# Patient Record
Sex: MALE | Race: WHITE | Employment: FULL TIME | URBAN - METROPOLITAN AREA
[De-identification: names, ages, dates, MRNs, and addresses within clinical notes are randomized per-mention and may not be internally consistent; named-entity substitution may affect disease eponyms.]

---

## 2017-09-22 ENCOUNTER — CASE MANAGEMENT (OUTPATIENT)
Dept: EMERGENCY DEPT | Age: 32
End: 2017-09-22

## 2019-01-14 ENCOUNTER — OFFICE VISIT (OUTPATIENT)
Dept: FAMILY MEDICINE CLINIC | Age: 34
End: 2019-01-14
Payer: COMMERCIAL

## 2019-01-14 VITALS
DIASTOLIC BLOOD PRESSURE: 80 MMHG | HEART RATE: 64 BPM | SYSTOLIC BLOOD PRESSURE: 132 MMHG | WEIGHT: 242 LBS | BODY MASS INDEX: 36.68 KG/M2 | HEIGHT: 68 IN | OXYGEN SATURATION: 99 %

## 2019-01-14 DIAGNOSIS — L60.8 DISCOLORATION OF NAIL: ICD-10-CM

## 2019-01-14 DIAGNOSIS — Z82.49 FAMILY HISTORY OF EARLY CAD: ICD-10-CM

## 2019-01-14 DIAGNOSIS — H65.91 FLUID LEVEL BEHIND TYMPANIC MEMBRANE OF RIGHT EAR: ICD-10-CM

## 2019-01-14 DIAGNOSIS — I10 ESSENTIAL HYPERTENSION: Primary | ICD-10-CM

## 2019-01-14 DIAGNOSIS — E66.09 CLASS 2 OBESITY DUE TO EXCESS CALORIES WITHOUT SERIOUS COMORBIDITY WITH BODY MASS INDEX (BMI) OF 36.0 TO 36.9 IN ADULT: ICD-10-CM

## 2019-01-14 DIAGNOSIS — K21.9 GASTROESOPHAGEAL REFLUX DISEASE, ESOPHAGITIS PRESENCE NOT SPECIFIED: ICD-10-CM

## 2019-01-14 PROBLEM — E66.812 CLASS 2 OBESITY DUE TO EXCESS CALORIES WITHOUT SERIOUS COMORBIDITY WITH BODY MASS INDEX (BMI) OF 36.0 TO 36.9 IN ADULT: Status: ACTIVE | Noted: 2019-01-14

## 2019-01-14 PROCEDURE — 99204 OFFICE O/P NEW MOD 45 MIN: CPT | Performed by: PHYSICIAN ASSISTANT

## 2019-01-14 RX ORDER — RANITIDINE 150 MG/1
150 TABLET ORAL DAILY
Qty: 90 TABLET | Refills: 1 | Status: SHIPPED | OUTPATIENT
Start: 2019-01-14 | End: 2019-06-24 | Stop reason: SDUPTHER

## 2019-01-14 RX ORDER — PREDNISONE 10 MG/1
TABLET ORAL
Qty: 17 TABLET | Refills: 0 | Status: SHIPPED | OUTPATIENT
Start: 2019-01-14 | End: 2019-01-24

## 2019-01-14 RX ORDER — FLUTICASONE PROPIONATE 50 MCG
2 SPRAY, SUSPENSION (ML) NASAL DAILY
Qty: 3 BOTTLE | Refills: 1 | Status: SHIPPED | OUTPATIENT
Start: 2019-01-14 | End: 2019-10-13 | Stop reason: ALTCHOICE

## 2019-01-14 RX ORDER — LISINOPRIL AND HYDROCHLOROTHIAZIDE 20; 12.5 MG/1; MG/1
1 TABLET ORAL DAILY
COMMUNITY
End: 2019-01-14 | Stop reason: SDUPTHER

## 2019-01-14 RX ORDER — LISINOPRIL AND HYDROCHLOROTHIAZIDE 20; 12.5 MG/1; MG/1
1 TABLET ORAL DAILY
Qty: 90 TABLET | Refills: 0 | Status: SHIPPED | OUTPATIENT
Start: 2019-01-14 | End: 2019-04-24 | Stop reason: SDUPTHER

## 2019-01-14 ASSESSMENT — ENCOUNTER SYMPTOMS
CONSTIPATION: 0
BLOOD IN STOOL: 0
DIARRHEA: 0
WHEEZING: 0
CHEST TIGHTNESS: 0
SHORTNESS OF BREATH: 0
ABDOMINAL PAIN: 0
BACK PAIN: 0
NAUSEA: 0
COLOR CHANGE: 1
VOMITING: 0

## 2019-01-14 ASSESSMENT — PATIENT HEALTH QUESTIONNAIRE - PHQ9
1. LITTLE INTEREST OR PLEASURE IN DOING THINGS: 0
2. FEELING DOWN, DEPRESSED OR HOPELESS: 0
SUM OF ALL RESPONSES TO PHQ9 QUESTIONS 1 & 2: 0
SUM OF ALL RESPONSES TO PHQ QUESTIONS 1-9: 0
DEPRESSION UNABLE TO ASSESS: FUNCTIONAL CAPACITY MOTIVATION LIMITS ACCURACY
SUM OF ALL RESPONSES TO PHQ QUESTIONS 1-9: 0

## 2019-01-30 DIAGNOSIS — E66.09 CLASS 2 OBESITY DUE TO EXCESS CALORIES WITHOUT SERIOUS COMORBIDITY WITH BODY MASS INDEX (BMI) OF 36.0 TO 36.9 IN ADULT: ICD-10-CM

## 2019-01-30 DIAGNOSIS — Z82.49 FAMILY HISTORY OF EARLY CAD: ICD-10-CM

## 2019-01-30 DIAGNOSIS — I10 ESSENTIAL HYPERTENSION: ICD-10-CM

## 2019-01-30 LAB
A/G RATIO: 1.5 (ref 1.1–2.2)
ALBUMIN SERPL-MCNC: 4.1 G/DL (ref 3.4–5)
ALP BLD-CCNC: 56 U/L (ref 40–129)
ALT SERPL-CCNC: 35 U/L (ref 10–40)
ANION GAP SERPL CALCULATED.3IONS-SCNC: 12 MMOL/L (ref 3–16)
AST SERPL-CCNC: 14 U/L (ref 15–37)
BASOPHILS ABSOLUTE: 0.1 K/UL (ref 0–0.2)
BASOPHILS RELATIVE PERCENT: 0.7 %
BILIRUB SERPL-MCNC: 0.5 MG/DL (ref 0–1)
BUN BLDV-MCNC: 15 MG/DL (ref 7–20)
CALCIUM SERPL-MCNC: 9.5 MG/DL (ref 8.3–10.6)
CHLORIDE BLD-SCNC: 100 MMOL/L (ref 99–110)
CHOLESTEROL, TOTAL: 181 MG/DL (ref 0–199)
CO2: 26 MMOL/L (ref 21–32)
CREAT SERPL-MCNC: 0.8 MG/DL (ref 0.9–1.3)
EOSINOPHILS ABSOLUTE: 0.1 K/UL (ref 0–0.6)
EOSINOPHILS RELATIVE PERCENT: 1.4 %
GFR AFRICAN AMERICAN: >60
GFR NON-AFRICAN AMERICAN: >60
GLOBULIN: 2.8 G/DL
GLUCOSE BLD-MCNC: 95 MG/DL (ref 70–99)
HCT VFR BLD CALC: 45.6 % (ref 40.5–52.5)
HDLC SERPL-MCNC: 36 MG/DL (ref 40–60)
HEMOGLOBIN: 15.3 G/DL (ref 13.5–17.5)
LDL CHOLESTEROL CALCULATED: 118 MG/DL
LYMPHOCYTES ABSOLUTE: 2.5 K/UL (ref 1–5.1)
LYMPHOCYTES RELATIVE PERCENT: 35.6 %
MCH RBC QN AUTO: 31.9 PG (ref 26–34)
MCHC RBC AUTO-ENTMCNC: 33.6 G/DL (ref 31–36)
MCV RBC AUTO: 95 FL (ref 80–100)
MONOCYTES ABSOLUTE: 0.7 K/UL (ref 0–1.3)
MONOCYTES RELATIVE PERCENT: 9.6 %
NEUTROPHILS ABSOLUTE: 3.7 K/UL (ref 1.7–7.7)
NEUTROPHILS RELATIVE PERCENT: 52.7 %
PDW BLD-RTO: 13.8 % (ref 12.4–15.4)
PLATELET # BLD: 245 K/UL (ref 135–450)
PMV BLD AUTO: 9.4 FL (ref 5–10.5)
POTASSIUM SERPL-SCNC: 4.8 MMOL/L (ref 3.5–5.1)
RBC # BLD: 4.8 M/UL (ref 4.2–5.9)
SODIUM BLD-SCNC: 138 MMOL/L (ref 136–145)
TOTAL PROTEIN: 6.9 G/DL (ref 6.4–8.2)
TRIGL SERPL-MCNC: 135 MG/DL (ref 0–150)
TSH REFLEX: 1.46 UIU/ML (ref 0.27–4.2)
VLDLC SERPL CALC-MCNC: 27 MG/DL
WBC # BLD: 7 K/UL (ref 4–11)

## 2019-04-24 DIAGNOSIS — I10 ESSENTIAL HYPERTENSION: ICD-10-CM

## 2019-04-24 RX ORDER — LISINOPRIL AND HYDROCHLOROTHIAZIDE 20; 12.5 MG/1; MG/1
1 TABLET ORAL DAILY
Qty: 90 TABLET | Refills: 0 | Status: SHIPPED | OUTPATIENT
Start: 2019-04-24 | End: 2019-07-26 | Stop reason: SDUPTHER

## 2019-04-24 NOTE — TELEPHONE ENCOUNTER
Last office visit 01/14/2019     Last written 01/14/2019, 90 days with  0 refills. Next office visit scheduled: None scheduled.      Requested Prescriptions     Pending Prescriptions Disp Refills    lisinopril-hydrochlorothiazide (PRINZIDE;ZESTORETIC) 20-12.5 MG per tablet 90 tablet 0     Sig: Take 1 tablet by mouth daily

## 2019-06-04 ENCOUNTER — OFFICE VISIT (OUTPATIENT)
Dept: FAMILY MEDICINE CLINIC | Age: 34
End: 2019-06-04
Payer: COMMERCIAL

## 2019-06-04 VITALS
OXYGEN SATURATION: 99 % | BODY MASS INDEX: 36.15 KG/M2 | SYSTOLIC BLOOD PRESSURE: 132 MMHG | HEIGHT: 68 IN | HEART RATE: 76 BPM | DIASTOLIC BLOOD PRESSURE: 70 MMHG | WEIGHT: 238.5 LBS

## 2019-06-04 DIAGNOSIS — M25.551 RIGHT HIP PAIN: Primary | ICD-10-CM

## 2019-06-04 PROCEDURE — 99213 OFFICE O/P EST LOW 20 MIN: CPT | Performed by: PHYSICIAN ASSISTANT

## 2019-06-04 RX ORDER — IBUPROFEN 800 MG/1
800 TABLET ORAL 3 TIMES DAILY PRN
Qty: 90 TABLET | Refills: 1 | Status: SHIPPED | OUTPATIENT
Start: 2019-06-04 | End: 2019-08-06 | Stop reason: SDUPTHER

## 2019-06-04 ASSESSMENT — ENCOUNTER SYMPTOMS
SHORTNESS OF BREATH: 0
COLOR CHANGE: 0

## 2019-06-04 NOTE — PROGRESS NOTES
Merlin Alexander 29 y.o. male    Chief Complaint   Patient presents with    Hip Pain     right       HPI  Right Hip pain: Chronic pain getting worse in the last month. Location: anterior right groin. Pain wakes him up at night. Occasionally feels like his hip is catching. Denies any weakness or numbness in the leg. Denies any swelling in the leg. 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 Taking 400 mg BID. Current Outpatient Medications:     ibuprofen (ADVIL;MOTRIN) 800 MG tablet, Take 1 tablet by mouth 3 times daily as needed for Pain, Disp: 90 tablet, Rfl: 1    lisinopril-hydrochlorothiazide (PRINZIDE;ZESTORETIC) 20-12.5 MG per tablet, Take 1 tablet by mouth daily, Disp: 90 tablet, Rfl: 0    ranitidine (ZANTAC) 150 MG tablet, Take 1 tablet by mouth daily, Disp: 90 tablet, Rfl: 1    fluticasone (FLONASE) 50 MCG/ACT nasal spray, 2 sprays by Each Nare route daily, Disp: 3 Bottle, Rfl: 1    ciclopirox (PENLAC) 8 % solution, Apply topically to fingernail nightly., Disp: 1 Bottle, Rfl: 0      Vitals:    06/04/19 1807   BP: 132/70   Pulse: 76   SpO2: 99%       Review of Systems   Constitutional: Negative for fever. Respiratory: Negative for shortness of breath. Cardiovascular: Negative for chest pain and leg swelling. Musculoskeletal: Positive for arthralgias and myalgias. Negative for gait problem, joint swelling, neck pain and neck stiffness. Skin: Negative for color change. Neurological: Negative for weakness and numbness. Hematological: Negative for adenopathy. Does not bruise/bleed easily. Physical Exam   Constitutional: He appears well-developed and well-nourished. Cardiovascular: Normal rate, regular rhythm and normal heart sounds. Pulmonary/Chest: Effort normal and breath sounds normal.   Musculoskeletal:        Right hip: He exhibits decreased range of motion and crepitus. He exhibits normal strength, no tenderness, no bony tenderness and no swelling.         Right knee: He

## 2019-06-24 DIAGNOSIS — K21.9 GASTROESOPHAGEAL REFLUX DISEASE, ESOPHAGITIS PRESENCE NOT SPECIFIED: ICD-10-CM

## 2019-06-24 RX ORDER — RANITIDINE 150 MG/1
150 TABLET ORAL DAILY
Qty: 90 TABLET | Refills: 1 | Status: SHIPPED | OUTPATIENT
Start: 2019-06-24 | End: 2019-10-30 | Stop reason: SDUPTHER

## 2019-06-24 NOTE — TELEPHONE ENCOUNTER
.  Last office visit 6/4/2019     Last written 1-14-19 90 with 1      Next office visit scheduled no future ov    Requested Prescriptions     Pending Prescriptions Disp Refills    ranitidine (ZANTAC) 150 MG tablet 90 tablet 1     Sig: Take 1 tablet by mouth daily

## 2019-07-05 ENCOUNTER — TELEPHONE (OUTPATIENT)
Dept: FAMILY MEDICINE CLINIC | Age: 34
End: 2019-07-05

## 2019-07-05 NOTE — TELEPHONE ENCOUNTER
Suggest to pt that he be seen in an Urgent care over the weekend. If this is another cellulitis infection we need to catch it early.  Thank you

## 2019-07-26 DIAGNOSIS — I10 ESSENTIAL HYPERTENSION: ICD-10-CM

## 2019-07-26 RX ORDER — LISINOPRIL AND HYDROCHLOROTHIAZIDE 20; 12.5 MG/1; MG/1
1 TABLET ORAL DAILY
Qty: 90 TABLET | Refills: 0 | Status: ON HOLD | OUTPATIENT
Start: 2019-07-26 | End: 2019-10-15 | Stop reason: HOSPADM

## 2019-08-06 DIAGNOSIS — M25.551 RIGHT HIP PAIN: ICD-10-CM

## 2019-08-06 RX ORDER — IBUPROFEN 800 MG/1
800 TABLET ORAL 2 TIMES DAILY PRN
Qty: 90 TABLET | Refills: 0 | Status: SHIPPED | OUTPATIENT
Start: 2019-08-06 | End: 2019-10-13 | Stop reason: ALTCHOICE

## 2019-08-06 NOTE — TELEPHONE ENCOUNTER
Last Seen: 6/4/2019    Last Writen: 6-4-2019 x 1 refill    Next Appointment: Not scheduled    Requested Prescriptions     Pending Prescriptions Disp Refills    ibuprofen (ADVIL;MOTRIN) 800 MG tablet 90 tablet 1     Sig: Take 1 tablet by mouth 3 times daily as needed for Pain

## 2019-10-13 ENCOUNTER — HOSPITAL ENCOUNTER (INPATIENT)
Age: 34
LOS: 2 days | Discharge: HOME | DRG: 603 | End: 2019-10-15
Attending: EMERGENCY MEDICINE | Admitting: INTERNAL MEDICINE
Payer: COMMERCIAL

## 2019-10-13 ENCOUNTER — APPOINTMENT (OUTPATIENT)
Dept: CT IMAGING | Age: 34
DRG: 603 | End: 2019-10-13
Payer: COMMERCIAL

## 2019-10-13 ENCOUNTER — APPOINTMENT (OUTPATIENT)
Dept: GENERAL RADIOLOGY | Age: 34
DRG: 603 | End: 2019-10-13
Payer: COMMERCIAL

## 2019-10-13 DIAGNOSIS — L03.116 BILATERAL LOWER LEG CELLULITIS: Primary | ICD-10-CM

## 2019-10-13 DIAGNOSIS — L03.115 BILATERAL LOWER LEG CELLULITIS: Primary | ICD-10-CM

## 2019-10-13 LAB
A/G RATIO: 1.1 (ref 1.1–2.2)
ALBUMIN SERPL-MCNC: 3.9 G/DL (ref 3.4–5)
ALP BLD-CCNC: 48 U/L (ref 40–129)
ALT SERPL-CCNC: 18 U/L (ref 10–40)
ANION GAP SERPL CALCULATED.3IONS-SCNC: 12 MMOL/L (ref 3–16)
AST SERPL-CCNC: 11 U/L (ref 15–37)
BASOPHILS ABSOLUTE: 0 K/UL (ref 0–0.2)
BASOPHILS RELATIVE PERCENT: 0.3 %
BILIRUB SERPL-MCNC: 0.6 MG/DL (ref 0–1)
BUN BLDV-MCNC: 11 MG/DL (ref 7–20)
CALCIUM SERPL-MCNC: 9.4 MG/DL (ref 8.3–10.6)
CHLORIDE BLD-SCNC: 96 MMOL/L (ref 99–110)
CO2: 25 MMOL/L (ref 21–32)
CREAT SERPL-MCNC: 0.9 MG/DL (ref 0.9–1.3)
EOSINOPHILS ABSOLUTE: 0 K/UL (ref 0–0.6)
EOSINOPHILS RELATIVE PERCENT: 0.1 %
GFR AFRICAN AMERICAN: >60
GFR NON-AFRICAN AMERICAN: >60
GLOBULIN: 3.4 G/DL
GLUCOSE BLD-MCNC: 107 MG/DL (ref 70–99)
HCT VFR BLD CALC: 41.5 % (ref 40.5–52.5)
HEMOGLOBIN: 14.2 G/DL (ref 13.5–17.5)
LACTIC ACID: 1.4 MMOL/L (ref 0.4–2)
LYMPHOCYTES ABSOLUTE: 0.8 K/UL (ref 1–5.1)
LYMPHOCYTES RELATIVE PERCENT: 5.6 %
MCH RBC QN AUTO: 31.8 PG (ref 26–34)
MCHC RBC AUTO-ENTMCNC: 34.3 G/DL (ref 31–36)
MCV RBC AUTO: 92.9 FL (ref 80–100)
MONOCYTES ABSOLUTE: 1.3 K/UL (ref 0–1.3)
MONOCYTES RELATIVE PERCENT: 9 %
NEUTROPHILS ABSOLUTE: 11.9 K/UL (ref 1.7–7.7)
NEUTROPHILS RELATIVE PERCENT: 85 %
PDW BLD-RTO: 12.8 % (ref 12.4–15.4)
PLATELET # BLD: 194 K/UL (ref 135–450)
PMV BLD AUTO: 8.6 FL (ref 5–10.5)
POTASSIUM REFLEX MAGNESIUM: 3.9 MMOL/L (ref 3.5–5.1)
RBC # BLD: 4.47 M/UL (ref 4.2–5.9)
SEDIMENTATION RATE, ERYTHROCYTE: 49 MM/HR (ref 0–15)
SODIUM BLD-SCNC: 133 MMOL/L (ref 136–145)
TOTAL PROTEIN: 7.3 G/DL (ref 6.4–8.2)
WBC # BLD: 14 K/UL (ref 4–11)

## 2019-10-13 PROCEDURE — 85652 RBC SED RATE AUTOMATED: CPT

## 2019-10-13 PROCEDURE — 75635 CT ANGIO ABDOMINAL ARTERIES: CPT

## 2019-10-13 PROCEDURE — 85025 COMPLETE CBC W/AUTO DIFF WBC: CPT

## 2019-10-13 PROCEDURE — 87040 BLOOD CULTURE FOR BACTERIA: CPT

## 2019-10-13 PROCEDURE — 2580000003 HC RX 258: Performed by: INTERNAL MEDICINE

## 2019-10-13 PROCEDURE — 2500000003 HC RX 250 WO HCPCS

## 2019-10-13 PROCEDURE — 2580000003 HC RX 258

## 2019-10-13 PROCEDURE — 86140 C-REACTIVE PROTEIN: CPT

## 2019-10-13 PROCEDURE — 6360000002 HC RX W HCPCS: Performed by: PHYSICIAN ASSISTANT

## 2019-10-13 PROCEDURE — 96361 HYDRATE IV INFUSION ADD-ON: CPT

## 2019-10-13 PROCEDURE — 6360000002 HC RX W HCPCS

## 2019-10-13 PROCEDURE — 6370000000 HC RX 637 (ALT 250 FOR IP): Performed by: INTERNAL MEDICINE

## 2019-10-13 PROCEDURE — 1200000000 HC SEMI PRIVATE

## 2019-10-13 PROCEDURE — 36415 COLL VENOUS BLD VENIPUNCTURE: CPT

## 2019-10-13 PROCEDURE — 2580000003 HC RX 258: Performed by: PHYSICIAN ASSISTANT

## 2019-10-13 PROCEDURE — 6360000004 HC RX CONTRAST MEDICATION: Performed by: PHYSICIAN ASSISTANT

## 2019-10-13 PROCEDURE — 99284 EMERGENCY DEPT VISIT MOD MDM: CPT

## 2019-10-13 PROCEDURE — 6370000000 HC RX 637 (ALT 250 FOR IP): Performed by: PHYSICIAN ASSISTANT

## 2019-10-13 PROCEDURE — 96360 HYDRATION IV INFUSION INIT: CPT

## 2019-10-13 PROCEDURE — 83605 ASSAY OF LACTIC ACID: CPT

## 2019-10-13 PROCEDURE — 80053 COMPREHEN METABOLIC PANEL: CPT

## 2019-10-13 PROCEDURE — 80074 ACUTE HEPATITIS PANEL: CPT

## 2019-10-13 RX ORDER — SODIUM CHLORIDE 0.9 % (FLUSH) 0.9 %
10 SYRINGE (ML) INJECTION PRN
Status: DISCONTINUED | OUTPATIENT
Start: 2019-10-13 | End: 2019-10-15 | Stop reason: HOSPADM

## 2019-10-13 RX ORDER — IBUPROFEN 800 MG/1
800 TABLET ORAL ONCE
Status: COMPLETED | OUTPATIENT
Start: 2019-10-13 | End: 2019-10-13

## 2019-10-13 RX ORDER — SODIUM CHLORIDE 9 MG/ML
INJECTION, SOLUTION INTRAVENOUS
Status: COMPLETED
Start: 2019-10-13 | End: 2019-10-14

## 2019-10-13 RX ORDER — MAGNESIUM SULFATE 1 G/100ML
1 INJECTION INTRAVENOUS PRN
Status: DISCONTINUED | OUTPATIENT
Start: 2019-10-13 | End: 2019-10-15 | Stop reason: HOSPADM

## 2019-10-13 RX ORDER — HYDROCODONE BITARTRATE AND ACETAMINOPHEN 5; 325 MG/1; MG/1
1 TABLET ORAL ONCE
Status: COMPLETED | OUTPATIENT
Start: 2019-10-13 | End: 2019-10-13

## 2019-10-13 RX ORDER — IBUPROFEN 400 MG/1
400 TABLET ORAL ONCE
Status: COMPLETED | OUTPATIENT
Start: 2019-10-13 | End: 2019-10-13

## 2019-10-13 RX ORDER — CLINDAMYCIN HYDROCHLORIDE 150 MG/1
300 CAPSULE ORAL ONCE
Status: COMPLETED | OUTPATIENT
Start: 2019-10-13 | End: 2019-10-13

## 2019-10-13 RX ORDER — DIPHENHYDRAMINE HYDROCHLORIDE 50 MG/ML
50 INJECTION INTRAMUSCULAR; INTRAVENOUS ONCE
Status: COMPLETED | OUTPATIENT
Start: 2019-10-13 | End: 2019-10-13

## 2019-10-13 RX ORDER — ACETAMINOPHEN 500 MG
1000 TABLET ORAL ONCE
Status: COMPLETED | OUTPATIENT
Start: 2019-10-13 | End: 2019-10-13

## 2019-10-13 RX ORDER — 0.9 % SODIUM CHLORIDE 0.9 %
1000 INTRAVENOUS SOLUTION INTRAVENOUS ONCE
Status: COMPLETED | OUTPATIENT
Start: 2019-10-13 | End: 2019-10-13

## 2019-10-13 RX ORDER — DIPHENHYDRAMINE HYDROCHLORIDE 50 MG/ML
INJECTION INTRAMUSCULAR; INTRAVENOUS
Status: COMPLETED
Start: 2019-10-13 | End: 2019-10-13

## 2019-10-13 RX ORDER — SODIUM CHLORIDE 0.9 % (FLUSH) 0.9 %
10 SYRINGE (ML) INJECTION EVERY 12 HOURS SCHEDULED
Status: DISCONTINUED | OUTPATIENT
Start: 2019-10-13 | End: 2019-10-15 | Stop reason: HOSPADM

## 2019-10-13 RX ORDER — POTASSIUM CHLORIDE 7.45 MG/ML
10 INJECTION INTRAVENOUS PRN
Status: DISCONTINUED | OUTPATIENT
Start: 2019-10-13 | End: 2019-10-15 | Stop reason: HOSPADM

## 2019-10-13 RX ORDER — ONDANSETRON 2 MG/ML
4 INJECTION INTRAMUSCULAR; INTRAVENOUS EVERY 6 HOURS PRN
Status: DISCONTINUED | OUTPATIENT
Start: 2019-10-13 | End: 2019-10-15 | Stop reason: HOSPADM

## 2019-10-13 RX ADMIN — IOPAMIDOL 100 ML: 755 INJECTION, SOLUTION INTRAVENOUS at 17:20

## 2019-10-13 RX ADMIN — HYDROCODONE BITARTRATE AND ACETAMINOPHEN 1 TABLET: 5; 325 TABLET ORAL at 17:43

## 2019-10-13 RX ADMIN — VANCOMYCIN HYDROCHLORIDE 1.5 G: 10 INJECTION, POWDER, LYOPHILIZED, FOR SOLUTION INTRAVENOUS at 21:57

## 2019-10-13 RX ADMIN — CEFEPIME 2 G: 2 INJECTION, POWDER, FOR SOLUTION INTRAVENOUS at 19:25

## 2019-10-13 RX ADMIN — DIPHENHYDRAMINE HYDROCHLORIDE 50 MG: 50 INJECTION, SOLUTION INTRAMUSCULAR; INTRAVENOUS at 19:46

## 2019-10-13 RX ADMIN — Medication 10 ML: at 21:59

## 2019-10-13 RX ADMIN — IBUPROFEN 400 MG: 400 TABLET, FILM COATED ORAL at 21:57

## 2019-10-13 RX ADMIN — ACETAMINOPHEN 1000 MG: 500 TABLET ORAL at 21:56

## 2019-10-13 RX ADMIN — SODIUM CHLORIDE 1000 ML: 9 INJECTION, SOLUTION INTRAVENOUS at 15:56

## 2019-10-13 RX ADMIN — IBUPROFEN 800 MG: 800 TABLET, FILM COATED ORAL at 15:56

## 2019-10-13 RX ADMIN — CLINDAMYCIN HYDROCHLORIDE 300 MG: 150 CAPSULE ORAL at 15:56

## 2019-10-13 RX ADMIN — DIPHENHYDRAMINE HYDROCHLORIDE 50 MG: 50 INJECTION INTRAMUSCULAR; INTRAVENOUS at 19:46

## 2019-10-13 RX ADMIN — SODIUM CHLORIDE 250 ML: 9 INJECTION, SOLUTION INTRAVENOUS at 21:57

## 2019-10-13 RX ADMIN — FAMOTIDINE 40 MG: 10 INJECTION, SOLUTION INTRAVENOUS at 19:46

## 2019-10-13 ASSESSMENT — PAIN DESCRIPTION - PAIN TYPE: TYPE: ACUTE PAIN

## 2019-10-13 ASSESSMENT — PAIN DESCRIPTION - FREQUENCY: FREQUENCY: INTERMITTENT

## 2019-10-13 ASSESSMENT — PAIN SCALES - GENERAL
PAINLEVEL_OUTOF10: 7
PAINLEVEL_OUTOF10: 10
PAINLEVEL_OUTOF10: 8

## 2019-10-13 ASSESSMENT — PAIN DESCRIPTION - DESCRIPTORS: DESCRIPTORS: BURNING

## 2019-10-13 ASSESSMENT — PAIN DESCRIPTION - PROGRESSION: CLINICAL_PROGRESSION: NOT CHANGED

## 2019-10-13 ASSESSMENT — PAIN DESCRIPTION - ORIENTATION
ORIENTATION: RIGHT;LEFT
ORIENTATION: RIGHT;LEFT

## 2019-10-13 ASSESSMENT — PAIN DESCRIPTION - LOCATION
LOCATION: LEG
LOCATION: GROIN

## 2019-10-13 ASSESSMENT — PAIN DESCRIPTION - ONSET: ONSET: ON-GOING

## 2019-10-13 ASSESSMENT — PAIN - FUNCTIONAL ASSESSMENT: PAIN_FUNCTIONAL_ASSESSMENT: ACTIVITIES ARE NOT PREVENTED

## 2019-10-14 LAB
C-REACTIVE PROTEIN: 81.9 MG/L (ref 0–5.1)
HAV IGM SER IA-ACNC: NORMAL
HEPATITIS B CORE IGM ANTIBODY: NORMAL
HEPATITIS B SURFACE ANTIGEN INTERPRETATION: NORMAL
HEPATITIS C ANTIBODY INTERPRETATION: NORMAL

## 2019-10-14 PROCEDURE — 99232 SBSQ HOSP IP/OBS MODERATE 35: CPT | Performed by: INTERNAL MEDICINE

## 2019-10-14 PROCEDURE — 6360000002 HC RX W HCPCS: Performed by: PHYSICIAN ASSISTANT

## 2019-10-14 PROCEDURE — 1200000000 HC SEMI PRIVATE

## 2019-10-14 PROCEDURE — 2580000003 HC RX 258: Performed by: INTERNAL MEDICINE

## 2019-10-14 PROCEDURE — 6360000002 HC RX W HCPCS: Performed by: INTERNAL MEDICINE

## 2019-10-14 PROCEDURE — 6370000000 HC RX 637 (ALT 250 FOR IP): Performed by: PHYSICIAN ASSISTANT

## 2019-10-14 PROCEDURE — 2580000003 HC RX 258: Performed by: PHYSICIAN ASSISTANT

## 2019-10-14 PROCEDURE — 6370000000 HC RX 637 (ALT 250 FOR IP): Performed by: INTERNAL MEDICINE

## 2019-10-14 RX ORDER — DIPHENHYDRAMINE HCL 25 MG
25 TABLET ORAL NIGHTLY PRN
Status: DISCONTINUED | OUTPATIENT
Start: 2019-10-14 | End: 2019-10-15 | Stop reason: HOSPADM

## 2019-10-14 RX ORDER — FAMOTIDINE 20 MG/1
20 TABLET, FILM COATED ORAL 2 TIMES DAILY
Status: DISCONTINUED | OUTPATIENT
Start: 2019-10-14 | End: 2019-10-15 | Stop reason: HOSPADM

## 2019-10-14 RX ORDER — HYDROCODONE BITARTRATE AND ACETAMINOPHEN 5; 325 MG/1; MG/1
1 TABLET ORAL EVERY 6 HOURS PRN
Status: DISCONTINUED | OUTPATIENT
Start: 2019-10-14 | End: 2019-10-15 | Stop reason: HOSPADM

## 2019-10-14 RX ADMIN — VANCOMYCIN HYDROCHLORIDE 1250 MG: 10 INJECTION, POWDER, LYOPHILIZED, FOR SOLUTION INTRAVENOUS at 10:12

## 2019-10-14 RX ADMIN — AMPICILLIN AND SULBACTAM 1.5 G: 1; .5 INJECTION, POWDER, FOR SOLUTION INTRAMUSCULAR; INTRAVENOUS at 11:52

## 2019-10-14 RX ADMIN — AMPICILLIN AND SULBACTAM 1.5 G: 1; .5 INJECTION, POWDER, FOR SOLUTION INTRAMUSCULAR; INTRAVENOUS at 21:39

## 2019-10-14 RX ADMIN — HYDROCODONE BITARTRATE AND ACETAMINOPHEN 1 TABLET: 5; 325 TABLET ORAL at 11:28

## 2019-10-14 RX ADMIN — DIPHENHYDRAMINE HCL 25 MG: 25 TABLET ORAL at 21:01

## 2019-10-14 RX ADMIN — VANCOMYCIN HYDROCHLORIDE 1250 MG: 10 INJECTION, POWDER, LYOPHILIZED, FOR SOLUTION INTRAVENOUS at 22:43

## 2019-10-14 RX ADMIN — FAMOTIDINE 20 MG: 20 TABLET ORAL at 19:01

## 2019-10-14 RX ADMIN — AMPICILLIN AND SULBACTAM 1.5 G: 1; .5 INJECTION, POWDER, FOR SOLUTION INTRAMUSCULAR; INTRAVENOUS at 17:06

## 2019-10-14 RX ADMIN — ENOXAPARIN SODIUM 40 MG: 40 INJECTION SUBCUTANEOUS at 08:03

## 2019-10-14 RX ADMIN — HYDROCODONE BITARTRATE AND ACETAMINOPHEN 1 TABLET: 5; 325 TABLET ORAL at 17:06

## 2019-10-14 RX ADMIN — Medication 10 ML: at 08:04

## 2019-10-14 RX ADMIN — Medication 10 ML: at 21:02

## 2019-10-14 ASSESSMENT — PAIN SCALES - GENERAL
PAINLEVEL_OUTOF10: 6
PAINLEVEL_OUTOF10: 8
PAINLEVEL_OUTOF10: 5
PAINLEVEL_OUTOF10: 4

## 2019-10-15 VITALS
OXYGEN SATURATION: 96 % | BODY MASS INDEX: 37.06 KG/M2 | WEIGHT: 236.1 LBS | HEIGHT: 67 IN | HEART RATE: 81 BPM | TEMPERATURE: 97.8 F | SYSTOLIC BLOOD PRESSURE: 127 MMHG | DIASTOLIC BLOOD PRESSURE: 78 MMHG | RESPIRATION RATE: 16 BRPM

## 2019-10-15 LAB — VANCOMYCIN TROUGH: 6.9 UG/ML (ref 10–20)

## 2019-10-15 PROCEDURE — 6370000000 HC RX 637 (ALT 250 FOR IP): Performed by: INTERNAL MEDICINE

## 2019-10-15 PROCEDURE — 80202 ASSAY OF VANCOMYCIN: CPT

## 2019-10-15 PROCEDURE — 36415 COLL VENOUS BLD VENIPUNCTURE: CPT

## 2019-10-15 PROCEDURE — 99238 HOSP IP/OBS DSCHRG MGMT 30/<: CPT | Performed by: INTERNAL MEDICINE

## 2019-10-15 PROCEDURE — 6360000002 HC RX W HCPCS: Performed by: INTERNAL MEDICINE

## 2019-10-15 PROCEDURE — 2580000003 HC RX 258: Performed by: INTERNAL MEDICINE

## 2019-10-15 PROCEDURE — 6370000000 HC RX 637 (ALT 250 FOR IP): Performed by: PHYSICIAN ASSISTANT

## 2019-10-15 PROCEDURE — 6360000002 HC RX W HCPCS: Performed by: PHYSICIAN ASSISTANT

## 2019-10-15 PROCEDURE — 2580000003 HC RX 258: Performed by: PHYSICIAN ASSISTANT

## 2019-10-15 RX ORDER — IBUPROFEN 800 MG/1
800 TABLET ORAL EVERY 8 HOURS PRN
Qty: 30 TABLET | Refills: 0 | Status: SHIPPED | OUTPATIENT
Start: 2019-10-15 | End: 2020-03-19

## 2019-10-15 RX ORDER — AMOXICILLIN AND CLAVULANATE POTASSIUM 500; 125 MG/1; MG/1
1 TABLET, FILM COATED ORAL 3 TIMES DAILY
Qty: 21 TABLET | Refills: 0 | Status: SHIPPED | OUTPATIENT
Start: 2019-10-15 | End: 2019-10-22

## 2019-10-15 RX ORDER — HYDROCHLOROTHIAZIDE 12.5 MG/1
12.5 TABLET ORAL DAILY
Qty: 30 TABLET | Refills: 0 | Status: SHIPPED | OUTPATIENT
Start: 2019-10-15 | End: 2019-10-18 | Stop reason: SDUPTHER

## 2019-10-15 RX ADMIN — ENOXAPARIN SODIUM 40 MG: 40 INJECTION SUBCUTANEOUS at 08:24

## 2019-10-15 RX ADMIN — AMPICILLIN AND SULBACTAM 1.5 G: 1; .5 INJECTION, POWDER, FOR SOLUTION INTRAMUSCULAR; INTRAVENOUS at 04:09

## 2019-10-15 RX ADMIN — HYDROCODONE BITARTRATE AND ACETAMINOPHEN 1 TABLET: 5; 325 TABLET ORAL at 08:24

## 2019-10-15 RX ADMIN — Medication 10 ML: at 08:25

## 2019-10-15 RX ADMIN — AMPICILLIN SODIUM AND SULBACTAM SODIUM 3 G: 2; 1 INJECTION, POWDER, FOR SOLUTION INTRAMUSCULAR; INTRAVENOUS at 10:28

## 2019-10-15 RX ADMIN — FAMOTIDINE 20 MG: 20 TABLET ORAL at 08:24

## 2019-10-15 RX ADMIN — HYDROCODONE BITARTRATE AND ACETAMINOPHEN 1 TABLET: 5; 325 TABLET ORAL at 00:19

## 2019-10-15 ASSESSMENT — PAIN DESCRIPTION - ONSET
ONSET: GRADUAL
ONSET: GRADUAL

## 2019-10-15 ASSESSMENT — PAIN SCALES - GENERAL
PAINLEVEL_OUTOF10: 7
PAINLEVEL_OUTOF10: 6
PAINLEVEL_OUTOF10: 0

## 2019-10-15 ASSESSMENT — PAIN DESCRIPTION - ORIENTATION
ORIENTATION: LEFT
ORIENTATION: LEFT

## 2019-10-15 ASSESSMENT — PAIN DESCRIPTION - LOCATION
LOCATION: LEG
LOCATION: LEG

## 2019-10-15 ASSESSMENT — PAIN - FUNCTIONAL ASSESSMENT
PAIN_FUNCTIONAL_ASSESSMENT: ACTIVITIES ARE NOT PREVENTED
PAIN_FUNCTIONAL_ASSESSMENT: ACTIVITIES ARE NOT PREVENTED

## 2019-10-15 ASSESSMENT — PAIN DESCRIPTION - FREQUENCY
FREQUENCY: INTERMITTENT
FREQUENCY: INTERMITTENT

## 2019-10-15 ASSESSMENT — PAIN DESCRIPTION - PROGRESSION: CLINICAL_PROGRESSION: GRADUALLY WORSENING

## 2019-10-15 ASSESSMENT — PAIN DESCRIPTION - DESCRIPTORS
DESCRIPTORS: SORE;PRESSURE
DESCRIPTORS: PRESSURE;SORE

## 2019-10-15 ASSESSMENT — PAIN DESCRIPTION - PAIN TYPE
TYPE: ACUTE PAIN
TYPE: ACUTE PAIN

## 2019-10-16 ENCOUNTER — TELEPHONE (OUTPATIENT)
Dept: FAMILY MEDICINE CLINIC | Age: 34
End: 2019-10-16

## 2019-10-17 ENCOUNTER — TELEPHONE (OUTPATIENT)
Dept: FAMILY MEDICINE CLINIC | Age: 34
End: 2019-10-17

## 2019-10-18 ENCOUNTER — OFFICE VISIT (OUTPATIENT)
Dept: FAMILY MEDICINE CLINIC | Age: 34
End: 2019-10-18
Payer: COMMERCIAL

## 2019-10-18 VITALS
OXYGEN SATURATION: 99 % | BODY MASS INDEX: 37.75 KG/M2 | WEIGHT: 241 LBS | TEMPERATURE: 98.3 F | SYSTOLIC BLOOD PRESSURE: 110 MMHG | DIASTOLIC BLOOD PRESSURE: 80 MMHG | HEART RATE: 59 BPM

## 2019-10-18 DIAGNOSIS — R60.9 DEPENDENT EDEMA: ICD-10-CM

## 2019-10-18 DIAGNOSIS — R70.0 ELEVATED ERYTHROCYTE SEDIMENTATION RATE: ICD-10-CM

## 2019-10-18 DIAGNOSIS — L03.115 BILATERAL LOWER LEG CELLULITIS: Primary | ICD-10-CM

## 2019-10-18 DIAGNOSIS — D72.829 LEUKOCYTOSIS, UNSPECIFIED TYPE: ICD-10-CM

## 2019-10-18 DIAGNOSIS — R79.82 ELEVATED C-REACTIVE PROTEIN (CRP): ICD-10-CM

## 2019-10-18 DIAGNOSIS — L03.116 BILATERAL LOWER LEG CELLULITIS: Primary | ICD-10-CM

## 2019-10-18 LAB
BLOOD CULTURE, ROUTINE: NORMAL
CULTURE, BLOOD 2: NORMAL

## 2019-10-18 PROCEDURE — 1111F DSCHRG MED/CURRENT MED MERGE: CPT | Performed by: PHYSICIAN ASSISTANT

## 2019-10-18 PROCEDURE — 99495 TRANSJ CARE MGMT MOD F2F 14D: CPT | Performed by: PHYSICIAN ASSISTANT

## 2019-10-18 RX ORDER — HYDROCHLOROTHIAZIDE 25 MG/1
25 TABLET ORAL DAILY
Qty: 90 TABLET | Refills: 1 | Status: SHIPPED | OUTPATIENT
Start: 2019-10-18 | End: 2020-03-30 | Stop reason: SDUPTHER

## 2019-10-22 ENCOUNTER — TELEPHONE (OUTPATIENT)
Dept: FAMILY MEDICINE CLINIC | Age: 34
End: 2019-10-22

## 2019-10-30 DIAGNOSIS — K21.9 GASTROESOPHAGEAL REFLUX DISEASE, ESOPHAGITIS PRESENCE NOT SPECIFIED: ICD-10-CM

## 2019-10-30 RX ORDER — RANITIDINE 150 MG/1
150 TABLET ORAL DAILY
Qty: 90 TABLET | Refills: 1 | Status: SHIPPED | OUTPATIENT
Start: 2019-10-30 | End: 2020-03-19 | Stop reason: CLARIF

## 2019-12-17 ENCOUNTER — TELEPHONE (OUTPATIENT)
Dept: FAMILY MEDICINE CLINIC | Age: 34
End: 2019-12-17

## 2019-12-17 RX ORDER — SULFAMETHOXAZOLE AND TRIMETHOPRIM 800; 160 MG/1; MG/1
1 TABLET ORAL 2 TIMES DAILY
Qty: 14 TABLET | Refills: 0 | Status: SHIPPED | OUTPATIENT
Start: 2019-12-17 | End: 2019-12-24

## 2020-03-04 ENCOUNTER — OFFICE VISIT (OUTPATIENT)
Dept: FAMILY MEDICINE CLINIC | Age: 35
End: 2020-03-04
Payer: COMMERCIAL

## 2020-03-04 VITALS
TEMPERATURE: 99 F | WEIGHT: 219 LBS | BODY MASS INDEX: 34.3 KG/M2 | OXYGEN SATURATION: 98 % | SYSTOLIC BLOOD PRESSURE: 120 MMHG | HEART RATE: 87 BPM | DIASTOLIC BLOOD PRESSURE: 80 MMHG

## 2020-03-04 DIAGNOSIS — L81.9 DISCOLORATION OF SKIN OF LOWER LEG: ICD-10-CM

## 2020-03-04 DIAGNOSIS — M79.662 PAIN OF LEFT LOWER LEG: ICD-10-CM

## 2020-03-04 PROCEDURE — 99214 OFFICE O/P EST MOD 30 MIN: CPT | Performed by: PHYSICIAN ASSISTANT

## 2020-03-04 RX ORDER — TIZANIDINE 4 MG/1
4 TABLET ORAL EVERY 8 HOURS PRN
Qty: 30 TABLET | Refills: 0 | Status: SHIPPED | OUTPATIENT
Start: 2020-03-04 | End: 2020-06-08

## 2020-03-04 ASSESSMENT — PATIENT HEALTH QUESTIONNAIRE - PHQ9
SUM OF ALL RESPONSES TO PHQ QUESTIONS 1-9: 0
2. FEELING DOWN, DEPRESSED OR HOPELESS: 0
1. LITTLE INTEREST OR PLEASURE IN DOING THINGS: 0
SUM OF ALL RESPONSES TO PHQ9 QUESTIONS 1 & 2: 0
SUM OF ALL RESPONSES TO PHQ QUESTIONS 1-9: 0

## 2020-03-04 NOTE — LETTER
[address] [facility] [address]  [facility] [address]  Phone: [phone]  Fax: [phone]    Bruce Sparks        March 4, 2020     Patient: Kole Dyer   YOB: 1985   Date of Visit: 3/4/2020       To Whom It May Concern: It is my medical opinion that Kole Dyer may return to work on 03/06/2020. If you have any questions or concerns, please don't hesitate to call.     Sincerely,          LYDIA Sparks

## 2020-03-04 NOTE — PROGRESS NOTES
SpO2: 98%       Review of Systems   Constitutional: Negative for chills, diaphoresis, fatigue and fever. Gastrointestinal: Negative for abdominal pain and bowel incontinence. Genitourinary: Negative for bladder incontinence, dysuria and pelvic pain. Musculoskeletal: Positive for back pain. Negative for arthralgias, gait problem, joint swelling, myalgias, neck pain and neck stiffness. Skin: Positive for color change. Negative for pallor, rash and wound. Neurological: Positive for paresthesias. Negative for weakness and numbness. Hematological: Negative for adenopathy. Physical Exam  Vitals signs reviewed. Constitutional:       Appearance: Normal appearance. Cardiovascular:      Rate and Rhythm: Normal rate and regular rhythm. Pulses:           Dorsalis pedis pulses are 2+ on the right side and 2+ on the left side. Posterior tibial pulses are 2+ on the right side and 2+ on the left side. Heart sounds: Normal heart sounds. Pulmonary:      Effort: Pulmonary effort is normal.      Breath sounds: Normal breath sounds. Musculoskeletal:      Lumbar back: He exhibits tenderness. He exhibits normal range of motion, no bony tenderness, no swelling and no edema. Skin:            Comments: Mottled coloring, no tenderness, no pain   Neurological:      Mental Status: He is alert. Media Information      Document Information     Wound      03/04/2020 15:53   Attached To: Office Visit on 3/4/20 with LYDIA Baptiste   Source Information     LYDIA Baptiste  [facility]       Assessment    1. Discoloration of skin of lower leg    2. Pain of left lower leg    3. Acute right-sided low back pain with right-sided sciatica        Plan    Rajeev Newsome was seen today for lower back pain and cellulitis. Diagnoses and all orders for this visit:    Discoloration of skin of lower leg  -     C-REACTIVE PROTEIN; Future  -     SEDIMENTATION RATE;  Future  -     CBC Auto Differential; Future  -     VL DUP LOWER EXTREMITY VENOUS LEFT; Future  -     I suspect that this is a vascular issue and not an infection. Will check CBC    Pain of left lower leg  -     C-REACTIVE PROTEIN; Future  -     SEDIMENTATION RATE; Future  -     CBC Auto Differential; Future  -     VL DUP LOWER EXTREMITY VENOUS LEFT; Future    Acute right-sided low back pain with right-sided sciatica  -     tiZANidine (ZANAFLEX) 4 MG tablet;  Take 1 tablet by mouth every 8 hours as needed (back pain)

## 2020-03-05 LAB
BASOPHILS ABSOLUTE: 0 K/UL (ref 0–0.2)
BASOPHILS RELATIVE PERCENT: 0.5 %
C-REACTIVE PROTEIN: 159 MG/L (ref 0–5.1)
EOSINOPHILS ABSOLUTE: 0.1 K/UL (ref 0–0.6)
EOSINOPHILS RELATIVE PERCENT: 1.4 %
HCT VFR BLD CALC: 46.5 % (ref 40.5–52.5)
HEMOGLOBIN: 16.3 G/DL (ref 13.5–17.5)
LYMPHOCYTES ABSOLUTE: 2.6 K/UL (ref 1–5.1)
LYMPHOCYTES RELATIVE PERCENT: 32.6 %
MCH RBC QN AUTO: 31.5 PG (ref 26–34)
MCHC RBC AUTO-ENTMCNC: 35 G/DL (ref 31–36)
MCV RBC AUTO: 89.9 FL (ref 80–100)
MONOCYTES ABSOLUTE: 1 K/UL (ref 0–1.3)
MONOCYTES RELATIVE PERCENT: 12.8 %
NEUTROPHILS ABSOLUTE: 4.3 K/UL (ref 1.7–7.7)
NEUTROPHILS RELATIVE PERCENT: 52.7 %
PDW BLD-RTO: 14.3 % (ref 12.4–15.4)
PLATELET # BLD: 194 K/UL (ref 135–450)
PMV BLD AUTO: 10 FL (ref 5–10.5)
RBC # BLD: 5.17 M/UL (ref 4.2–5.9)
SEDIMENTATION RATE, ERYTHROCYTE: 24 MM/HR (ref 0–15)
WBC # BLD: 8.1 K/UL (ref 4–11)

## 2020-03-05 ASSESSMENT — ENCOUNTER SYMPTOMS
COLOR CHANGE: 1
ABDOMINAL PAIN: 0
BOWEL INCONTINENCE: 0
BACK PAIN: 1

## 2020-03-06 ENCOUNTER — HOSPITAL ENCOUNTER (OUTPATIENT)
Dept: GENERAL RADIOLOGY | Age: 35
Discharge: HOME | End: 2020-03-06
Payer: COMMERCIAL

## 2020-03-06 ENCOUNTER — HOSPITAL ENCOUNTER (OUTPATIENT)
Age: 35
Discharge: HOME | End: 2020-03-06
Payer: COMMERCIAL

## 2020-03-06 PROCEDURE — 72100 X-RAY EXAM L-S SPINE 2/3 VWS: CPT

## 2020-03-06 RX ORDER — OXYCODONE HYDROCHLORIDE AND ACETAMINOPHEN 5; 325 MG/1; MG/1
1 TABLET ORAL EVERY 6 HOURS PRN
Qty: 12 TABLET | Refills: 0 | Status: SHIPPED | OUTPATIENT
Start: 2020-03-06 | End: 2020-03-09

## 2020-03-06 RX ORDER — METHYLPREDNISOLONE 4 MG/1
TABLET ORAL
Qty: 1 KIT | Refills: 0 | Status: SHIPPED | OUTPATIENT
Start: 2020-03-06 | End: 2020-03-12

## 2020-03-10 ENCOUNTER — HOSPITAL ENCOUNTER (OUTPATIENT)
Dept: VASCULAR LAB | Age: 35
Discharge: HOME | End: 2020-03-10
Payer: COMMERCIAL

## 2020-03-10 ENCOUNTER — TELEPHONE (OUTPATIENT)
Dept: FAMILY MEDICINE CLINIC | Age: 35
End: 2020-03-10

## 2020-03-10 PROCEDURE — 93971 EXTREMITY STUDY: CPT

## 2020-03-19 ENCOUNTER — OFFICE VISIT (OUTPATIENT)
Dept: FAMILY MEDICINE CLINIC | Age: 35
End: 2020-03-19
Payer: COMMERCIAL

## 2020-03-19 VITALS
HEIGHT: 67 IN | SYSTOLIC BLOOD PRESSURE: 134 MMHG | OXYGEN SATURATION: 99 % | BODY MASS INDEX: 35.38 KG/M2 | TEMPERATURE: 97.9 F | DIASTOLIC BLOOD PRESSURE: 76 MMHG | WEIGHT: 225.4 LBS | HEART RATE: 71 BPM | RESPIRATION RATE: 18 BRPM

## 2020-03-19 PROCEDURE — 99213 OFFICE O/P EST LOW 20 MIN: CPT | Performed by: PHYSICIAN ASSISTANT

## 2020-03-19 RX ORDER — PREDNISONE 10 MG/1
TABLET ORAL
Qty: 18 TABLET | Refills: 0 | Status: SHIPPED | OUTPATIENT
Start: 2020-03-19 | End: 2020-06-08

## 2020-03-19 ASSESSMENT — ENCOUNTER SYMPTOMS
COLOR CHANGE: 0
BOWEL INCONTINENCE: 0
WHEEZING: 0
COUGH: 0
SHORTNESS OF BREATH: 0
BACK PAIN: 1

## 2020-03-30 RX ORDER — HYDROCHLOROTHIAZIDE 25 MG/1
25 TABLET ORAL DAILY
Qty: 90 TABLET | Refills: 1 | Status: SHIPPED | OUTPATIENT
Start: 2020-03-30 | End: 2021-05-25 | Stop reason: ALTCHOICE

## 2020-03-30 NOTE — TELEPHONE ENCOUNTER
Refill Request     Last Seen: 3/19/2020    Last Written: 10/18/19 with 1 refill     Next Appointment:   Future Appointments   Date Time Provider Odin Rodarte   4/6/2020  9:00 AM Dali Thompson MD [facility]             Requested Prescriptions     Pending Prescriptions Disp Refills    hydroCHLOROthiazide (HYDRODIURIL) 25 MG tablet 90 tablet 1     Sig: Take 1 tablet by mouth daily

## 2020-06-01 ENCOUNTER — TELEPHONE (OUTPATIENT)
Dept: FAMILY MEDICINE CLINIC | Age: 35
End: 2020-06-01

## 2020-06-01 ENCOUNTER — PATIENT MESSAGE (OUTPATIENT)
Dept: FAMILY MEDICINE CLINIC | Age: 35
End: 2020-06-01

## 2020-06-01 RX ORDER — SULFAMETHOXAZOLE AND TRIMETHOPRIM 800; 160 MG/1; MG/1
1 TABLET ORAL 2 TIMES DAILY
Qty: 14 TABLET | Refills: 0 | Status: SHIPPED | OUTPATIENT
Start: 2020-06-01 | End: 2020-06-08

## 2020-06-08 ENCOUNTER — OFFICE VISIT (OUTPATIENT)
Dept: RHEUMATOLOGY | Age: 35
End: 2020-06-08
Payer: COMMERCIAL

## 2020-06-08 VITALS
TEMPERATURE: 98.1 F | WEIGHT: 222 LBS | DIASTOLIC BLOOD PRESSURE: 94 MMHG | SYSTOLIC BLOOD PRESSURE: 151 MMHG | BODY MASS INDEX: 35.68 KG/M2 | HEIGHT: 66 IN | HEART RATE: 67 BPM

## 2020-06-08 PROCEDURE — 99244 OFF/OP CNSLTJ NEW/EST MOD 40: CPT | Performed by: INTERNAL MEDICINE

## 2020-06-08 NOTE — LETTER
[facility] Rheumatology  [address] [address]  Phone: [phone]  Fax: [phone]    Rashad Haskins MD        June 8, 2020     Gissell Palacio[address]  [address]  [facility], [address] [place] [address] [facility]  [place] [address]    Patient: Vita Bear.  MR Number: <[id number]>  YOB: 1985  Date of Visit: 6/8/2020    Dear Dr. Gissell Palacio: Thank you for your referral. Progress note attached in visit summary. If you have questions, please do not hesitate to call me. I look forward to following Kirill Vásquez along with you.     Sincerely,        Rashad Haskins MD

## 2020-07-07 ENCOUNTER — TELEPHONE (OUTPATIENT)
Dept: FAMILY MEDICINE CLINIC | Age: 35
End: 2020-07-07

## 2020-07-07 RX ORDER — METHYLPREDNISOLONE 4 MG/1
TABLET ORAL
Qty: 1 KIT | Refills: 0 | Status: SHIPPED | OUTPATIENT
Start: 2020-07-07 | End: 2020-07-13

## 2020-07-07 NOTE — TELEPHONE ENCOUNTER
Pt. Wife calling to report patient is having re-occurring back pain. Recent flair up started on 7-4-2020. You have ordered steroids in the past that have worked well.  Requesting an RX for steriods

## 2021-05-23 ENCOUNTER — HOSPITAL ENCOUNTER (EMERGENCY)
Age: 36
Discharge: HOME | End: 2021-05-23
Payer: COMMERCIAL

## 2021-05-23 VITALS
HEART RATE: 74 BPM | TEMPERATURE: 98.3 F | HEIGHT: 67 IN | BODY MASS INDEX: 33.74 KG/M2 | RESPIRATION RATE: 16 BRPM | OXYGEN SATURATION: 98 % | DIASTOLIC BLOOD PRESSURE: 83 MMHG | WEIGHT: 215 LBS | SYSTOLIC BLOOD PRESSURE: 143 MMHG

## 2021-05-23 DIAGNOSIS — R21 RASH AND OTHER NONSPECIFIC SKIN ERUPTION: Primary | ICD-10-CM

## 2021-05-23 PROCEDURE — 99283 EMERGENCY DEPT VISIT LOW MDM: CPT

## 2021-05-23 RX ORDER — CLINDAMYCIN HYDROCHLORIDE 300 MG/1
300 CAPSULE ORAL 3 TIMES DAILY
Qty: 21 CAPSULE | Refills: 0 | Status: SHIPPED | OUTPATIENT
Start: 2021-05-23 | End: 2021-05-25

## 2021-05-23 ASSESSMENT — ENCOUNTER SYMPTOMS
ABDOMINAL PAIN: 0
SORE THROAT: 0
EYE PAIN: 0
NAUSEA: 0
COUGH: 0
BACK PAIN: 0
SHORTNESS OF BREATH: 0
VOMITING: 0

## 2021-05-23 ASSESSMENT — PAIN DESCRIPTION - DESCRIPTORS: DESCRIPTORS: ACHING

## 2021-05-23 NOTE — ED TRIAGE NOTES
Chief Complaint   Patient presents with    Leg Swelling     pt c/o left leg redness and swelling, intermittent problem x 7 years, has been treated multiple times, this time it doesn't seem to be going away

## 2021-05-23 NOTE — ED NOTES
Discharge instructions reviewed, patient verbalizes understanding. Denies questions/concerns at this time. Patient ambulatory out of ED in stable condition with all belongings.        Tc Manuel RN  05/23/21 5666

## 2021-05-24 NOTE — ED PROVIDER NOTES
[address] ED  EMERGENCY DEPARTMENT ENCOUNTER        Pt Name: Sophie Rachel MRN: [id number]  Birthdate 1985  Date of evaluation: 5/23/2021  Provider: LYDIA Evans  PCP: LYDIA Marquez  Note Started: 8:09 PM EDT       MEENU. I have evaluated this patient. My supervising physician was available for consultation. CHIEF COMPLAINT       Chief Complaint   Patient presents with    Leg Swelling     pt c/o left leg redness and swelling, intermittent problem x 7 years, has been treated multiple times, this time it doesn't seem to be going away       HISTORY OF PRESENT ILLNESS   (Location, Timing/Onset, Context/Setting, Quality, Duration, Modifying Factors, Severity, Associated Signs and Symptoms)  Note limiting factors. Sophie Rachel is a 30 y.o. male who presents with a Chief Complaint of left leg rash. Patient reports over the last 7 years he has had multiple episodes of erythematous rashes which have been diagnosed of cellulitis of his lower extremities. Notes the rash is improved with antibiotics, he is usually treated with clindamycin due to a in anaphylactic reaction to cefepime. He notes he was recently evaluated by an orthopedic surgeon for chronic right hip pain, they referred him to general surgery due to concern for possible inguinal hernia and had ordered CT of his hip for further evaluation. Patient has not received the results of the CT imaging. Notes that last week he had swelling of his right calf, then his right thigh which have resolved, over the last 3 days had swelling of his left lower leg consistent with his previous episodes of cellulitis. Notes it has been warm to touch. He denies fever, chest pain, shortness of breath, abdominal pain, nausea, vomiting, testicular pain or swelling, dysuria, urinary frequency. Nursing Notes were all reviewed and agreed with or any disagreements were addressed in the HPI.     REVIEW OF SYSTEMS    (2-9 systems for level 4, 10 or more for level 5)     Review of Systems   Constitutional: Negative for fever. HENT: Negative for sore throat. Eyes: Negative for pain and visual disturbance. Respiratory: Negative for cough and shortness of breath. Cardiovascular: Negative for chest pain. Gastrointestinal: Negative for abdominal pain, nausea and vomiting. Genitourinary: Negative for dysuria, frequency, scrotal swelling and testicular pain. Musculoskeletal: Negative for back pain and neck pain. Skin: Positive for rash. Neurological: Negative for weakness, numbness and headaches. Psychiatric/Behavioral: Negative for confusion. Positives and Pertinent negatives as per HPI. Except as noted above in the ROS, all other systems were reviewed and negative.        PAST MEDICAL HISTORY     Past Medical History:   Diagnosis Date    Hypertension          SURGICAL HISTORY     Past Surgical History:   Procedure Laterality Date    HIP SURGERY           CURRENTMEDICATIONS       Discharge Medication List as of 5/23/2021 12:14 PM      CONTINUE these medications which have NOT CHANGED    Details   hydroCHLOROthiazide (HYDRODIURIL) 25 MG tablet Take 1 tablet by mouth daily, Disp-90 tablet, R-1Normal               ALLERGIES     Cefepime    FAMILYHISTORY       Family History   Problem Relation Age of Onset    Diabetes Mother     High Cholesterol Mother     High Blood Pressure Mother     High Blood Pressure Father     High Cholesterol Father     Cancer Father         skin cancer    Heart Attack Maternal Aunt     Cancer Maternal Aunt         stomach cancer    Heart Attack Maternal Uncle     Alcohol Abuse Paternal Uncle     Stroke Maternal Grandfather     Heart Attack Maternal Grandfather           SOCIAL HISTORY       Social History     Tobacco Use    Smoking status: Never Smoker    Smokeless tobacco: Current User     Types: Snuff   Vaping Use    Vaping Use: Never used   Substance Use Topics    Alcohol use: Yes     Alcohol/week: 3.0 standard drinks     Types: 3 Cans of beer per week     Comment: weekly    Drug use: No       SCREENINGS    Bhavin Coma Scale  Eye Opening: Spontaneous  Best Verbal Response: Oriented  Best Motor Response: Obeys commands  Rowena Coma Scale Score: 15        PHYSICAL EXAM    (up to 7 for level 4, 8 or more for level 5)     ED Triage Vitals [05/23/21 1118]   BP Temp Temp Source Pulse Resp SpO2 Height Weight   139/87 98.3 °F (36.8 °C) Oral 72 16 98 % 5' 7\" (1.702 m) 215 lb (97.5 kg)       Physical Exam  Vitals reviewed. Constitutional:       Appearance: He is not diaphoretic. HENT:      Nose: No congestion or rhinorrhea. Eyes:      General: No scleral icterus. Conjunctiva/sclera: Conjunctivae normal.   Cardiovascular:      Rate and Rhythm: Normal rate and regular rhythm. Pulses: Normal pulses. Heart sounds: Normal heart sounds. No murmur heard. No friction rub. No gallop. Pulmonary:      Effort: Pulmonary effort is normal. No respiratory distress. Breath sounds: Normal breath sounds. No stridor. No wheezing, rhonchi or rales. Abdominal:      General: There is no distension. Palpations: Abdomen is soft. Tenderness: There is no abdominal tenderness. There is no right CVA tenderness, left CVA tenderness, guarding or rebound. Hernia: No hernia is present. Genitourinary:     Testes: Normal.   Musculoskeletal:         General: Normal range of motion. Cervical back: Normal range of motion and neck supple. Skin:     General: Skin is warm and dry. Capillary Refill: Capillary refill takes less than 2 seconds. Findings: Erythema present. Comments: Erythema of left lower leg without purulence or crepitus, pain or proportion. See photos. Neurological:      General: No focal deficit present. Mental Status: He is alert and oriented to person, place, and time. Sensory: No sensory deficit.       Motor: No weakness. Gait: Gait normal.   Psychiatric:         Mood and Affect: Mood normal.         Behavior: Behavior normal.                 DIAGNOSTIC RESULTS   LABS:    Labs Reviewed - No data to display    All other labs were within normal range or not returned as of this dictation. EKG: All EKG's are interpreted by the Emergency Department Physician in the absence of a cardiologist.  Please see their note for interpretation of EKG. RADIOLOGY:   Non-plain film images such as CT, Ultrasound and MRI are read by the radiologist. Plain radiographic images are visualized and preliminarily interpreted by the ED Provider with the below findings:        Interpretation per the Radiologist below, if available at the time of this note:    No orders to display     No results found. PROCEDURES   Unless otherwise noted below, none     Procedures    CRITICAL CARE TIME   N/A    CONSULTS:  None      EMERGENCY DEPARTMENT COURSE and DIFFERENTIAL DIAGNOSIS/MDM:   Vitals:    Vitals:    05/23/21 1118 05/23/21 1201 05/23/21 1217   BP: 139/87 (!) 142/73 (!) 143/83   Pulse: 72 70 74   Resp: 16 16 16   Temp: 98.3 °F (36.8 °C)     TempSrc: Oral     SpO2: 98% 99% 98%   Weight: 215 lb (97.5 kg)     Height: 5' 7\" (1.702 m)         Patient was given the following medications:  Medications - No data to display        61-year-old male presents emergency department for left leg rash. He is concerned for cellulitis has been treated for multiple episodes of cellulitis over the years. While the timeline of symptoms starting in the right leg, resolving, and now appearing and now left leg is unusual for cellulitis, it is a erythematous rash that is warm to touch. Do believe the risks of not treating for potential cellulitis outweigh the benefits of potentially avoiding side effects from antibiotics. Patient is agreeable with this and would like a prescription for antibiotics.   There is not pad proportion, crepitus or signs of

## 2021-05-25 ENCOUNTER — OFFICE VISIT (OUTPATIENT)
Dept: FAMILY MEDICINE CLINIC | Age: 36
End: 2021-05-25
Payer: COMMERCIAL

## 2021-05-25 VITALS
BODY MASS INDEX: 34.93 KG/M2 | HEART RATE: 74 BPM | WEIGHT: 223 LBS | OXYGEN SATURATION: 98 % | DIASTOLIC BLOOD PRESSURE: 88 MMHG | SYSTOLIC BLOOD PRESSURE: 138 MMHG

## 2021-05-25 DIAGNOSIS — Z11.4 ENCOUNTER FOR SCREENING FOR HIV: ICD-10-CM

## 2021-05-25 DIAGNOSIS — L03.116 CELLULITIS OF LEFT LOWER EXTREMITY: Primary | ICD-10-CM

## 2021-05-25 DIAGNOSIS — L03.116 CELLULITIS OF LEFT LOWER EXTREMITY: ICD-10-CM

## 2021-05-25 LAB
C-REACTIVE PROTEIN: 12.8 MG/L (ref 0–5.1)
IGA: 306 MG/DL (ref 70–400)
IGG: 863 MG/DL (ref 700–1600)
IGM: 58 MG/DL (ref 40–230)

## 2021-05-25 PROCEDURE — 99214 OFFICE O/P EST MOD 30 MIN: CPT | Performed by: PHYSICIAN ASSISTANT

## 2021-05-25 RX ORDER — CLINDAMYCIN HYDROCHLORIDE 150 MG/1
450 CAPSULE ORAL 3 TIMES DAILY
Qty: 90 CAPSULE | Refills: 0 | Status: SHIPPED | OUTPATIENT
Start: 2021-05-25 | End: 2021-06-04

## 2021-05-25 SDOH — ECONOMIC STABILITY: FOOD INSECURITY: WITHIN THE PAST 12 MONTHS, YOU WORRIED THAT YOUR FOOD WOULD RUN OUT BEFORE YOU GOT MONEY TO BUY MORE.: NEVER TRUE

## 2021-05-25 SDOH — ECONOMIC STABILITY: FOOD INSECURITY: WITHIN THE PAST 12 MONTHS, THE FOOD YOU BOUGHT JUST DIDN'T LAST AND YOU DIDN'T HAVE MONEY TO GET MORE.: NEVER TRUE

## 2021-05-25 ASSESSMENT — PATIENT HEALTH QUESTIONNAIRE - PHQ9
1. LITTLE INTEREST OR PLEASURE IN DOING THINGS: 0
SUM OF ALL RESPONSES TO PHQ QUESTIONS 1-9: 0

## 2021-05-25 ASSESSMENT — ENCOUNTER SYMPTOMS: COLOR CHANGE: 1

## 2021-05-25 NOTE — PROGRESS NOTES
Eun Pulidoorestes. 30 y.o. male    Chief Complaint   Patient presents with    Other     discoloration of bilateral legs, comes and goes        HPI  ER follow up on 05/23/21 for rash, presumed cellulitis  This is a reoccurring infection that first starts in the left leg before moving to the right leg. There is associated inguinal lymphadenopathy, fever, pitting edema and fatigue  Pt has followed with rheumatology who feels that this is not autoimmune related  Referred to Dr. Kris Orozco in dermatology, next appointment is 10/26/21  No hx of IV drug use or other reoccurring infections         Current Outpatient Medications:     clindamycin (CLEOCIN) 150 MG capsule, Take 3 capsules by mouth 3 times daily for 10 days, Disp: 90 capsule, Rfl: 0      Vitals:    05/25/21 1234   BP: 138/88   Pulse: 74   SpO2: 98%       Review of Systems   Constitutional: Positive for fatigue. Negative for activity change, appetite change, chills and fever. Cardiovascular: Positive for leg swelling. Musculoskeletal: Positive for myalgias. Negative for gait problem and joint swelling. Skin: Positive for color change. Neurological: Negative for weakness and numbness. Physical Exam  Constitutional:       Appearance: Normal appearance. He is obese. Cardiovascular:      Rate and Rhythm: Normal rate and regular rhythm. Heart sounds: Normal heart sounds. Pulmonary:      Effort: Pulmonary effort is normal.      Breath sounds: Normal breath sounds. Musculoskeletal:      Comments: 2+ pitting edema in left leg, 1+ pitting edema in the right leg   Skin:     Findings: Erythema present. Neurological:      Mental Status: He is alert. Assessment    1. Cellulitis of left lower extremity    2. Encounter for screening for HIV        Plan    Ayana Abarca was seen today for other. Diagnoses and all orders for this visit:    Cellulitis of left lower extremity  -     IGG, IGA, IGM;  Future  -     C-REACTIVE PROTEIN; Future  - SEDIMENTATION RATE; Future  -     clindamycin (CLEOCIN) 150 MG capsule; Take 3 capsules by mouth 3 times daily for 10 days  -     SEDIMENTATION RATE; Future  -     C-REACTIVE PROTEIN; Future  -     Will continue clindamycin until inflammatory markers are 0 and s/s of infection are resolved. Pt to return on Friday to complete second set of markers. Pt requesting FMLA and will send to our office via fax. Letter completed asking him to be off of work until Tuesday at least. Reviewed rheumatology notes. Consider consultation with ID. Low suspicion for vegetation of the heart. Encounter for screening for HIV  -     HIV Screen;  Future

## 2021-05-26 ENCOUNTER — PATIENT MESSAGE (OUTPATIENT)
Dept: FAMILY MEDICINE CLINIC | Age: 36
End: 2021-05-26

## 2021-05-26 LAB
HIV AG/AB: NORMAL
HIV ANTIGEN: NORMAL
HIV-1 ANTIBODY: NORMAL
HIV-2 AB: NORMAL
SEDIMENTATION RATE, ERYTHROCYTE: 16 MM/HR (ref 0–15)

## 2021-05-28 DIAGNOSIS — L03.116 CELLULITIS OF LEFT LOWER EXTREMITY: ICD-10-CM

## 2021-05-28 LAB
C-REACTIVE PROTEIN: 3.1 MG/L (ref 0–5.1)
SEDIMENTATION RATE, ERYTHROCYTE: 13 MM/HR (ref 0–15)

## 2021-11-03 ENCOUNTER — PATIENT MESSAGE (OUTPATIENT)
Dept: FAMILY MEDICINE CLINIC | Age: 36
End: 2021-11-03

## 2021-11-03 DIAGNOSIS — L03.90 RECURRENT CELLULITIS: Primary | ICD-10-CM

## 2021-11-03 RX ORDER — SULFAMETHOXAZOLE AND TRIMETHOPRIM 800; 160 MG/1; MG/1
1 TABLET ORAL 2 TIMES DAILY
Qty: 14 TABLET | Refills: 0 | Status: SHIPPED | OUTPATIENT
Start: 2021-11-03 | End: 2021-11-10

## 2021-12-06 ENCOUNTER — PATIENT MESSAGE (OUTPATIENT)
Dept: FAMILY MEDICINE CLINIC | Age: 36
End: 2021-12-06

## 2021-12-06 DIAGNOSIS — I89.0 LYMPHEDEMA: Primary | ICD-10-CM

## 2021-12-10 ENCOUNTER — HOSPITAL ENCOUNTER (OUTPATIENT)
Dept: PHYSICAL THERAPY | Age: 36
Setting detail: THERAPIES SERIES
Discharge: HOME | End: 2021-12-10
Payer: COMMERCIAL

## 2021-12-10 ENCOUNTER — PATIENT MESSAGE (OUTPATIENT)
Dept: FAMILY MEDICINE CLINIC | Age: 36
End: 2021-12-10

## 2021-12-10 PROCEDURE — 97161 PT EVAL LOW COMPLEX 20 MIN: CPT

## 2021-12-10 PROCEDURE — 97110 THERAPEUTIC EXERCISES: CPT

## 2021-12-10 NOTE — PROGRESS NOTES
The following patient has been evaluated for physical therapy services. Please review the attached evaluation and/or summary of the patient's plan of care, and verify that you agree by signing below and sending it back to our office. Thank you for this referral.    Physician signature_______________________ Date________________    Fax to:   [address] phone: [phone] Fax: [phone]      LYMPHEDEMA EVALUATION  Evaluation Date:  12/10/2021         Patient Name:  Ksenia Cole. YOB: 1985       Medical Diagnosis:  Lymphedema        ICD 10:  I89.0    Treatment Diagnosis: bilateral LE swelling more right than left. Onset Date: pt reports that it has been off and on for last 7-8 years      Referral Date:  12/6/2021     Referring Physician: LYDIA Vail        Visits Allowed/Insurance/Certification Information:  BCBS 48 visits PT/OT combined does not require authorization    Restrictions/Precautions:   C-SSRS Triggered by Intake questionnaire (Past 2 wk assessment):    [x] No, Questionnaire did not trigger screening.    [] Yes, Patient intake triggered further evaluation    [] C-SSRS Screening completed   [] PCP notified via Plan of Care   [] Emergency services notified  Pt Occupation/Job Duties:  Labor intense     Social support/Environment:   Lives with wife    Health History reviewed with pt:   [x]   Yes   []   No      HTN, hip surgery at 8-9 years perthes disease. DDD and pain in left hip   Swollen lymph nodes     SUBJECTIVE FINDINGS        History of Present Illness: Pt reports that swelling began at 23-21 years old. Increased last 7-8 years. Limited motion of bilateral hip and low back. Pt reports that he knows when he is going to have swelling. He states that groin lymph node gets swelling and then has flu like symptoms then swelling.        Bilateral hand swelling as well      Pain       Patient describes pain to be hip and low back mostly in left LE. Patient reports pain is  1/10 pain sitting and 4/10 walking in at present and  10/10 pain at its worst.  Worsened by work   Improved by rest       Current Functional Limitations:   [x]   Yes   []   No  Functional Complaints: sleeping, standing, walking, kneeling positional changes, driving, walking squatting, kneeling, lifting    PLOF:   []   No functional limitations   []   Pt with previous functional limitations  Comment:  Pt's sleep is affected? [x]   Yes   []   No    Chemotherapy:  [] Yes [x] No [] Current  [] Completed Date:_________    Radiation:  [] Yes [x] No [] Current  [] Completed Date:_________        Patient goal for therapy:  \"decrease occurrence of swelling\"    OBJECTIVE FINDINGS:  Observation: noted increase tone in bilateral LE; spoke with PCP and asked if ok to perform neurological assessment next visit. Was ok by PCP.     Vitals: Blood pressure______  O2 sat______  Pulse______ not assessed    Pitting  Moderate  Color  Normal  Skin Texture  Dry  Skin Temperature  Normal  Edema Rebound*:  Slow  *pressure applied x10 seconds    Signs of Constriction:  Condition of Nailbeds Swollen left 1and 2 then   R 1st feet     Skin Breakdown (indicate size & number-also note location on body drawings): none  Tinea (fungus): none  Papilloma-- benign tumor arising from an epithelial layer: none  Fibrotic areas: none  Warts-- a local growth of the outer layer of skin: none  Ulcers: none  SCARS: none visible  STEMMER SIGN; (positive/negative)     Stage of Lymphedema (Golematis)  Mild:  (less than 2 cm difference  Moderate (2-4 cm difference  Moderate/Severe ( 4-5cm difference  Severe (greater than 5 cm difference)    Definitions:  Papilloma=benign tumor arising from an epithelial layer  Wart=a local growth of the outer layer of skin  Brawny=does not indent    UE Range of motion_____________________________________________________    []  WNL  [] WFL      LE Range of motion_bilateral hip range of motion limited    []  WNL  [x] [facility]     GIRTH MEASUREMENT FLOW SHEET    Lower Extremity R L   Date 12/10 12/10   6 Inches above knee distal patella         47.2 48.9   Knee distal patella          38.3 36.8   7 Below knee distal patella 41.2 36.8           Proximal  malleolus 25.3 24.7   Midfoot-Figure 8/Navicular 54.9 54.5   Metatarsal heads                       23.9 23        TOTAL GIRTH 230.8   224.7    Greater right than left difference of 6.1 cm. GCODEs and Functional Outcome Measure:      LEFI  38/80    ASSESSMENT : Pt demonstrated increase in edema of right LE compared to left. Noted limitation in bilateral hip ROM. Problems          []   Decreased cervical ROM  [x]   Decreased LE ROM  []   Increased pain  []   Decreased flexibility  []   Abnormality of gait  []   Increased swelling  []   Poor posture/alignment  []   Decreased functional status     Rehabilitation Potential:  Good for goals listed below. Strengths for achieving goals include:  [x]   Pt motivated   []   PLOF   []   Family support   Limitations for achieving goals include:  []   None   [x]   Severity of condition     []   Cognitive   []   Lack of family support   []   Lack of resources  []   Co-morbidities     []   Other:         Prognosis:   []   Good   []   Fair   []   Poor    GOALS    Short Term Goals:  2   weeks Long Term Goals: 4-6  weeks   1). Establish HEP 1). Pt independent with HEP   2). Pain  5/10 or less at worst 2). Pain  2-3/10 or less at worst   3). Decrease overall girth by 2 cm 3). Decrease overall girth by 5 cm overall   4). Perform neurological assessment 4). Pt reports increased flexibility in bilateral hip region. 5). 5).   6). 6).      PLAN OF CARE     To see patient  2 x/week for   weeks for the following treatment interventions:    [x]   Therapeutic Exercise  [x]   Modalities of Choice:   []   Vasopneumatic pump    [x] Lymphatouch     [x]   Manual Therapy  [x]   Neuromuscular Re-Education  [] Gait Training   []   Therapeutic Activity  [x]   Lymphedema precautions and prevention  [x]   Use of compression garment knee highs   []   Other     Treatment Performed this visit :   25 minutes    Education on lymphatic system, lymphedema and treatment as well as screen of tone in bilateral LE.  Exercise: toe flexion and extension, AP, knee bends and hip ER/IR. Pt response to Tx: Pt demonstrated exercise with cueing and verbalized understanding of above exercise    Plan for Next Visit:    Perform neurological assessment.  Review lymphatic exercise    Timed Code Treatment Minutes:   25  minutes   Total Treatment Time:  55  minutes    Plan of care sent to provider:      [x]Faxed  []Co-signature    (attempts: 1[x] 2 []3[])         Medicare Cap total YTD:    [x]N/A  Workers Comp Time Stamp  [x]N/A   Time In:   Time Out:    Thank you for the referral of this patient.       Bob Diaz 89  License #[id number]

## 2021-12-17 ENCOUNTER — HOSPITAL ENCOUNTER (OUTPATIENT)
Dept: PHYSICAL THERAPY | Age: 36
Setting detail: THERAPIES SERIES
Discharge: HOME | End: 2021-12-17
Payer: COMMERCIAL

## 2021-12-17 PROCEDURE — 97140 MANUAL THERAPY 1/> REGIONS: CPT

## 2021-12-17 PROCEDURE — 97110 THERAPEUTIC EXERCISES: CPT

## 2021-12-17 NOTE — FLOWSHEET NOTE
Physical Therapy Daily Treatment Note    [x]Daily Tx Note    []Progress Note    [] Discharge Summary         Date:  2021    Patient Name:  Sharyn Conway :  1985  MRN: [id number]      Medical/Treatment Diagnosis Information:  ·  Lymphedema I89.0    ·  bilateral LE swelling more right than left. Insurance/Certification information:  BCBS 50 visits PT/OT      Physician Information:  LYDIA Devi     Plan of care signed :  []  Yes  [x] No  [x]  Cosign []  Fax    Date of Patient follow up with Physician:     Is this a Progress Report:     []  Yes  [x]  No      If Yes:  Date Range for reporting period:  Beginning 2021  Ending    Progress report will be due (10 Rx or 30 days whichever is less):       Recertification will be due (POC Duration  / 90 days whichever is less):       Visit # Insurance Allowable Auth Required      []  Yes []  No        Functional Scale:  LEFI     Date   Score 38/80    Latex Allergy:  [x]NO      []YES  Preferred Language for Healthcare:   [x]English       []other:    RESTRICTIONS/PRECAUTIONS:      SUBJECTIVE:  See eval    Pain level:  4/10    Plan Moving Forward/ For next visit:    Exercises for flexibility    Monitor edema and possible MLD if needed      OBJECTIVE: See eval    Exercises/Interventions:     Exercises in bold performed in department today. Items not bolded are carried forward from prior visits for continuity of the record. Exercise/Equipment Resistance/Repetitions HEP Other comments    toe flexion and extension, AP, knee bends and hip ER/IR.      []     bilateral hamstring stretch   3 reps hold 20-30 secs []     bilateral gastroc stretch  3 reps hold 20-30 secs []    Single knee rock   1x10 []        []        []        []        []        []          []         []        []        []        []        []        []        []        []      Therapeutic Exercise/Home Exercise Program:   25 minutes  See above  Access code [email or web address]    Therapeutic Activity:  0 minutes     Gait: 0 minutes    Neuromuscular Re-Education:  0 minutes      Canalith Repositioning Procedure:      Manual Therapy:  20 minutes  Neurological testing: reflexes 2+3-/3 bilateral UE and LE. One beat clonus bilateral LE. Babinski neg bilaterally. Light touch bilateral symmetrical    Modalities: 0 minutes    ASSESSMENT:  Pt demonstrates less noted edema in bilateral LE. Goals:   GOALS    Short Term Goals:  2   weeks Long Term Goals: 4-6  weeks   1). Establish HEP 1). Pt independent with HEP   2). Pain  5/10 or less at worst 2). Pain  2-3/10 or less at worst   3). Decrease overall girth by 2 cm 3). Decrease overall girth by 5 cm overall   4). Perform neurological assessment 4). Pt reports increased flexibility in bilateral hip region. 5). 5).   6). 6). Overall Progression Towards Functional goals/ Treatment Progress Update:  [] Patient is progressing as expected towards functional goals listed. [] Progression is slowed due to complexities/Impairments listed. [] Progression has been slowed due to co-morbidities.   [x] Plan just implemented, too soon to assess goals progression <30days   [] Goals require adjustment due to lack of progress  [] Patient is not progressing as expected and requires additional follow up with physician  [] Other    Prognosis for POC: [x] Good [] Fair  [] Poor    Patient requires continued skilled intervention: [x] Yes  [] No    Treatment/Activity Tolerance:  [x] Patient able to complete treatment  [] Patient limited by fatigue  [] Patient limited by pain    [] Patient limited by other medical complications  [] Other:       PLAN: See eval  [x] Continue per plan of care [] Alter current plan (see comments above)  [] Plan of care initiated [] Hold pending MD visit [] Discharge    Therapeutic Exercise and NMR EXR  [x] (20164) Provided verbal/tactile cueing for activities related to strengthening, flexibility, endurance, ROM  for improvements in proximal strength and core control with self care, mobility, lifting and ambulation.  [] (13497) Provided verbal/tactile cueing for activities related to improving balance, coordination, kinesthetic sense, posture, motor skill, proprioception  to assist with core control in self care, mobility, lifting, and ambulation. Therapeutic Activities and Gait:    [] (05780 or 48903) Provided verbal/tactile cueing for activities related to improving balance, coordination, kinesthetic sense, posture, motor skill, proprioception and motor activation to allow for proper function  with self care and ADLs  [] (38304) Provided training and instruction to the patient for proper core and proximal hip recruitment and positioning with ambulation re-education     Home Exercise Program:    [x] (09467) Reviewed/Progressed HEP activities related to strengthening, flexibility, endurance, ROM of core, proximal hip and LE for functional self-care, mobility, lifting and ambulation   [] (85170) Reviewed/Progressed HEP activities related to improving balance, coordination, kinesthetic sense, posture, motor skill, proprioception of core, proximal hip and LE for self care, mobility, lifting, and ambulation      Manual Treatments:  PROM / STM / Oscillations-Mobs:  G-I, II, III, IV (PA's, Inf., Post.)  [x] (36985) Provided manual therapy to mobilize proximal hip and LS spine soft tissue/joints for the purpose of modulating pain, promoting relaxation,  increasing ROM, reducing/eliminating soft tissue swelling/inflammation/restriction, improving soft tissue extensibility and allowing for proper ROM for normal function with self care, mobility, lifting and ambulation.      []CRP:  Canalith Repositioning procedure for the assessment, treatment and education of BPPV    Modalities:       Charges:  Timed Code Treatment Minutes: 40   Total Treatment Minutes: 40     Medicare Cap total YTD:        [x]N/A  Workers Comp Time Stamp  (Per CPT and Total Treatment) [x]N/A   Time In:   Time Out:       [] EVAL    [] Dry Needling  [x] SJ(76800)   x 2    [] EStim Unattended 21572  [] NMR (83187)  x     [] Estim Attended  70341  [x] Manual (95466)  x 1     [] Mechanical Txn 10344  [] TA    x     [] Ultrasound  [] Gait   x  [] Vaso  [] CRP    [] Ionto           [] Other:        Electronically signed by: Cuauhtemoc Berrios PT, [id number]      Note: If patient does not return for scheduled/ recommended follow up visits, this note will serve as a discharge from care along with most recent update on progress.

## 2021-12-28 ENCOUNTER — OFFICE VISIT (OUTPATIENT)
Dept: ORTHOPEDIC SURGERY | Age: 36
End: 2021-12-28
Payer: COMMERCIAL

## 2021-12-28 VITALS — BODY MASS INDEX: 35 KG/M2 | WEIGHT: 223 LBS | HEIGHT: 67 IN

## 2021-12-28 DIAGNOSIS — M25.859 FEMORAL ACETABULAR IMPINGEMENT: ICD-10-CM

## 2021-12-28 DIAGNOSIS — M16.12 PRIMARY OSTEOARTHRITIS OF LEFT HIP: Primary | ICD-10-CM

## 2021-12-28 DIAGNOSIS — M25.552 HIP PAIN, LEFT: ICD-10-CM

## 2021-12-28 DIAGNOSIS — M25.551 RIGHT HIP PAIN: ICD-10-CM

## 2021-12-28 PROCEDURE — 99203 OFFICE O/P NEW LOW 30 MIN: CPT | Performed by: ORTHOPAEDIC SURGERY

## 2021-12-28 NOTE — PROGRESS NOTES
Chief Complaint    Hip Pain (Bilateral hip pain, ongoing many years, PCP ordered PT duration has been 2 weeks )       History of Present Illness:  Matias Echols is a 29 y.o. male presents for consultation at request of Carney Halsted, PA, for left hip pain he has had with some in the right hip also. The left hip predominates his symptoms though. He grades it 6/10 and states that range of motion is difficult and extremes cause sharp pain. 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 He says he has fairly good range of motion of the right side and just has some soreness but does note pain with motion of his left hip. Medical History:  Patient's medications, allergies, past medical, surgical, social and family histories were reviewed and updated as appropriate. Review of Systems:  Relevant review of systems reviewed and available in the patient's chart    Vital Signs: There were no vitals filed for this visit. General Exam:   Constitutional: Patient is adequately groomed with no evidence of malnutrition  DTRs: Deep tendon reflexes are intact  Mental Status: The patient is oriented to time, place and person. The patient's mood and affect are appropriate. Lymphatic: The lymphatic examination bilaterally reveals all areas to be without enlargement or induration. Vascular: Examination reveals no swelling or calf tenderness. Peripheral pulses are palpable and 2+. Neurological: The patient has good coordination. There is no weakness or sensory deficit.     Left hip Examination:    Inspection: No visible lesions    Palpation: Generalized soreness around the hip    Range of Motion: Substantially limited having about 15 degrees of internal rotation and 15 degrees of external rotation 100 degrees of flexion 25 degrees of abduction. Strength: Limited by pain left hip    Special Tests: Negative straight leg raise    Skin: There are no rashes, ulcerations or lesions. Gait: Slightly antalgic    Reflex unremarkable    Additional Comments:       Additional Examinations:             Radiology:     X-rays 3 views of the left hip include the right demonstrates moderate arthritis with a cam lesion, and in the right hip, he has Legg-Perthes with much less arthritic degeneration. Assessment : Left hip pain secondary to arthritis with femoral acetabular impingement possible labral tear  History of Legg-Perthes in the right hip    Impression:  Encounter Diagnoses   Name Primary?  Hip pain, left     Right hip pain     Primary osteoarthritis of left hip Yes    Femoral acetabular impingement        Office Procedures:  Orders Placed This Encounter   Procedures    MRI HIP LEFT WO CONTRAST     Standing Status:   Future     Standing Expiration Date:   12/28/2022     Scheduling Instructions:      Evelin rod     Order Specific Question:   Reason for exam:     Answer:   r/o labral tear       Treatment Plan:  At this time would recommend MRI of the left hip and referral to Dr. Shavon Rollins for consultation

## 2022-01-05 ENCOUNTER — TELEPHONE (OUTPATIENT)
Dept: ORTHOPEDIC SURGERY | Age: 37
End: 2022-01-05

## 2022-01-06 ENCOUNTER — HOSPITAL ENCOUNTER (OUTPATIENT)
Age: 37
Discharge: HOME | End: 2022-01-06
Payer: COMMERCIAL

## 2022-01-06 LAB
T4 FREE: 0.9 NG/DL (ref 0.9–1.8)
TSH SERPL DL<=0.05 MIU/L-ACNC: 1.32 UIU/ML (ref 0.27–4.2)

## 2022-01-06 PROCEDURE — 36415 COLL VENOUS BLD VENIPUNCTURE: CPT

## 2022-01-06 PROCEDURE — 84445 ASSAY OF TSI GLOBULIN: CPT

## 2022-01-06 PROCEDURE — 84443 ASSAY THYROID STIM HORMONE: CPT

## 2022-01-06 PROCEDURE — 84439 ASSAY OF FREE THYROXINE: CPT

## 2022-01-06 NOTE — PROGRESS NOTES
Physical Therapy  Unable to reach patient by phone number provided.   No voicemail & no answer x 2 to cancel 01 07 2022

## 2022-01-07 ENCOUNTER — TELEPHONE (OUTPATIENT)
Dept: ORTHOPEDIC SURGERY | Age: 37
End: 2022-01-07

## 2022-01-07 ENCOUNTER — HOSPITAL ENCOUNTER (OUTPATIENT)
Dept: PHYSICAL THERAPY | Age: 37
Setting detail: THERAPIES SERIES
Discharge: HOME | End: 2022-01-07

## 2022-01-07 NOTE — TELEPHONE ENCOUNTER
Tried to call patient  Unable to leave a message   Patient is scheduled for [facility] mri left hip jan. 13, arrival at 1:30 pm

## 2022-01-10 LAB — THYROID STIMULATING IMMUNOGLOBULIN: <0.1 IU/L

## 2022-01-13 ENCOUNTER — HOSPITAL ENCOUNTER (OUTPATIENT)
Dept: MRI IMAGING | Age: 37
Discharge: HOME | End: 2022-01-13
Payer: COMMERCIAL

## 2022-01-13 DIAGNOSIS — M25.552 HIP PAIN, LEFT: ICD-10-CM

## 2022-01-13 PROCEDURE — 73721 MRI JNT OF LWR EXTRE W/O DYE: CPT

## 2022-01-16 ENCOUNTER — HOSPITAL ENCOUNTER (EMERGENCY)
Age: 37
Discharge: HOME | End: 2022-01-17
Attending: EMERGENCY MEDICINE
Payer: COMMERCIAL

## 2022-01-16 DIAGNOSIS — R60.9 DEPENDENT EDEMA: ICD-10-CM

## 2022-01-16 DIAGNOSIS — I10 UNCONTROLLED HYPERTENSION: Primary | ICD-10-CM

## 2022-01-16 LAB
A/G RATIO: 1.6 (ref 1.1–2.2)
ALBUMIN SERPL-MCNC: 4.4 G/DL (ref 3.4–5)
ALP BLD-CCNC: 71 U/L (ref 40–129)
ALT SERPL-CCNC: 42 U/L (ref 10–40)
ANION GAP SERPL CALCULATED.3IONS-SCNC: 14 MMOL/L (ref 3–16)
AST SERPL-CCNC: 19 U/L (ref 15–37)
BASOPHILS ABSOLUTE: 0.1 K/UL (ref 0–0.2)
BASOPHILS RELATIVE PERCENT: 0.5 %
BILIRUB SERPL-MCNC: 0.6 MG/DL (ref 0–1)
BUN BLDV-MCNC: 12 MG/DL (ref 7–20)
CALCIUM SERPL-MCNC: 8.8 MG/DL (ref 8.3–10.6)
CHLORIDE BLD-SCNC: 98 MMOL/L (ref 99–110)
CO2: 24 MMOL/L (ref 21–32)
CREAT SERPL-MCNC: 0.7 MG/DL (ref 0.9–1.3)
EOSINOPHILS ABSOLUTE: 0.2 K/UL (ref 0–0.6)
EOSINOPHILS RELATIVE PERCENT: 1.3 %
GFR AFRICAN AMERICAN: >60
GFR NON-AFRICAN AMERICAN: >60
GLUCOSE BLD-MCNC: 114 MG/DL (ref 70–99)
HCT VFR BLD CALC: 46.9 % (ref 40.5–52.5)
HEMOGLOBIN: 16.1 G/DL (ref 13.5–17.5)
LYMPHOCYTES ABSOLUTE: 2.8 K/UL (ref 1–5.1)
LYMPHOCYTES RELATIVE PERCENT: 21.9 %
MCH RBC QN AUTO: 31.9 PG (ref 26–34)
MCHC RBC AUTO-ENTMCNC: 34.2 G/DL (ref 31–36)
MCV RBC AUTO: 93.2 FL (ref 80–100)
MONOCYTES ABSOLUTE: 1.4 K/UL (ref 0–1.3)
MONOCYTES RELATIVE PERCENT: 10.9 %
NEUTROPHILS ABSOLUTE: 8.3 K/UL (ref 1.7–7.7)
NEUTROPHILS RELATIVE PERCENT: 65.4 %
PDW BLD-RTO: 13.5 % (ref 12.4–15.4)
PLATELET # BLD: 224 K/UL (ref 135–450)
PMV BLD AUTO: 7.9 FL (ref 5–10.5)
POTASSIUM REFLEX MAGNESIUM: 3.9 MMOL/L (ref 3.5–5.1)
RBC # BLD: 5.03 M/UL (ref 4.2–5.9)
SODIUM BLD-SCNC: 136 MMOL/L (ref 136–145)
TOTAL PROTEIN: 7.2 G/DL (ref 6.4–8.2)
WBC # BLD: 12.7 K/UL (ref 4–11)

## 2022-01-16 PROCEDURE — 85025 COMPLETE CBC W/AUTO DIFF WBC: CPT

## 2022-01-16 PROCEDURE — 80053 COMPREHEN METABOLIC PANEL: CPT

## 2022-01-16 PROCEDURE — 84484 ASSAY OF TROPONIN QUANT: CPT

## 2022-01-16 PROCEDURE — 99284 EMERGENCY DEPT VISIT MOD MDM: CPT

## 2022-01-16 PROCEDURE — 83880 ASSAY OF NATRIURETIC PEPTIDE: CPT

## 2022-01-16 RX ORDER — FAMOTIDINE 10 MG
10 TABLET ORAL 2 TIMES DAILY PRN
COMMUNITY

## 2022-01-16 ASSESSMENT — ENCOUNTER SYMPTOMS
RHINORRHEA: 1
COUGH: 1
ABDOMINAL PAIN: 0
BACK PAIN: 0
EYE PAIN: 0

## 2022-01-16 ASSESSMENT — PAIN SCALES - GENERAL: PAINLEVEL_OUTOF10: 0

## 2022-01-17 ENCOUNTER — APPOINTMENT (OUTPATIENT)
Dept: GENERAL RADIOLOGY | Age: 37
End: 2022-01-17
Payer: COMMERCIAL

## 2022-01-17 VITALS
WEIGHT: 220 LBS | OXYGEN SATURATION: 96 % | SYSTOLIC BLOOD PRESSURE: 186 MMHG | TEMPERATURE: 98.2 F | RESPIRATION RATE: 15 BRPM | HEIGHT: 67 IN | BODY MASS INDEX: 34.53 KG/M2 | DIASTOLIC BLOOD PRESSURE: 99 MMHG | HEART RATE: 103 BPM

## 2022-01-17 LAB
EKG ATRIAL RATE: 82 BPM
EKG DIAGNOSIS: NORMAL
EKG P AXIS: 16 DEGREES
EKG P-R INTERVAL: 152 MS
EKG Q-T INTERVAL: 364 MS
EKG QRS DURATION: 94 MS
EKG QTC CALCULATION (BAZETT): 425 MS
EKG R AXIS: 49 DEGREES
EKG T AXIS: 18 DEGREES
EKG VENTRICULAR RATE: 82 BPM
PRO-BNP: 10 PG/ML (ref 0–124)
TROPONIN: <0.01 NG/ML

## 2022-01-17 PROCEDURE — 93010 ELECTROCARDIOGRAM REPORT: CPT | Performed by: INTERNAL MEDICINE

## 2022-01-17 PROCEDURE — 71046 X-RAY EXAM CHEST 2 VIEWS: CPT

## 2022-01-17 PROCEDURE — 93005 ELECTROCARDIOGRAM TRACING: CPT | Performed by: EMERGENCY MEDICINE

## 2022-01-17 RX ORDER — HYDROCHLOROTHIAZIDE 25 MG/1
25 TABLET ORAL EVERY MORNING
Qty: 30 TABLET | Refills: 0 | Status: SHIPPED | OUTPATIENT
Start: 2022-01-17 | End: 2022-02-11 | Stop reason: SDUPTHER

## 2022-01-17 NOTE — ED PROVIDER NOTES
[address] [facility]  ED  EMERGENCY DEPARTMENT ENCOUNTER      Pt Name: Anuel Antoine MRN: [id number]  Birthdate 1985  Date of evaluation: 1/16/2022  Provider: Pily Calero MD    CHIEF COMPLAINT       Chief Complaint   Patient presents with    Numbness     Numbness and tingling, intermittent, 2 hrs but has had this happen before multiple times. Smokes marijuana. Drank 30+ beers last night.  Leg Swelling         HISTORY OF PRESENT ILLNESS   (Location/Symptom, Timing/Onset,Context/Setting, Quality, Duration, Modifying Factors, Severity)  Note limiting factors. Anuel Antoine is a 29 y.o. male who presents to the emergency department for bilateral leg swelling for years and the back of his hands. But tonight he felt a bit anxious with fluttering in his chest. He also has osteonecrosis in both hip. Right hip had surgery as a kid. Early like he was freaking out and was hyperventilating until he had numbness to feet and hands. Patient feels much better now. Used to be on lisinopril and a water pill. Was discontinued about 3 years ago. Nursing notes were reviewed. REVIEW OF SYSTEMS    (2-9 systems for level 4, 10 or more for level 5)     Review of Systems   Constitutional: Negative for fever. HENT: Positive for congestion and rhinorrhea. Chronic   Eyes: Negative for pain. Respiratory: Positive for cough. Chronic, smokes 3 packs a week   Gastrointestinal: Negative for abdominal pain. Genitourinary: Negative for flank pain. Musculoskeletal: Negative for back pain. Skin: Negative for rash. Neurological: Positive for light-headedness. Negative for headaches. Hematological: Does not bruise/bleed easily.          PAST MEDICAL HISTORY     Past Medical History:   Diagnosis Date    Arthritis     H/O degenerative disc disease     Hypertension     Osteoarthritis          SURGICALHISTORY       Past Surgical History:   Procedure Laterality Date    HIP SURGERY           CURRENT MEDICATIONS       Discharge Medication List as of 1/17/2022  1:59 AM      CONTINUE these medications which have NOT CHANGED    Details   famotidine (PEPCID) 10 MG tablet Take 10 mg by mouth 2 times daily as neededHistorical Med             ALLERGIES     Cefepime    FAMILY HISTORY       Family History   Problem Relation Age of Onset    Diabetes Mother     High Cholesterol Mother     High Blood Pressure Mother     High Blood Pressure Father     High Cholesterol Father     Cancer Father         skin cancer    Heart Attack Maternal Aunt     Cancer Maternal Aunt         stomach cancer    Heart Attack Maternal Uncle     Alcohol Abuse Paternal Uncle     Stroke Maternal Grandfather     Heart Attack Maternal Grandfather           SOCIAL HISTORY       Social History     Socioeconomic History    Marital status:      Spouse name: None    Number of children: None    Years of education: None    Highest education level: None   Occupational History    None   Tobacco Use    Smoking status: Current Every Day Smoker     Packs/day: 0.25     Types: Cigarettes    Smokeless tobacco: Current User     Types: Snuff   Vaping Use    Vaping Use: Never used   Substance and Sexual Activity    Alcohol use: Yes     Alcohol/week: 30.0 standard drinks     Types: 30 Cans of beer per week     Comment: daily    Drug use: Yes     Types: Marijuana [employer])    Sexual activity: Yes     Partners: Female   Other Topics Concern    None   Social History Narrative    None     Social Determinants of Health     Financial Resource Strain: Low Risk     Difficulty of Paying Living Expenses: Not hard at all   Food Insecurity: No Food Insecurity    Worried About Running Out of Food in the Last Year: Never true    [address] in the Last Year: Never true   Transportation Needs:     Lack of Transportation (Medical): Not on file    Lack of Transportation (Non-Medical):  Not on file   Physical Activity:  Days of Exercise per Week: Not on file    Minutes of Exercise per Session: Not on file   Stress:     Feeling of Stress : Not on file   Social Connections:     Frequency of Communication with Friends and Family: Not on file    Frequency of Social Gatherings with Friends and Family: Not on file    Attends Jehovah's witness Services: Not on file    Active Member of [address] [place] [address] [email or web address] or Organizations: Not on file    Attends Club or Organization Meetings: Not on file    Marital Status: Not on file   Intimate Partner Violence:     Fear of Current or Ex-Partner: Not on file    Emotionally Abused: Not on file    Physically Abused: Not on file    Sexually Abused: Not on file   Housing Stability:     Unable to Pay for Housing in the Last Year: Not on file    Number of [place] in the Last Year: Not on file    Unstable Housing in the Last Year: Not on file       SCREENINGS   NIH Stroke Scale  Interval: Baseline  Level of Consciousness (1a. ): Alert  LOC Questions (1b. ): Answers both correctly  LOC Commands (1c. ): Performs both tasks correctly  Best Gaze (2. ): Normal  Visual (3. ): No visual loss  Facial Palsy (4. ): Normal symmetrical movement  Motor Arm, Left (5a. ): No drift  Motor Arm, Right (5b. ): No drift  Motor Leg, Left (6a. ): No drift  Motor Leg, Right (6b. ): No drift  Limb Ataxia (7. ): Absent  Sensory (8. ): Normal  Best Language (9. ): No aphasia  Dysarthria (10. ): Normal  Extinction and Inattention (11): No abnormality  Total: 0Glasgow Coma Scale  Eye Opening: Spontaneous  Best Verbal Response: Oriented  Best Motor Response: Obeys commands  [place] Coma Scale Score: 15        PHYSICAL EXAM    (up to 7 for level 4, 8 or more for level 5)     ED Triage Vitals [01/16/22 2308]   BP Temp Temp Source Pulse Resp SpO2 Height Weight   (!) 190/94 98.2 °F (36.8 °C) Oral 92 18 97 % 5' 7\" (1.702 m) 220 lb (99.8 kg)       Physical Exam  Vitals and nursing note reviewed.    Constitutional:       Appearance: Normal appearance. He is well-developed. He is not ill-appearing. HENT:      Head: Normocephalic and atraumatic. Right Ear: External ear normal.      Left Ear: External ear normal.      Nose: Nose normal.   Eyes:      General: No scleral icterus. Right eye: No discharge. Left eye: No discharge. Conjunctiva/sclera: Conjunctivae normal.   Cardiovascular:      Rate and Rhythm: Normal rate and regular rhythm. Heart sounds: Normal heart sounds. Pulmonary:      Effort: Pulmonary effort is normal. No respiratory distress. Breath sounds: Normal breath sounds. No wheezing or rales. Abdominal:      General: Bowel sounds are normal. There is no distension. Palpations: Abdomen is soft. Tenderness: There is no abdominal tenderness. There is no guarding or rebound. Musculoskeletal:      Cervical back: Neck supple. Right lower leg: Edema present. Left lower leg: Edema present. Skin:     Coloration: Skin is not pale. Comments: Flushed   Neurological:      Mental Status: He is alert. Psychiatric:         Mood and Affect: Mood normal.         Behavior: Behavior normal.             DIAGNOSTIC RESULTS     EKG: All EKG's are interpreted by the Emergency Department Physician who either signs or Co-signs this chart in the absence of a cardiologist.    12 lead EKG shows Normal sinus rhythm, PA interval QRS QTC normal.  Normal axis. No acute ischemic changes    RADIOLOGY:   Non-plain film images such as CT, Ultrasound and MRI are read by the radiologist. Plain radiographic images are visualized and preliminarily interpreted by the emergency physician with the below findings:        Interpretation per the Radiologist below, if available at the time of this note:    XR CHEST (2 VW)   Final Result   No acute cardiopulmonary process.                ED BEDSIDE ULTRASOUND:   Performed by ED Physician - none    LABS:  Labs Reviewed   CBC WITH AUTO DIFFERENTIAL - Abnormal; Notable for the following components:       Result Value    WBC 12.7 (*)     Neutrophils Absolute 8.3 (*)     Monocytes Absolute 1.4 (*)     All other components within normal limits    Narrative:     Performed at:  [place] [address] [facility]   Phone [phone]   COMPREHENSIVE METABOLIC PANEL W/ REFLEX TO MG FOR LOW K - Abnormal; Notable for the following components:    Chloride 98 (*)     Glucose 114 (*)     CREATININE 0.7 (*)     ALT 42 (*)     All other components within normal limits    Narrative:     Performed at:  [place] [address] [facility]   Phone [phone]   TROPONIN    Narrative:     Performed at:  [place] [address] [facility]   Phone [phone]    Narrative:     Performed at:  [address], [facility]   Phone [phone]       All other labs were within normal range or not returned as of this dictation. EMERGENCY DEPARTMENT COURSE and DIFFERENTIAL DIAGNOSIS/MDM:   Vitals:    Vitals:    01/16/22 2308 01/16/22 2342   BP: (!) 190/94 (!) 186/99   Pulse: 92 103   Resp: 18 15   Temp: 98.2 °F (36.8 °C)    TempSrc: Oral    SpO2: 97% 96%   Weight: 220 lb (99.8 kg)    Height: 5' 7\" (1.702 m)      Adolescent male who has had dependent edema for some time. Tonight he felt very anxious and he was worried and decided to come to the emergency room. Laboratory studies chest x-ray EKG ordered. EKG shows no acute process. Laboratory studies show mild leukocytosis. He has mild hypochloremia and ALT is slightly elevated. Chest x-ray shows no acute process. Patient had been on antihypertensives in the past but it has been about 3 years since he is taking this his blood pressure is elevated. He had been on a diuretic.   I think that being placed on diuretic would be helpful for the patient both for his blood pressure and his edema and therefore hydrochlorothiazide as prescribed. Patient is strongly encouraged to continue care in the outpatient setting with his primary care provider based on history physical exam and diagnostic work-up I believe that he has lower suspicious life-threatening condition that requires emergent admission or further testing. CRITICAL CARE TIME   None       CONSULTS:  None    PROCEDURES:       Procedures    FINAL IMPRESSION      1. Uncontrolled hypertension    2.  Dependent edema          DISPOSITION/PLAN   DISPOSITION Decision To Discharge 01/17/2022 01:47:09 AM      PATIENT REFERREDTO:  LYDIA Oneil  [address] [place] [id number]  [phone]      keep your scheduled appointment      DISCHARGEMEDICATIONS:  Discharge Medication List as of 1/17/2022  1:59 AM      START taking these medications    Details   hydroCHLOROthiazide (HYDRODIURIL) 25 MG tablet Take 1 tablet by mouth every morning, Disp-30 tablet, R-0Normal                (Please note that portions of this note were completed with a voice recognition program.  Efforts were made to edit the dictations but occasionally words are mis-transcribed.)    Josh Rojas MD (electronically signed)  Attending Emergency Physician          Josh Rojas MD  01/17/22 6902

## 2022-01-17 NOTE — ED NOTES
Pt called 911 d/t numbness and tingling in fingers and toes. Hx of intermittent numbness and tingling, worse today. Started 2 hrs ago. Also c/o edema BUE and BLE. Per pt, drank 30+ beers yesterday, and \"started drinking again around 11 this morning. \" Also smokes cigarettes and weed almost daily.       Gerhardt Dance, RN  01/16/22 2603 normal...

## 2022-01-17 NOTE — ED NOTES
Bed: 15  Expected date:   Expected time:   Means of arrival:   Comments:  [facility] [address] [place] [address] Bozena Neumann  01/16/22 5474

## 2022-01-19 ENCOUNTER — TELEPHONE (OUTPATIENT)
Dept: FAMILY MEDICINE CLINIC | Age: 37
End: 2022-01-19

## 2022-01-20 ENCOUNTER — OFFICE VISIT (OUTPATIENT)
Dept: ORTHOPEDIC SURGERY | Age: 37
End: 2022-01-20
Payer: COMMERCIAL

## 2022-01-20 VITALS — BODY MASS INDEX: 34.53 KG/M2 | HEIGHT: 67 IN | WEIGHT: 220 LBS

## 2022-01-20 DIAGNOSIS — M25.859 FEMORAL ACETABULAR IMPINGEMENT: ICD-10-CM

## 2022-01-20 DIAGNOSIS — M16.12 PRIMARY OSTEOARTHRITIS OF LEFT HIP: ICD-10-CM

## 2022-01-20 DIAGNOSIS — M25.551 RIGHT HIP PAIN: Primary | ICD-10-CM

## 2022-01-20 DIAGNOSIS — M87.00 AVN (AVASCULAR NECROSIS OF BONE) (HCC): ICD-10-CM

## 2022-01-20 PROCEDURE — 99214 OFFICE O/P EST MOD 30 MIN: CPT | Performed by: ORTHOPAEDIC SURGERY

## 2022-01-20 NOTE — PROGRESS NOTES
Dr Mary Capellan      Date /Time 1/20/2022       10:59 AM EST  Name Lynette Parks.             1985   Location  [facility]  MRN <[id number]>                Chief Complaint   Patient presents with    Hip Pain     MELLISA HIP         History of Present Illness    Lynette Parks. is a 29 y.o. male who presents with  bilateral hip pain. Sent in consultation by LYDIA Cuenca,. Injury Mechanism:  none. Worker's Comp. & legal issues:   none. Previous Treatments: Ice, Heat and NSAIDs    Patient presents the office today for a new problem. Patient is here with left greater than right hip pain. Patient has had pain off and on for at least a year. Increased pain with activities and improvement with rest.  Pain concentrated groin and lateral hip. Patient does have a history of right hip [place] these disease as a child which did require surgery. Past History  Past Medical History:   Diagnosis Date    Arthritis     H/O degenerative disc disease     Hypertension     Osteoarthritis      Past Surgical History:   Procedure Laterality Date    HIP SURGERY       Family History   Problem Relation Age of Onset    Diabetes Mother     High Cholesterol Mother     High Blood Pressure Mother     High Blood Pressure Father     High Cholesterol Father     Cancer Father         skin cancer    Heart Attack Maternal Aunt     Cancer Maternal Aunt         stomach cancer    Heart Attack Maternal Uncle     Alcohol Abuse Paternal Uncle     Stroke Maternal Grandfather     Heart Attack Maternal Grandfather      Social History     Tobacco Use    Smoking status: Current Every Day Smoker     Packs/day: 0.25     Types: Cigarettes    Smokeless tobacco: Current User     Types: Snuff   Substance Use Topics    Alcohol use:  Yes     Alcohol/week: 30.0 standard drinks     Types: 30 Cans of beer per week     Comment: daily      Current Outpatient Medications on File Prior to Visit Medication Sig Dispense Refill    hydroCHLOROthiazide (HYDRODIURIL) 25 MG tablet Take 1 tablet by mouth every morning 30 tablet 0    famotidine (PEPCID) 10 MG tablet Take 10 mg by mouth 2 times daily as needed       No current facility-administered medications on file prior to visit. ASCVD 10-YEAR RISK SCORE  The ASCVD Risk score (Bela Brewer., et al., 2013) failed to calculate for the following reasons: The 2013 ASCVD risk score is only valid for ages 36 to 78   . Review of Systems  10-point ROS is negative other than HPI. Physical Exam  Based off 1997 Exam Criteria  Ht 5' 7\" (1.702 m)   Wt 220 lb (99.8 kg)   BMI 34.46 kg/m²      Constitutional:       General: He is not in acute distress. Appearance: Normal appearance. Cardiovascular:      Rate and Rhythm: Normal rate and regular rhythm. Pulses: Normal pulses. Pulmonary:      Effort: Pulmonary effort is normal. No respiratory distress. Neurological:      Mental Status: He is alert and oriented to person, place, and time. Mental status is at baseline.      Musculoskeletal:  Gait:  antalgic    Spine / Hip Exam:      RIGHT  LEFT    Lumbar Spine Exam  [x] All Neg    [x] All Neg     Straight leg raise  []  []Not tested   []  []Not tested    Clonus  []  []Not tested   []  []Not tested    Pain with motion  []  []Not tested   []  []Not tested    Radiculopathy  []  []Not tested   []  []Not tested    Paraspinal muscle tenderness  [] Paraspinal  []Midline   [] Paraspinal  []Midline   Sensation RIGHT  LEFT    L3  [x] Normal []Decreased    [x] Normal []Decreased   L4  [x] Normal  []Decreased   [x] Normal []Decreased   L5  [x] Normal []Decreased   [x] Normal []Decreased   S1  [x] Normal  []Decreased   [x] Normal []Decreased   Pelvis       Scoliosis  [x] Nml  [] Present     Leg-length discrepency  [x] Equal  [] Right longer   [] Left longer   Range of Motion Active Passive Active Passive   Hip Flexion 100  100    Abduction 30  30    External Rotation @ 90 flex 25  25    Internal Rotation @ 90 flex 10  10           Hip Impingement / Dysplasia  [] All Neg  [] Not tested   [] All Neg  [] Not tested    Hip impingement test  [x]  []Not tested   [x]  []Not tested    C-sign  [x]  []Not tested   [x]  []Not tested    Anterior instability apprehension  []  []Not tested   []  []Not tested    Posterior instability apprehension  []  []Not tested   []  []Not tested    Uncontained Internal rotation  []  []Not tested  []  []Not tested          Abductors  [x] All Neg  [] Not tested   [x] All Neg  [] Not tested    Medius strength  []  []Not tested   []  []Not tested    Minimum strength  []  []Not tested   []  []Not tested    IT band tendonitis  []  []Not tested   []  []Not tested    Trochanteric tenderness  []  []Not tested  []  []Not tested   Sciatic neuropathic pain  []  []Not tested   []  []Not tested           Post-arthroplasty  [] All Neg  [] Not tested   [] All Neg  [] Not tested    Rectus tendonitis  []  []Not tested   []  []Not tested    Iliopsoas tendonitis       Start-up pain  []  []Not tested   []  []Not tested          Imaging    Bilateral Hip: [facility]  Radiographs: X-rays were ordered today and reviewed of both the right and left hips. 3 views. AP pelvis, lateral, and false profile. They demonstrate no evidence of fractures or dislocations. Severe osteoarthritis with AVN left hip. 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 Assessment and Plan  Kristopher Montoya was seen today for hip pain. Diagnoses and all orders for this visit:    Right hip pain  -     Cancel: XR HIP RIGHT (2-3 VIEWS); Future    Femoral acetabular impingement    Primary osteoarthritis of left hip    AVN (avascular necrosis of bone) ([address]        Patient is currently scheduled for an appointment with vascular surgeon.   Vascular surgeon to determine if lower extremity vascular flow appropriate. Patient will follow-up in the office after he sees his vascular physician. Consideration for left anterior total hip arthroplasty at that time. I discussed with Maribeth Coleman. that his history, symptoms, signs and imaging are most consistent with hip arthritis    We reviewed the natural history of these conditions and treatment options ranging from conservative measures (rest, icing, activity modification, physical therapy, pain meds, cortisone injection) to surgical options. In terms of treatment, I recommended continuing with rest, icing, avoidance of painful activities, NSAIDs or pain meds as tolerated, and physical therapy. We discussed surgical options as well, should conservative measures fail. Electronically signed by Gladis Mcrae MD on 1/20/2022 at 10:59 AM  This dictation was generated by voice recognition computer software. Although all attempts are made to edit the dictation for accuracy, there may be errors in the transcription that are not intended.

## 2022-01-21 ENCOUNTER — OFFICE VISIT (OUTPATIENT)
Dept: FAMILY MEDICINE CLINIC | Age: 37
End: 2022-01-21
Payer: COMMERCIAL

## 2022-01-21 ENCOUNTER — APPOINTMENT (OUTPATIENT)
Dept: GENERAL RADIOLOGY | Age: 37
End: 2022-01-21
Payer: COMMERCIAL

## 2022-01-21 ENCOUNTER — HOSPITAL ENCOUNTER (EMERGENCY)
Age: 37
Discharge: HOME | End: 2022-01-21
Attending: STUDENT IN AN ORGANIZED HEALTH CARE EDUCATION/TRAINING PROGRAM
Payer: COMMERCIAL

## 2022-01-21 VITALS
RESPIRATION RATE: 18 BRPM | WEIGHT: 221 LBS | TEMPERATURE: 98.1 F | BODY MASS INDEX: 34.61 KG/M2 | DIASTOLIC BLOOD PRESSURE: 96 MMHG | HEART RATE: 78 BPM | SYSTOLIC BLOOD PRESSURE: 189 MMHG | OXYGEN SATURATION: 98 %

## 2022-01-21 VITALS
TEMPERATURE: 98.1 F | OXYGEN SATURATION: 97 % | DIASTOLIC BLOOD PRESSURE: 94 MMHG | HEART RATE: 66 BPM | HEIGHT: 66 IN | WEIGHT: 232 LBS | BODY MASS INDEX: 37.28 KG/M2 | SYSTOLIC BLOOD PRESSURE: 162 MMHG

## 2022-01-21 DIAGNOSIS — I10 ESSENTIAL HYPERTENSION: Primary | ICD-10-CM

## 2022-01-21 DIAGNOSIS — Z53.20 PATIENT LEFT BEFORE TREATMENT COMPLETED: Primary | ICD-10-CM

## 2022-01-21 DIAGNOSIS — H61.21 IMPACTED CERUMEN OF RIGHT EAR: ICD-10-CM

## 2022-01-21 PROCEDURE — 71046 X-RAY EXAM CHEST 2 VIEWS: CPT

## 2022-01-21 PROCEDURE — 99213 OFFICE O/P EST LOW 20 MIN: CPT | Performed by: NURSE PRACTITIONER

## 2022-01-21 PROCEDURE — 99283 EMERGENCY DEPT VISIT LOW MDM: CPT

## 2022-01-21 RX ORDER — LISINOPRIL 10 MG/1
10 TABLET ORAL DAILY
Qty: 30 TABLET | Refills: 0 | Status: SHIPPED | OUTPATIENT
Start: 2022-01-21 | End: 2022-02-11 | Stop reason: SDUPTHER

## 2022-01-21 ASSESSMENT — ENCOUNTER SYMPTOMS
SHORTNESS OF BREATH: 0
CONSTIPATION: 0
RHINORRHEA: 0
ABDOMINAL PAIN: 1
DIARRHEA: 0
COLOR CHANGE: 0
NAUSEA: 0
CHEST TIGHTNESS: 0
ABDOMINAL PAIN: 0
VOMITING: 0
SHORTNESS OF BREATH: 0
SORE THROAT: 0
ABDOMINAL DISTENTION: 0
SORE THROAT: 0
WHEEZING: 0
COUGH: 0

## 2022-01-21 ASSESSMENT — PAIN SCALES - GENERAL: PAINLEVEL_OUTOF10: 4

## 2022-01-21 NOTE — PROGRESS NOTES
2022  Katheryn (: 1985)  29 y.o.    ASSESSMENT and PLAN:  Joon Montejo was seen today for hypertension. Diagnoses and all orders for this visit:    Essential hypertension  -     lisinopril (PRINIVIL;ZESTRIL) 10 MG tablet; Take 1 tablet by mouth daily  -     BASIC METABOLIC PANEL; Future  -Add Lisinopril 10 mg daily. Had tolerated Lisinopril well in the past.  -Reviewed side effects  -Recheck renal function, and potassium in 1-2 wks. -F/u appt with pcp on .  -Educated on s/sx of low bp.   -Keep log at home of bp's, bring new bp monitor to next visit. -Reduce salt in diet, and increase regular exercise as tolerated. Right Ear-Cerumen Impaction  -Irrigated with water in office with symptom relief  -Would consider ENT referral or regular ear cleanings due increased wax production.  -Recommend trial of flonase daily     Return in about 4 weeks (around 2022), or if symptoms worsen or fail to improve. HPI  Presenting with hypertension. Checks at daily  Was in ER 3 days ago and was put on HCTZ 25mg for hypertensive event. Tolerating well. BP is improving, but still high. Taking medication daily, no missed doses. Feels like he has had generalized edema recently, that has improved in the last 3 days. Denies diet change, weight change, or extremal stressors. Current smoker, 1-2 packs per week. 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 Had tolerated Lisinopril 20 mg well before, will restart at 10 mg. Was taken off due to bp being well controlled in 2019. Denies headaches, vision changes. Sob, cp, chest tightness, worsening edema. Has c/o tinnitus on right ear, and feels like ear pops frequently. Hx of cerumen impactions. Denies drainage or pain. Sometimes feels like he has vertigo or that room spins, occurs randomly. Not persistent. No n/v. Denies any falls.        Review of Systems   Constitutional: Negative for activity change, appetite change, chills, diaphoresis, fatigue, fever and unexpected weight change. HENT: Positive for tinnitus (right ear). Negative for congestion, sneezing and sore throat. Respiratory: Negative for cough, chest tightness, shortness of breath and wheezing. Cardiovascular: Negative for chest pain, palpitations and leg swelling. Gastrointestinal: Negative for abdominal distention, abdominal pain, constipation, diarrhea, nausea and vomiting. Genitourinary: Negative. Musculoskeletal: Negative. Skin: Negative. Neurological: Negative for dizziness, weakness, light-headedness, numbness and headaches. Hematological: Negative. Psychiatric/Behavioral: Negative. Allergies, past medical history, family history, and social history reviewed and unchanged from previous encounter. Current Outpatient Medications   Medication Sig Dispense Refill    lisinopril (PRINIVIL;ZESTRIL) 10 MG tablet Take 1 tablet by mouth daily 30 tablet 0    hydroCHLOROthiazide (HYDRODIURIL) 25 MG tablet Take 1 tablet by mouth every morning 30 tablet 0    famotidine (PEPCID) 10 MG tablet Take 10 mg by mouth 2 times daily as needed       No current facility-administered medications for this visit. Vitals:    01/21/22 1627   BP: (!) 162/94   Site: Right Upper Arm   Position: Sitting   Cuff Size: Large Adult   Pulse: 66   Temp: 98.1 °F (36.7 °C)   TempSrc: Oral   SpO2: 97%   Weight: 232 lb (105.2 kg)   Height: 5' 6\" (1.676 m)     Estimated body mass index is 37.45 kg/m² as calculated from the following:    Height as of this encounter: 5' 6\" (1.676 m). Weight as of this encounter: 232 lb (105.2 kg). Physical Exam  Vitals reviewed. Constitutional:       Appearance: Normal appearance. He is normal weight. HENT:      Head: Normocephalic and atraumatic. Right Ear: There is impacted cerumen.       Left Ear: Tympanic membrane, ear canal and external ear normal.      Nose: Nose normal.   Eyes:      Conjunctiva/sclera: Conjunctivae normal.   Cardiovascular:      Rate and Rhythm: Normal rate and regular rhythm. Pulses: Normal pulses. Heart sounds: Normal heart sounds. Pulmonary:      Effort: Pulmonary effort is normal. No respiratory distress. Breath sounds: Normal breath sounds. No wheezing or rhonchi. Chest:      Chest wall: No tenderness. Abdominal:      General: Abdomen is flat. Bowel sounds are normal.      Palpations: Abdomen is soft. Tenderness: There is no abdominal tenderness. Musculoskeletal:         General: Swelling (generalized) present. Normal range of motion. Cervical back: Normal range of motion and neck supple. Right lower leg: Edema (trace) present. Left lower leg: Edema (trace) present. Skin:     General: Skin is warm and dry. Capillary Refill: Capillary refill takes less than 2 seconds. Neurological:      General: No focal deficit present. Mental Status: He is alert and oriented to person, place, and time. Mental status is at baseline. Psychiatric:         Mood and Affect: Mood normal.         Behavior: Behavior normal.         Thought Content:  Thought content normal.         Judgment: Judgment normal.

## 2022-01-21 NOTE — ED PROVIDER NOTES
Evaluated by Advanced Practice Provider          [facility]  EMERGENCY DEPARTMENT ENCOUNTER        Pt Name: Alvaro Isabel MRN: [id number]  Birthdate 1985  Dateof evaluation: 1/21/2022  Provider: DEREK Vázquez - SHAWN  PCP: Sabina Mccormack, [address]  ED Attending: No att. providers found    [facility]       Chief Complaint   Patient presents with    Hypertension     pt c/o L hand numbness starting a few hours ago. no H/A. speech clear.  equal. gait steady. seen at other ED for same a few days ago. started taking RX HCTZ w water relief but no change in B/P. pending PCP appt feb. HISTORY OF PRESENTILLNESS   (Location/Symptom, Timing/Onset, Context/Setting, Quality, Duration, Modifying Factors, Severity)  Note limiting factors. Alvaro Isabel is a 29 y.o. male for left arm numbness and elevated blood pressure. Onset was 5 days. Context includes patient reports that he started having left hand numbness a few hours ago today. He reports he has a history of hypertension but has not taken any of his blood pressure medications in a while. He reports he was seen a few days ago for the same symptoms and was started on hydrochlorothiazide. Patient reports that he is scheduled to see a primary care doctor in the next couple of weeks. Patient reports that he did have some epigastric discomfort. Patient denies any chest pain shortness of breath or urinary issues. Alleviating factors include nothing. Aggravating factors include nothing. Pain is 0/10. Nothing has been used for pain today. Nursing Notes were all reviewed and agreed with or any disagreements were addressed  in the HPI. REVIEW OF SYSTEMS    (2-9 systems for level 4, 10 or more for level 5)     Review of Systems   Constitutional: Negative for fever. HENT: Negative for congestion, rhinorrhea and sore throat. Respiratory: Negative for shortness of breath.     Cardiovascular: Snuff   Vaping Use    Vaping Use: Never used   Substance and Sexual Activity    Alcohol use: Yes     Alcohol/week: 30.0 standard drinks     Types: 30 Cans of beer per week     Comment: daily    Drug use: Yes     Types: Marijuana Olene Melisa)    Sexual activity: Yes     Partners: Female   Other Topics Concern    None   Social History Narrative    None     Social Determinants of Health     Financial Resource Strain: Low Risk     Difficulty of Paying Living Expenses: Not hard at all   Food Insecurity: No Food Insecurity    Worried About Running Out of Food in the Last Year: Never true    [address] in the Last Year: Never true   Transportation Needs:     Lack of Transportation (Medical): Not on file    Lack of Transportation (Non-Medical):  Not on file   Physical Activity:     Days of Exercise per Week: Not on file    Minutes of Exercise per Session: Not on file   Stress:     Feeling of Stress : Not on file   Social Connections:     Frequency of Communication with Friends and Family: Not on file    Frequency of Social Gatherings with Friends and Family: Not on file    Attends Alevism Services: Not on file    Active Member of [address] [place] [address] or Organizations: Not on file    Attends Club or Organization Meetings: Not on file    Marital Status: Not on file   Intimate Partner Violence:     Fear of Current or Ex-Partner: Not on file    Emotionally Abused: Not on file    Physically Abused: Not on file    Sexually Abused: Not on file   Housing Stability:     Unable to Pay for Housing in the Last Year: Not on file    Number of [place] in the Last Year: Not on file    Unstable Housing in the Last Year: Not on file       SCREENINGS             PHYSICAL EXAM  (up to 7 for level 4, 8 or more for level 5)     ED Triage Vitals [01/21/22 1142]   BP Temp Temp src Pulse Resp SpO2 Height Weight   (!) 189/96 98.1 °F (36.7 °C) -- 78 18 98 % -- 221 lb (100.2 kg)       Physical Exam  Constitutional:       Appearance: He is well-developed. He is obese. HENT:      Head: Normocephalic and atraumatic. Cardiovascular:      Rate and Rhythm: Normal rate. Pulmonary:      Effort: Pulmonary effort is normal. No respiratory distress. Abdominal:      General: There is no distension. Palpations: Abdomen is soft. Tenderness: There is abdominal tenderness in the epigastric area. Musculoskeletal:         General: Normal range of motion. Cervical back: Normal range of motion. Skin:     General: Skin is warm and dry. Neurological:      General: No focal deficit present. Mental Status: He is alert and oriented to person, place, and time. DIAGNOSTIC RESULTS   LABS:    Labs Reviewed   CBC WITH AUTO DIFFERENTIAL   COMPREHENSIVE METABOLIC PANEL W/ REFLEX TO MG FOR LOW K   URINE RT REFLEX TO CULTURE   TROPONIN   BRAIN NATRIURETIC PEPTIDE   D-DIMER, QUANTITATIVE       All other labs werewithin normal range or not returned as of this dictation. EKG: All EKG's are interpreted by the Emergency Department Physician who either signs or Co-signs this chart in the absence of a cardiologist.  Please see their note for interpretation of EKG. RADIOLOGY:           Interpretation per the Radiologist below, if available at the time of this note:    XR CHEST (2 VW)    (Results Pending)     No results found. PROCEDURES   Unless otherwise noted below, none     Procedures     CRITICAL CARE TIME   N/A    CONSULTS:  None      EMERGENCYDEPARTMENT COURSE and DIFFERENTIAL DIAGNOSIS/MDM:   Vitals:    Vitals:    01/21/22 1142   BP: (!) 189/96   Pulse: 78   Resp: 18   Temp: 98.1 °F (36.7 °C)   SpO2: 98%   Weight: 221 lb (100.2 kg)       Patient was given the following medications:  Medications - No data to display    Patient was seen and evaluated by myself. Patient here for concerns for elevated blood pressure. Patient reports that he had been taking blood pressure medications at 1 time however is since not taking any. He reports that he started having some tingling to his arm and weird sensation. He reports that he was seen a few days ago in the ED for the same symptoms and was started on hydrochlorothiazide. Patient presents today for numbness and concerns for blood pressure. Lab values were ordered however the patient called his primary care doctor while in the ED and they have agreed to call in blood pressure medications for him. Patient elected to leave the department prior to receiving additional treatment. The patient tolerated their visit well. I have evaluated this patient. My supervising physician was available for consultation. The patient and / or the family were informed of the results of any tests, a time was given to answer questions, a plan was proposed and they agreed with plan. FINAL IMPRESSION      1. Patient left before treatment completed          DISPOSITION/PLAN   DISPOSITION Eloped - Left Before Treatment Complete 01/21/2022 12:30:43 PM      PATIENT REFERRED TO:  No follow-up provider specified.     DISCHARGE MEDICATIONS:  New Prescriptions    No medications on file       DISCONTINUED MEDICATIONS:  Discontinued Medications    No medications on file              (Please note that portions of this note were completed with a voice recognition program.  Efforts were made to edit the dictations but occasionally words are mis-transcribed.)    DEREK Mazariegos CNP (electronically signed)         DEREK Mazariegos CNP  01/21/22 3274

## 2022-01-28 ENCOUNTER — OFFICE VISIT (OUTPATIENT)
Dept: VASCULAR SURGERY | Age: 37
End: 2022-01-28
Payer: COMMERCIAL

## 2022-01-28 VITALS
BODY MASS INDEX: 35.79 KG/M2 | HEIGHT: 67 IN | SYSTOLIC BLOOD PRESSURE: 118 MMHG | DIASTOLIC BLOOD PRESSURE: 80 MMHG | WEIGHT: 228 LBS

## 2022-01-28 DIAGNOSIS — R60.1 GENERALIZED EDEMA: ICD-10-CM

## 2022-01-28 DIAGNOSIS — I10 ESSENTIAL HYPERTENSION: Primary | ICD-10-CM

## 2022-01-28 PROCEDURE — 99203 OFFICE O/P NEW LOW 30 MIN: CPT | Performed by: SURGERY

## 2022-01-28 NOTE — PROGRESS NOTES
Outpatient Consultation / H&P    Date of Consultation:  1/28/2022    PCP:  LYDIA Arnett     Referring Provider:  Dr. Ada Andrade     Chief Complaint:   Chief Complaint   Patient presents with    Other     patient ref by dermatologist for edema. pamlr        History of Present Illness: We are asked to see this patient in consultation by Dr. Ada Andrade regarding edema. Zoila Blanco is a 29 y.o. male who reports diffuse swelling of hands and legs. He also noted some red \"blotchy\" areas. He also reports hypertension which has improved with starting HCTZ. Edema has improved significantly as well. He has no symptoms of claudication. He had seen Rheumatology in past but no diagnosis made. Venous duplex of lower extremities performed in past without evidence of thombi. Past Medical History:  Past Medical History:   Diagnosis Date    Arthritis     H/O degenerative disc disease     Hypertension     Osteoarthritis        Past Surgical History:  Past Surgical History:   Procedure Laterality Date    HIP SURGERY         Home Medications:   Prior to Admission medications    Medication Sig Start Date End Date Taking?  Authorizing Provider   lisinopril (PRINIVIL;ZESTRIL) 10 MG tablet Take 1 tablet by mouth daily 1/21/22  Yes DEREK Moore CNP   hydroCHLOROthiazide (HYDRODIURIL) 25 MG tablet Take 1 tablet by mouth every morning 1/17/22  Yes Anna Ascencio MD   famotidine (PEPCID) 10 MG tablet Take 10 mg by mouth 2 times daily as needed   Yes Historical Provider, MD        Allergies:  Cefepime      Social History:      Social History     Socioeconomic History    Marital status:      Spouse name: Not on file    Number of children: Not on file    Years of education: Not on file    Highest education level: Not on file   Occupational History    Not on file   Tobacco Use    Smoking status: Current Every Day Smoker     Packs/day: 0.25     Types: Cigarettes    Smokeless tobacco: Current User     Types: Snuff   Vaping Use    Vaping Use: Never used   Substance and Sexual Activity    Alcohol use: Yes     Alcohol/week: 30.0 standard drinks     Types: 30 Cans of beer per week     Comment: daily    Drug use: Yes     Types: Marijuana Alderson Priscilla)    Sexual activity: Yes     Partners: Female   Other Topics Concern    Not on file   Social History Narrative    Not on file     Social Determinants of Health     Financial Resource Strain: Low Risk     Difficulty of Paying Living Expenses: Not hard at all   Food Insecurity: No Food Insecurity    Worried About [facility] in the Last Year: Never true    [facility] in the Last Year: Never true   Transportation Needs:     Lack of Transportation (Medical): Not on file    Lack of Transportation (Non-Medical):  Not on file   Physical Activity:     Days of Exercise per Week: Not on file    Minutes of Exercise per Session: Not on file   Stress:     Feeling of Stress : Not on file   Social Connections:     Frequency of Communication with Friends and Family: Not on file    Frequency of Social Gatherings with Friends and Family: Not on file    Attends Confucianism Services: Not on file    Active Member of [address] [place] [address] or Organizations: Not on file    Attends Club or Organization Meetings: Not on file    Marital Status: Not on file   Intimate Partner Violence:     Fear of Current or Ex-Partner: Not on file    Emotionally Abused: Not on file    Physically Abused: Not on file    Sexually Abused: Not on file   Housing Stability:     Unable to Pay for Housing in the Last Year: Not on file    Number of [place] in the Last Year: Not on file    Unstable Housing in the Last Year: Not on file       Family History:        Problem Relation Age of Onset    Diabetes Mother     High Cholesterol Mother     High Blood Pressure Mother     High Blood Pressure Father     High Cholesterol Father     Cancer Father         skin cancer    Heart Attack Maternal Aunt     Cancer Maternal Aunt         stomach cancer    Heart Attack Maternal Uncle     Alcohol Abuse Paternal Uncle     Stroke Maternal Grandfather     Heart Attack Maternal Grandfather        Review of Systems:  A 14 point review of systems was completed. Pertinent positives identified in the HPI, all other review of systems negative. Physical Examination:    /80 (Site: Left Upper Arm)   Ht 5' 7\" (1.702 m)   Wt 228 lb (103.4 kg)   BMI 35.71 kg/m²     Weight: 228 lb (103.4 kg)       General appearance: NAD  Eyes: PERRLA  Neck: no JVD, no lymphadenopathy. Respiratory: effort is unlabored, no crackles, wheezes or rubs. Cardiovascular: regular, no murmur. No carotid bruits. Pulses:    radial femoral PT   RIGHT 2 2 2   LEFT 2 2 2   GI: abdomen soft, nondistended, no organomegaly. Musculoskeletal: strength and tone normal.  Extremities: warm and pink. No significant edema at this time. No varicosities. Skin: no dermatitis or ulceration. Neuro/psychiatric: grossly intact. Assessment:      Diagnosis Orders   1. Essential hypertension     2. Generalized edema       Both of the above improved after HCTZ. No evidence of arterial occlusive disease or stigmata of venous disease. Recommendations/Plan:    No further Vascular testing indicated. Consider Rheumatology re evaluation.         Sade Camacho MD, FACS

## 2022-02-11 ENCOUNTER — OFFICE VISIT (OUTPATIENT)
Dept: FAMILY MEDICINE CLINIC | Age: 37
End: 2022-02-11
Payer: COMMERCIAL

## 2022-02-11 VITALS
SYSTOLIC BLOOD PRESSURE: 112 MMHG | DIASTOLIC BLOOD PRESSURE: 70 MMHG | WEIGHT: 231 LBS | BODY MASS INDEX: 36.18 KG/M2 | HEART RATE: 64 BPM | OXYGEN SATURATION: 99 %

## 2022-02-11 DIAGNOSIS — I10 ESSENTIAL HYPERTENSION: Primary | ICD-10-CM

## 2022-02-11 DIAGNOSIS — A23.9 MEDITERRANEAN FEVER: ICD-10-CM

## 2022-02-11 DIAGNOSIS — I10 ESSENTIAL HYPERTENSION: ICD-10-CM

## 2022-02-11 DIAGNOSIS — R60.0 LOWER EXTREMITY EDEMA: ICD-10-CM

## 2022-02-11 LAB
ANION GAP SERPL CALCULATED.3IONS-SCNC: 15 MMOL/L (ref 3–16)
BUN BLDV-MCNC: 15 MG/DL (ref 7–20)
CALCIUM SERPL-MCNC: 9.4 MG/DL (ref 8.3–10.6)
CHLORIDE BLD-SCNC: 99 MMOL/L (ref 99–110)
CO2: 23 MMOL/L (ref 21–32)
CREAT SERPL-MCNC: 0.8 MG/DL (ref 0.9–1.3)
GFR AFRICAN AMERICAN: >60
GFR NON-AFRICAN AMERICAN: >60
GLUCOSE BLD-MCNC: 119 MG/DL (ref 70–99)
POTASSIUM SERPL-SCNC: 4.3 MMOL/L (ref 3.5–5.1)
SODIUM BLD-SCNC: 137 MMOL/L (ref 136–145)

## 2022-02-11 PROCEDURE — 99213 OFFICE O/P EST LOW 20 MIN: CPT | Performed by: PHYSICIAN ASSISTANT

## 2022-02-11 RX ORDER — LISINOPRIL 10 MG/1
10 TABLET ORAL DAILY
Qty: 90 TABLET | Refills: 0 | Status: SHIPPED | OUTPATIENT
Start: 2022-02-11 | End: 2022-05-10 | Stop reason: SDUPTHER

## 2022-02-11 RX ORDER — HYDROCHLOROTHIAZIDE 25 MG/1
25 TABLET ORAL EVERY MORNING
Qty: 90 TABLET | Refills: 0 | Status: SHIPPED | OUTPATIENT
Start: 2022-02-11 | End: 2022-05-10 | Stop reason: SDUPTHER

## 2022-02-11 ASSESSMENT — ENCOUNTER SYMPTOMS
CHEST TIGHTNESS: 0
SHORTNESS OF BREATH: 0

## 2022-02-11 NOTE — PROGRESS NOTES
Hailee Reynoso (:  1985) is a 29 y.o. male,Established patient, here for evaluation of the following chief complaint(s):  Hypertension (follow up ) and Rash (follow up from dermatology appointment )         ASSESSMENT/PLAN:  1. Essential hypertension  -     lisinopril (PRINIVIL;ZESTRIL) 10 MG tablet; Take 1 tablet by mouth daily, Disp-90 tablet, R-0Normal  -     Stable, well controlled, continue with current medication, follow up in one month  2. Mediterranean fever  -     [facility]  -     [facility] to rule this in or out, dermatology to take biopsy if needed  3. Lower extremity edema  -     hydroCHLOROthiazide (HYDRODIURIL) 25 MG tablet; Take 1 tablet by mouth every morning, Disp-90 tablet, R-0Normal        -    Greatly improved, continue with current medication    Return in about 6 months (around 2022) for HTN. Subjective   SUBJECTIVE/OBJECTIVE:  HPI  Lower extremity edema:  Started on hctz  Tolerating well without side effects  Swelling improved  Has been seen and cleared by vascular    Recurrent erythema of legs  Suggested by dermatology that this may be related to Mediterranean Fever  Pt needing genetic testing  Last episode one week ago    HTN:  Current medication: lisinopril  Side effects: none  Home blood pressures: does not check        Review of Systems   Constitutional: Negative for unexpected weight change. Respiratory: Negative for chest tightness and shortness of breath. Cardiovascular: Positive for leg swelling. Negative for chest pain and palpitations. Neurological: Negative for dizziness and headaches. Objective   Physical Exam  Vitals reviewed. Constitutional:       Appearance: Normal appearance. He is obese. Cardiovascular:      Rate and Rhythm: Normal rate and regular rhythm. Heart sounds: Normal heart sounds.    Pulmonary:      Effort: Pulmonary effort is normal.      Breath sounds: Normal breath sounds. Musculoskeletal:      Right lower leg: Edema present. Left lower leg: Edema present. Skin:     Findings: No erythema. Neurological:      Mental Status: He is alert. An electronic signature was used to authenticate this note.     --LYDIA Al

## 2022-02-17 ENCOUNTER — OFFICE VISIT (OUTPATIENT)
Dept: ORTHOPEDIC SURGERY | Age: 37
End: 2022-02-17
Payer: COMMERCIAL

## 2022-02-17 VITALS — WEIGHT: 233 LBS | HEIGHT: 67 IN | BODY MASS INDEX: 36.57 KG/M2

## 2022-02-17 DIAGNOSIS — M25.552 HIP PAIN, LEFT: ICD-10-CM

## 2022-02-17 DIAGNOSIS — M87.00 AVN (AVASCULAR NECROSIS OF BONE) (HCC): ICD-10-CM

## 2022-02-17 DIAGNOSIS — M16.12 PRIMARY OSTEOARTHRITIS OF LEFT HIP: Primary | ICD-10-CM

## 2022-02-17 PROCEDURE — 99215 OFFICE O/P EST HI 40 MIN: CPT | Performed by: ORTHOPAEDIC SURGERY

## 2022-02-17 NOTE — LETTER
[facility]  Surgery Scheduling Form:    22     DEMOGRAPHICS    Patient Name:  Azucena Louis Patient :  1985   Patient SS#:  [id number]    Patient Phone:  [phone] (home) [phone] (work) Alt. Patient Phone:    Patient Address:  [address]    PCP:  LYDIA Melgar  Insurance:  Payor: Sisto Booze / Plan: BCBS - [place] PPO / Product Type: *No Product type* /   Insurance ID Number:  Payor/Plan Subscr  Sex Relation Sub. Ins. ID Effective Group Num   1.  [address] -* SOLEDAD VÁZQUEZ* 1985 Male Self [id number]                                   [id number]       DIAGNOSIS & PROCEDURE    Diagnosis: LEFT  M16.12 Left hip primary osteoarthritis    Operation: LEFT  [address] Total Hip Arthroplasty Minimally-Invasive Direct Anterior     Provider:  Malena Sharp MD    Location:  [facility] INFORMATION    Requested Date:  2022  Requested Time: 8:30 am        Patient Arrival Time:  6:30 am   OR Time Required:  100 Minutes  Admission:  []Outpatient   []23 hour  [x]Same Day Admit:   1-2  days  []Inpatient    Anesthesia:  [x]General  []Spinal  []MAC/Sedation  Regional Anesthesia:  []None  []OrthoMix  []Exparel  [x]Fascia [place] / PENG       EQUIPMENT    Position:  [x]Supine  []Lateral  []Beach-chair  []Prone    OR Bed:  []Regular  [x][place]  []Servando  []Hip franco  []Beach-chair  []Spyder  Radiology:  [x]Large C-arm  []Small C-arm  []Portable X-ray    Implants:  [employer] Hip:  [x]Primary Set  []Revision Set  Medacta Hip:  []Dual-modular acetabulum (DM)    SUTURE: []#5 Ethibond  [x]#2 Ethibond  [x]#2 Quill  []#1 PDS  [x]#1 Vicryl                   [x]2-0 Vicryl  [x]3-0 Monocryl  []2-0 Nylon  []3-0 Nylon  []3-0 PDS                    []Dermabond  []Steri-strips (in half)  DRESSING:  [x]Prineo dermabond  []4x4 gauze  [x]ABDs  [x]Tegaderm  BRACE: []Pelvic Binder  []Hip X-ACT  []Knee TROM  []Knee immobilizer []Shoulder Immob. (w/abd. pillow)  []Sling  []Ice Unit  []Ace-Wrap      [x]Samreen Biomet:  Gennaro Peterson [phone], Andi Campbell. [email or web address]  []Medacta: Clarence Mendoza [phone], [email or web address]  []Fx Shoulder: Mahi Whitten [phone], Isaias Ferreira. [email or web address]  [][place]: Thor Scott [phone], Suze Marroquin. [email or web address]    Comments:        Melisa Mnoae MD  [facility] [employer] [facility]  2/17/2022       8:57 AM EST

## 2022-02-17 NOTE — PROGRESS NOTES
Dr Cindi Borjas      Date /Time 2/17/2022       10:59 AM EST  Name Marck Merida.             1985   Location  [facility]  MRN <[id number]>                Chief Complaint   Patient presents with    Follow-up     Bilateral Hips L>R        History of Present Illness    Marck Tran is a 29 y.o. male who presents with  bilateral hip pain. Injury Mechanism:  none. Worker's Comp. & legal issues:   none. Previous Treatments: Ice, Heat and NSAIDs    Patient presents to the office today for follow-up visit. Patient is here with a chief complaint of left greater than right hip pain. Patient did have a vascular consultation since his last visit. Specifically there is no evidence of arterial occlusion or venous disease. He is here with continued hip pain. He would like to discuss total hip arthroplasty. Further history as below    Previous history: Patient presents the office today for a new problem. Patient is here with left greater than right hip pain. Patient has had pain off and on for at least a year. Increased pain with activities and improvement with rest.  Pain concentrated groin and lateral hip. Patient does have a history of right hip [place] these disease as a child which did require surgery.     Past History  Past Medical History:   Diagnosis Date    Arthritis     H/O degenerative disc disease     Hypertension     Osteoarthritis      Past Surgical History:   Procedure Laterality Date    HIP SURGERY       Family History   Problem Relation Age of Onset    Diabetes Mother     High Cholesterol Mother     High Blood Pressure Mother     High Blood Pressure Father     High Cholesterol Father     Cancer Father         skin cancer    Heart Attack Maternal Aunt     Cancer Maternal Aunt         stomach cancer    Heart Attack Maternal Uncle     Alcohol Abuse Paternal Uncle     Stroke Maternal Grandfather     Heart Attack Maternal Grandfather      Social History     Tobacco Use    Smoking status: Current Every Day Smoker     Packs/day: 0.25     Types: Cigarettes    Smokeless tobacco: Current User     Types: Snuff   Substance Use Topics    Alcohol use: Yes     Alcohol/week: 30.0 standard drinks     Types: 30 Cans of beer per week     Comment: daily      Current Outpatient Medications on File Prior to Visit   Medication Sig Dispense Refill    hydroCHLOROthiazide (HYDRODIURIL) 25 MG tablet Take 1 tablet by mouth every morning 90 tablet 0    lisinopril (PRINIVIL;ZESTRIL) 10 MG tablet Take 1 tablet by mouth daily 90 tablet 0    famotidine (PEPCID) 10 MG tablet Take 10 mg by mouth 2 times daily as needed       No current facility-administered medications on file prior to visit. ASCVD 10-YEAR RISK SCORE  The ASCVD Risk score (Timothy Figueroa., et al., 2013) failed to calculate for the following reasons: The 2013 ASCVD risk score is only valid for ages 36 to 78   . Review of Systems  10-point ROS is negative other than HPI. Physical Exam  Based off 1997 Exam Criteria  Ht 5' 7\" (1.702 m)   Wt 231 lb (104.8 kg)   BMI 36.18 kg/m²      Constitutional:       General: He is not in acute distress. Appearance: Normal appearance. Cardiovascular:      Rate and Rhythm: Normal rate and regular rhythm. Pulses: Normal pulses. Pulmonary:      Effort: Pulmonary effort is normal. No respiratory distress. Neurological:      Mental Status: He is alert and oriented to person, place, and time. Mental status is at baseline.      Musculoskeletal:  Gait:  antalgic    Spine / Hip Exam:      RIGHT  LEFT    Lumbar Spine Exam  [x] All Neg    [x] All Neg     Straight leg raise  []  []Not tested   []  []Not tested    Clonus  []  []Not tested   []  []Not tested    Pain with motion  []  []Not tested   []  []Not tested    Radiculopathy  []  []Not tested   []  []Not tested    Paraspinal muscle tenderness  [] Paraspinal  []Midline   [] Paraspinal  []Midline   Sensation RIGHT LEFT    L3  [x] Normal []Decreased    [x] Normal []Decreased   L4  [x] Normal  []Decreased   [x] Normal []Decreased   L5  [x] Normal []Decreased   [x] Normal []Decreased   S1  [x] Normal  []Decreased   [x] Normal []Decreased   Pelvis       Scoliosis  [x] Nml  [] Present     Leg-length discrepency  [x] Equal  [] Right longer   [] Left longer   Range of Motion Active Passive Active Passive   Hip Flexion 100  100    Abduction 30  30    External Rotation @ 90 flex 25  25    Internal Rotation @ 90 flex 10  0           Hip Impingement / Dysplasia  [] All Neg  [] Not tested   [] All Neg  [] Not tested    Hip impingement test  [x]  []Not tested   [x]  []Not tested    C-sign  [x]  []Not tested   [x]  []Not tested    Anterior instability apprehension  []  []Not tested   []  []Not tested    Posterior instability apprehension  []  []Not tested   []  []Not tested    Uncontained Internal rotation  []  []Not tested  []  []Not tested          Abductors  [x] All Neg  [] Not tested   [x] All Neg  [] Not tested    Medius strength  []  []Not tested   []  []Not tested    Minimum strength  []  []Not tested   []  []Not tested    IT band tendonitis  []  []Not tested   []  []Not tested    Trochanteric tenderness  []  []Not tested  []  []Not tested   Sciatic neuropathic pain  []  []Not tested   []  []Not tested           Post-arthroplasty  [] All Neg  [] Not tested   [] All Neg  [] Not tested    Rectus tendonitis  []  []Not tested   []  []Not tested    Iliopsoas tendonitis       Start-up pain  []  []Not tested   []  []Not tested          Imaging    Bilateral Hip: [facility]  Previous Radiographs: X-rays were ordered today and reviewed of both the right and left hips. 3 views. AP pelvis, lateral, and false profile. They demonstrate no evidence of fractures or dislocations. Severe osteoarthritis with AVN left hip. 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     Procedure:  No orders of the defined types were placed in this encounter. Assessment and Plan  Qian Gray was seen today for follow-up. Diagnoses and all orders for this visit:    Primary osteoarthritis of left hip    AVN (avascular necrosis of bone) (HCC)    Hip pain, left        Patient will proceed with a left anterior total hip arthroplasty at [facility]. He will need normal preoperative labs plus a nicotine test.  He did quit smoking approximately a week ago and is aware he needs to be nicotine free for at least 6 weeks before and 6 weeks after surgery. He is smoking does place him at increased risk of postoperative complications included but not limited to infection, DVT, pneumonia, and wound dehiscence. I discussed with Adrian Treviño. that his history, symptoms, signs and imaging are most consistent with hip arthritis and avascular necrosis    We reviewed the natural history of these conditions and treatment options ranging from conservative measures (rest, icing, activity modification, physical therapy, pain meds, cortisone injection)  to surgical options. We had a long discussion with the patient about their hip. We discussed surgical and non surgical options for hip arthritis. The most important thing is to work to maintain their range of motion. Next they can try medications including tylenol and NSAIDs. They can try glucosamine or chondroitin. They should also ice frequently and avoid activities that make their hip hurt. Cortisone injections and Synvisc injections are also options when medicine has failed. We finally discussed surgical options including arthroscopic debridement versus hip replacement. Often the arthritis is too far gone for an arthroscopic debridement and pain relief will be short term. Their ultimate solution will be a hip replacement when they are ready for it. They should put it off until they can no longer stand the pain and when nothing else has worked.      Conservative measures have failed. He is not interested in cortisone injections. I think he is an appropriate candidate for surgery due to his ongoing symptoms and dysfunction despite conservative measures. The procedure would be Left anterior  64503 Total Hip Arthroplasty    Perioperative considerations include: Pre-operative clearance from medical subspecialty. We reviewed the risks, benefits, alternatives of this approach. We discussed risks including, but not limited to, bleeding, pain, infection, scarring, damage to the neurovascular structures, blood clots, pulmonary embolus, stiffness, implant instability or loosening, implant failure, incomplete relief of pain, and incomplete return of function. We also reviewed the surgical details, expected recovery, and rehabilitation (6-9 months). He expressed understanding and will undergo preoperative medical evaluation and optimization. Electronically signed by Dasha Boo MD on 2/17/2022 at 8:41 AM  This dictation was generated by voice recognition computer software. Although all attempts are made to edit the dictation for accuracy, there may be errors in the transcription that are not intended.

## 2022-02-18 ENCOUNTER — TELEPHONE (OUTPATIENT)
Dept: ORTHOPEDIC SURGERY | Age: 37
End: 2022-02-18

## 2022-02-22 ENCOUNTER — HOSPITAL ENCOUNTER (OUTPATIENT)
Age: 37
Discharge: HOME | End: 2022-02-22
Payer: COMMERCIAL

## 2022-02-22 LAB
CREATININE URINE: 13.7 MG/DL (ref 39–259)
MICROALBUMIN UR-MCNC: <1.2 MG/DL
MICROALBUMIN/CREAT UR-RTO: ABNORMAL MG/G (ref 0–30)
PROTEIN PROTEIN: <4 MG/DL

## 2022-02-22 PROCEDURE — 84156 ASSAY OF PROTEIN URINE: CPT

## 2022-02-22 PROCEDURE — 82570 ASSAY OF URINE CREATININE: CPT

## 2022-02-22 PROCEDURE — 82043 UR ALBUMIN QUANTITATIVE: CPT

## 2022-02-22 PROCEDURE — 81404 MOPATH PROCEDURE LEVEL 5: CPT

## 2022-02-23 NOTE — TELEPHONE ENCOUNTER
Lydia Maher [id number]    Date: 03/28/22  Type of SX:  INPATIENT  Location: Health system  CPT: 29196   DX Code: M16.12  SX area: L HIP  Insurance: [facility]

## 2022-03-01 ENCOUNTER — HOSPITAL ENCOUNTER (OUTPATIENT)
Dept: PHYSICAL THERAPY | Age: 37
Setting detail: THERAPIES SERIES
Discharge: HOME | End: 2022-03-01
Payer: COMMERCIAL

## 2022-03-01 PROCEDURE — 97110 THERAPEUTIC EXERCISES: CPT | Performed by: PHYSICAL THERAPIST

## 2022-03-01 PROCEDURE — 97161 PT EVAL LOW COMPLEX 20 MIN: CPT | Performed by: PHYSICAL THERAPIST

## 2022-03-01 NOTE — PLAN OF CARE
[facility], [address]  Phone: [phone]  Fax [phone]     Physical Therapy Certification    Dear Referring Practitioner: Tamar Velasquez MD,    We had the pleasure of evaluating the following patient for physical therapy services at [address] [place] [address]. A summary of our findings can be found in the initial assessment below. This includes our plan of care. If you have any questions or concerns regarding these findings, please do not hesitate to contact me at the office phone number checked above. Thank you for the referral.       Physician Signature:_______________________________Date:__________________  By signing above (or electronic signature), therapists plan is approved by physician    Patient: Cathryn Thorpe. : 1985   MRN: [id number]  Referring Physician: Referring Practitioner: Tamar Velasquez MD      Evaluation Date: 3/1/2022      Medical Diagnosis Information:  Diagnosis: M16.12 (ICD-10-CM) - Primary osteoarthritis of left hipM87.00 (ICD-10-CM) - [facility]ascular necrosis [facility] bone) [facility]      Treatment diagnosis:Left hip pain M25.552, difficulty walking R26.2                                       Insurance information: PT Insurance Information:  ANTHEM DED$400MET 100% 50PT/OT/SP NO AUTH YES TELE MED     Precautions/ Contra-indications: L hip OA, HTN, L hip AVN  Latex Allergy:  [x]NO      []YES  Preferred Language for Healthcare:   [x]English       []other:    SUBJECTIVE: Patient stated complaint:Pt. Reports that he is undergoing L THR on 3/28/22.   Has been having more frequent pain episodes over the past year    Relevant Medical History:L hip OA, AVN  Functional Disability Index: LEFS    Height 5'7\" Weight 233 lb  Pain Scale: 3-10/10  Easing factors: ibuprofen PRN  Provocative factors: turning activity, lateral stepping, getting up from the floor, in and out of the truck    Type: []Constant   [x]Intermittent  []Radiating [x]Localized []other:     Numbness/Tingling: none[]    Occupation/School:construction    Living Status/Prior Level of Function: Independent with ADLs and IADLs, lives in 2 story home with BR in basement with walk out, has a 8year old daughter, used to play rec. softball    OBJECTIVE:           Reflexes/Myotomes:   []Dermatomes/Myotomes intact    []Reflexes equal and normal bilaterally   []Other:    Joint mobility:    []Normal    [x]Hypo L Hip   []Hyper    Palpation:     Functional Mobility/Transfers:     Posture:     Gait: (include devices/WB status) (+)trendelenburg R    Orthopedic Special Tests:                        [x] Patient history, allergies, meds reviewed. Medical chart reviewed. See intake form. Review Of Systems (ROS):  [x]Performed Review of systems (Integumentary, CardioPulmonary, Neurological) by intake and observation. Intake form has been scanned into medical record. Patient has been instructed to contact their primary care physician regarding ROS issues if not already being addressed at this time.       Co-morbidities/Complexities (which will affect course of rehabilitation):   []None           Arthritic conditions   []Rheumatoid arthritis (M05.9)  [x]Osteoarthritis (M19.91)   Cardiovascular conditions   []Hypertension (I10)  []Hyperlipidemia (E78.5)  []Angina pectoris (I20)  []Atherosclerosis (I70)   Musculoskeletal conditions   []Disc pathology   []Congenital spine pathologies   []Prior surgical intervention  []Osteoporosis (M81.8)  []Osteopenia (M85.8)   Endocrine conditions   []Hypothyroid (E03.9)  []Hyperthyroid Gastrointestinal conditions   []Constipation (P53.53)   Metabolic conditions   []Morbid obesity (E66.01)  []Diabetes type 1(E10.65) or 2 (E11.65)   []Neuropathy (G60.9)     Pulmonary conditions   []Asthma (J45)  []Coughing   []COPD (J44.9)   Psychological Disorders  []Anxiety (F41.9)  []Depression (F32.9)   []Other: [x]Other:AVN          Barriers to/and or personal factors that will affect rehab potential:              []Age  []Sex              []Motivation/Lack of Motivation                        []Co-Morbidities              []Cognitive Function, education/learning barriers              []Environmental, home barriers              [x]profession/work barriers  []past PT/medical experience  []other:  Justification:     Falls Risk Assessment (30 days):   [x] Falls Risk assessed and no intervention required.   [] Falls Risk assessed and Patient requires intervention due to being higher risk   TUG score (>12s at risk):     [] Falls education provided, including       G-Codes:       ASSESSMENT:   Functional Impairments:     [x]Noted lumbar/proximal hip/LE joint hypomobility   [x]Decreased LE functional ROM   [x]Decreased core/proximal hip strength and neuromuscular control   []Decreased LE functional strength   []Reduced balance/proprioceptive control   []other:      Functional Activity Limitations (from functional questionnaire and intake)   [x]Reduced ability to tolerate prolonged functional positions   [x]Reduced ability or difficulty with changes of positions or transfers between positions   []Reduced ability to maintain good posture and demonstrate good body mechanics with sitting, bending, and lifting   [x]Reduced ability to sleep   [] Reduced ability or tolerance with driving and/or computer work   [x]Reduced ability to perform lifting, carrying tasks   [x]Reduced ability to squat   []Reduced ability to forward bend   [x]Reduced ability to ambulate prolonged functional periods/distances/surfaces   [x]Reduced ability to ascend/descend stairs   [x]Reduced ability to run, hop, cut or jump   []other:    Participation Restrictions   []Reduced participation in self care activities   [x]Reduced participation in home management activities   []Reduced participation in work activities   [x]Reduced participation in social activities. [x]Reduced participation in sport/recreation activities. Classification :    []Signs/symptoms consistent with post-surgical status including decreased ROM, strength and function. []Signs/symptoms consistent with joint sprain/strain   []Signs/symptoms consistent with patella-femoral syndrome   [x]Signs/symptoms consistent with knee OA/hip OA   []Signs/symptoms consistent with internal derangement of knee/Hip   [x]Signs/symptoms consistent with functional hip weakness/NMR control      []Signs/symptoms consistent with tendinitis/tendinosis    []signs/symptoms consistent with pathology which may benefit from Dry needling      []other:      Prognosis/Rehab Potential:      []Excellent   []Good    [x]Fair   []Poor    Tolerance of evaluation/treatment:    []Excellent   [x]Good    []Fair   []Poor  Physical Therapy Evaluation Complexity Justification  [x] A history of present problem with:  [] no personal factors and/or comorbidities that impact the plan of care;  [x]1-2 personal factors and/or comorbidities that impact the plan of care  []3 personal factors and/or comorbidities that impact the plan of care  [x] An examination of body systems using standardized tests and measures addressing any of the following: body structures and functions (impairments), activity limitations, and/or participation restrictions;:  [] a total of 1-2 or more elements   [x] a total of 3 or more elements   [] a total of 4 or more elements   [x] A clinical presentation with:  [x] stable and/or uncomplicated characteristics   [] evolving clinical presentation with changing characteristics  [] unstable and unpredictable characteristics;   [x] Clinical decision making of [x] low, [] moderate, [] high complexity using standardized patient assessment instrument and/or measurable assessment of functional outcome.     [x] EVAL (LOW) 10038 (typically 20 minutes face-to-face)  [] EVAL (MOD) 44094 (typically 30 minutes face-to-face)  [] EVAL (HIGH) 85845 (typically 45 minutes face-to-face)  [] RE-EVAL       PLAN:   Frequency/Duration:  1 days per week for 1-4 Weeks:  Interventions:  [x]  Therapeutic exercise including: strength training, ROM, for Lower extremity and core   [x]  NMR activation and proprioception for LE, Glutes and Core   [x]  Manual therapy as indicated for LE, Hip and spine to include: Dry Needling/IASTM, STM, PROM, Gr I-IV mobilizations, manipulation. [x] Modalities as needed that may include: thermal agents, E-stim, Biofeedback, US, iontophoresis as indicated  [x] Patient education on joint protection, postural re-education, activity modification, progression of HEP. [x] Patient appears to be functionally prepared for surgery and will continue with a home exercise program until surgery date, but could benefit from PT to increase hip mobility as pt. Schedule allows. [] Patient will be ready for surgery with a short period of structured physical therapy  [] Patient does not appear to be functionally ready for surgery and requires a prolonged  structure program    At this point, it appears patient's post-operative discharge status from hospital should be:   [x] Home with home exercise program and outpatient PT  [] Home with home health care and   [] Skilled nursing facility/ Inpatient Rehab    HEP instruction: refer to [address] [place] [address] access code and exercises on the 1st visit treatment note    GOALS:  Patient stated goal: be prepared for surgery    Therapist goals for Patient:   Short Term Goals: To be achieved in: 1-4  weeks  1. Independent in HEP and progression per patient tolerance, in order to prevent re-injury. [] Progressing: [] Met: [] Not Met: [] Adjusted  2. Patient will have a decrease in pain to facilitate improvement in movement, function, and ADLs as indicated by Functional Deficits.   [] Progressing: [] Met: [] Not Met: [] Adjusted      Electronically signed by:  Kimo Allen PT, [address] [facility], [facility]

## 2022-03-01 NOTE — FLOWSHEET NOTE
[facility],  [address]  Phone: [phone]  Fax [phone]    Physical Therapy Daily Treatment Note  Date:  3/1/2022    Patient Name:  Alvaro Isabel :  1985  MRN: [id number]  Restrictions/Precautions:    Medical/Treatment Diagnosis Information:  · Diagnosis: M16.12 (ICD-10-CM) - Primary osteoarthritis of left hipM87.00 (ICD-10-CM) - AVN (avascular necrosis of bone) ([facility])  ·    Treatment diagnosis:Left hip pain M25.552, difficulty walking J81.7  Insurance/Certification information:  PT Insurance Information:  ANTHEM DED$400MET 100% 50PT/OT/SP NO AUTH Unknown Ernesto MED  Physician Information:  Referring Practitioner:  Darline Mendez MD  Plan of care signed (Y/N):     Date of Patient follow up with Physician:     G-Code (if applicable):      Date G-Code Applied:         Progress Note: [x]  Yes  []  No  Next due by: Visit #10       Latex Allergy:  [x]NO      []YES  Preferred Language for Healthcare:   [x]English       []other:       Visit # Insurance Allowable Auth Required   In-person 1 50 []  Yes [x]  No    Telehealth   []  Yes []  No    Total        Pain level: 3-1010     SUBJECTIVE:  See eval    OBJECTIVE: See eval   Observation:    Test measurements:      RESTRICTIONS/PRECAUTIONS: Hip OA, AVN    Exercises/Interventions:     Therapeutic Ex Sets/reps Notes        Seated HS stretch 3x30 sec HEP   Seated calf stretch 3x30 sec HEP   Seated QS BLEs 10 sec 10x HEP   Supine heelslide 1x10 HEP   Isometric abdominal 10 sec 10x HEP   LAQ  1x10 HEP   minisquats 1x10 HEP                                           Manual Intervention                                   NMR re-education                                                      Therapeutic Exercise and NMR EXR  [x] (22287) Provided verbal/tactile cueing for activities related to strengthening, flexibility, endurance, ROM for improvements in LE, proximal hip, and core control with self care, mobility, lifting, ambulation.  [] (13695) Provided verbal/tactile cueing for activities related to improving balance, coordination, kinesthetic sense, posture, motor skill, proprioception  to assist with LE, proximal hip, and core control in self care, mobility, lifting, ambulation and eccentric single leg control. NMR and Therapeutic Activities:    [] (76753 or 40999) Provided verbal/tactile cueing for activities related to improving balance, coordination, kinesthetic sense, posture, motor skill, proprioception and motor activation to allow for proper function of core, proximal hip and LE with self care and ADLs  [] (02579) Gait Re-education- Provided training and instruction to the patient for proper LE, core and proximal hip recruitment and positioning and eccentric body weight control with ambulation re-education including up and down stairs     Home Exercise Program:    [x] (20782) Reviewed/Progressed HEP activities related to strengthening, flexibility, endurance, ROM of core, proximal hip and LE for functional self-care, mobility, lifting and ambulation/stair navigation   [] (78061)Reviewed/Progressed HEP activities related to improving balance, coordination, kinesthetic sense, posture, motor skill, proprioception of core, proximal hip and LE for self care, mobility, lifting, and ambulation/stair navigation      Manual Treatments:  PROM / STM / Oscillations-Mobs:  G-I, II, III, IV (PA's, Inf., Post.)  [] (47408) Provided manual therapy to mobilize LE, proximal hip and/or LS spine soft tissue/joints for the purpose of modulating pain, promoting relaxation,  increasing ROM, reducing/eliminating soft tissue swelling/inflammation/restriction, improving soft tissue extensibility and allowing for proper ROM for normal function with self care, mobility, lifting and ambulation.      Modalities:      Charges:  Timed Code Treatment Minutes: 15   Total Treatment Minutes: 45     [x] EVAL (LOW) 66959 (typically 20 minutes face-to-face)  [] EVAL (MOD) 69540 (typically 30 minutes face-to-face)  [] EVAL (HIGH) 81912 (typically 45 minutes face-to-face)  [] RE-EVAL     [x] TR(67831) x      [] IONTO  [] NMR (84483) x      [] VASO  [] Manual (48050) x       [] Other:  [] TA x       [] Mech Traction (26644)  [] ES(attended) (42014)      [] ES (un) (22023):     GOALS:   Patient stated goal: To be prepared for surgery      Therapist goals for Patient:    Short Term Goals: To be achieved in: 2 weeks  1. Independent in HEP and progression per patient tolerance, in order to prevent re-injury and to prepare for surgery by strength and flexibility therex . Progression Towards Functional goals:  [] Patient is progressing as expected towards functional goals listed. [] Progression is slowed due to complexities listed. [] Progression has been slowed due to co-morbidities. [x] Plan just implemented, too soon to assess goals progression  [] Other:     ASSESSMENT:  See eval    Treatment/Activity Tolerance:  [x] Patient tolerated treatment well [] Patient limited by fatique  [] Patient limited by pain  [] Patient limited by other medical complications  [] Other:     Prognosis: [x] Good [] Fair  [] Poor        Recommend pt follow through with surgery date, recommend DC to home following sx.     Patient Requires Follow-up: [] Yes  [] No    PLAN: See eval      [] Continue per plan of care [] Alter current plan (see comments)  [x] Plan of care initiated [] Discharge    Electronically signed by: Timothy Mueller, PT, MSPT, OMT-C [id number]

## 2022-03-03 LAB — MISCELLANEOUS LAB TEST ORDER: NORMAL

## 2022-03-07 ENCOUNTER — OFFICE VISIT (OUTPATIENT)
Dept: FAMILY MEDICINE CLINIC | Age: 37
End: 2022-03-07
Payer: COMMERCIAL

## 2022-03-07 VITALS
WEIGHT: 237 LBS | DIASTOLIC BLOOD PRESSURE: 78 MMHG | BODY MASS INDEX: 37.2 KG/M2 | HEIGHT: 67 IN | OXYGEN SATURATION: 98 % | SYSTOLIC BLOOD PRESSURE: 135 MMHG | HEART RATE: 76 BPM

## 2022-03-07 DIAGNOSIS — M16.12 PRIMARY OSTEOARTHRITIS OF LEFT HIP: ICD-10-CM

## 2022-03-07 DIAGNOSIS — I10 ESSENTIAL HYPERTENSION: ICD-10-CM

## 2022-03-07 DIAGNOSIS — M25.552 HIP PAIN, LEFT: ICD-10-CM

## 2022-03-07 DIAGNOSIS — K21.9 GASTROESOPHAGEAL REFLUX DISEASE, UNSPECIFIED WHETHER ESOPHAGITIS PRESENT: ICD-10-CM

## 2022-03-07 DIAGNOSIS — E66.09 CLASS 2 OBESITY DUE TO EXCESS CALORIES WITHOUT SERIOUS COMORBIDITY WITH BODY MASS INDEX (BMI) OF 36.0 TO 36.9 IN ADULT: ICD-10-CM

## 2022-03-07 DIAGNOSIS — Z01.818 PRE-OPERATIVE EXAMINATION: Primary | ICD-10-CM

## 2022-03-07 PROBLEM — L03.116 BILATERAL LOWER LEG CELLULITIS: Status: RESOLVED | Noted: 2019-10-13 | Resolved: 2022-03-07

## 2022-03-07 PROBLEM — R60.1 GENERALIZED EDEMA: Status: RESOLVED | Noted: 2022-01-28 | Resolved: 2022-03-07

## 2022-03-07 PROBLEM — L03.115 BILATERAL LOWER LEG CELLULITIS: Status: RESOLVED | Noted: 2019-10-13 | Resolved: 2022-03-07

## 2022-03-07 LAB
BILIRUBIN, POC: ABNORMAL
BLOOD URINE, POC: ABNORMAL
CLARITY, POC: CLEAR
COLOR, POC: YELLOW
GLUCOSE URINE, POC: ABNORMAL
KETONES, POC: ABNORMAL
LEUKOCYTE EST, POC: ABNORMAL
NITRITE, POC: ABNORMAL
PH, POC: 7.5
PROTEIN, POC: ABNORMAL
SPECIFIC GRAVITY, POC: 1.02
UROBILINOGEN, POC: 0.2

## 2022-03-07 PROCEDURE — 81002 URINALYSIS NONAUTO W/O SCOPE: CPT | Performed by: PHYSICIAN ASSISTANT

## 2022-03-07 PROCEDURE — 99214 OFFICE O/P EST MOD 30 MIN: CPT | Performed by: PHYSICIAN ASSISTANT

## 2022-03-07 NOTE — PROGRESS NOTES
Preoperative Consultation      Kirti Moody. YOB: 1985    Date of Service:  3/7/2022    Vitals:    03/07/22 1021 03/07/22 1033   BP: (!) 130/52 135/78   Pulse: 76    SpO2: 98%    Weight: 237 lb (107.5 kg)    Height: 5' 7\" (1.702 m)       Wt Readings from Last 2 Encounters:   03/07/22 237 lb (107.5 kg)   02/17/22 233 lb (105.7 kg)     BP Readings from Last 3 Encounters:   03/07/22 135/78   02/11/22 112/70   01/28/22 118/80        Chief Complaint   Patient presents with    Pre-op Exam     Surgery 3/28, [facility], L THR, Dr. Destiney Cotton      Allergies   Allergen Reactions    Cefepime Itching and Swelling     Lip swelling. Outpatient Medications Marked as Taking for the 3/7/22 encounter (Office Visit) with Lavern Harada, PA   Medication Sig Dispense Refill    mupirocin (BACTROBAN) 2 % ointment Apply twice daily to each nare for 5 days prior to surgical procedure 1 g 0    hydroCHLOROthiazide (HYDRODIURIL) 25 MG tablet Take 1 tablet by mouth every morning 90 tablet 0    lisinopril (PRINIVIL;ZESTRIL) 10 MG tablet Take 1 tablet by mouth daily 90 tablet 0    famotidine (PEPCID) 10 MG tablet Take 10 mg by mouth 2 times daily as needed         This patient presents to the office today for a preoperative consultation at the request of surgeon, Dr. Renu Faustin, who plans on performing L[facility]R on March 28 at [facility].  The current problem began several months ago, and symptoms have been worsening with time. Conservative therapy: N/A. The patient reports pain stiffness in the left hip joint.     Planned anesthesia: General   Known anesthesia problems: None   Bleeding risk: No recent or remote history of abnormal bleeding  Personal or FH of DVT/PE: No    Patient objection to receiving blood products: No    Patient Active Problem List   Diagnosis    Class 2 obesity due to excess calories without serious comorbidity with body mass index (BMI) of 36.0 to 36.9 in adult    Gastroesophageal reflux disease    Essential hypertension    Discoloration of nail    Femoral acetabular impingement    Primary osteoarthritis of left hip    Hip pain, left       Past Medical History:   Diagnosis Date    Arthritis     H/O degenerative disc disease     Hypertension     Osteoarthritis      Past Surgical History:   Procedure Laterality Date    HIP SURGERY       Family History   Problem Relation Age of Onset    Diabetes Mother     High Cholesterol Mother     High Blood Pressure Mother     High Blood Pressure Father     High Cholesterol Father     Cancer Father         skin cancer    Heart Attack Maternal Aunt     Cancer Maternal Aunt         stomach cancer    Heart Attack Maternal Uncle     Alcohol Abuse Paternal Uncle     Stroke Maternal Grandfather     Heart Attack Maternal Grandfather      Social History     Socioeconomic History    Marital status:      Spouse name: Not on file    Number of children: Not on file    Years of education: Not on file    Highest education level: Not on file   Occupational History    Not on file   Tobacco Use    Smoking status: Former Smoker     Packs/day: 0.25     Types: Cigarettes     Quit date: 2022     Years since quittin.0    Smokeless tobacco: Current User     Types: Snuff   Vaping Use    Vaping Use: Never used   Substance and Sexual Activity    Alcohol use:  Yes     Alcohol/week: 10.0 standard drinks     Types: 10 Cans of beer per week    Drug use: Yes     Types: Marijuana Deboraha Sanes)    Sexual activity: Yes     Partners: Female   Other Topics Concern    Not on file   Social History Narrative    Not on file     Social Determinants of Health     Financial Resource Strain: Low Risk     Difficulty of Paying Living Expenses: Not hard at all   Food Insecurity: No Food Insecurity    Worried About Running Out of Food in the Last Year: Never true    Ashley of Food in the Last Year: Never true   Transportation Needs:     Lack of Transportation (Medical): Not on file    Lack of Transportation (Non-Medical): Not on file   Physical Activity:     Days of Exercise per Week: Not on file    Minutes of Exercise per Session: Not on file   Stress:     Feeling of Stress : Not on file   Social Connections:     Frequency of Communication with Friends and Family: Not on file    Frequency of Social Gatherings with Friends and Family: Not on file    Attends Restorationism Services: Not on file    Active Member of [address] [place] [address] [employer] or Organizations: Not on file    Attends Club or Organization Meetings: Not on file    Marital Status: Not on file   Intimate Partner Violence:     Fear of Current or Ex-Partner: Not on file    Emotionally Abused: Not on file    Physically Abused: Not on file    Sexually Abused: Not on file   Housing Stability:     Unable to Pay for Housing in the Last Year: Not on file    Number of [place] in the Last Year: Not on file    Unstable Housing in the Last Year: Not on file       Review of Systems  A comprehensive review of systems was negative except for what was noted in the HPI. Physical Exam   Constitutional: He is oriented to person, place, and time. He appears well-developed and well-nourished. No distress. HENT:   Head: Normocephalic and atraumatic. Eyes: Conjunctivae and EOM are normal. Pupils are equal, round, and reactive to light. Neck: Trachea normal and normal range of motion. Neck supple. No mass and no thyromegaly present. Cardiovascular: Normal rate, regular rhythm, normal heart sounds and intact distal pulses. Exam reveals no gallop and no friction rub. No murmur heard. Pulmonary/Chest: Effort normal and breath sounds normal. No respiratory distress. He has no wheezes. He has no rales. Abdominal: Soft. Normal aorta and bowel sounds are normal. He exhibits no distension and no mass. There is no hepatosplenomegaly. No tenderness.    Musculoskeletal: He exhibits tenderness of left hip with squatting and standing  Neurological: He is alert and oriented to person, place, and time. He has normal strength. No cranial nerve deficit or sensory deficit. Coordination and gait normal.   Skin: Skin is warm and dry. No rash noted. No erythema. Psychiatric: He has a normal mood and affect. His behavior is normal.     EKG Interpretation:  normal EKG, normal sinus rhythm, unchanged from previous tracings. Lab Review   Lab Results   Component Value Date     03/11/2022    K 4.5 03/11/2022    K 3.9 01/16/2022    CL 98 03/11/2022    CO2 26 03/11/2022    BUN 15 03/11/2022    CREATININE 0.9 03/11/2022    GLUCOSE 94 03/11/2022    CALCIUM 9.5 03/11/2022     Lab Results   Component Value Date    WBC 6.6 03/11/2022    HGB 16.2 03/11/2022    HCT 46.8 03/11/2022    MCV 93.4 03/11/2022     03/11/2022      Color, UA yellow    Clarity, UA clear    Glucose, UA POC neg    Bilirubin, UA neg    Ketones, UA neg    Spec Grav, UA 1.020    Blood, UA POC trace    pH, UA 7.5    Protein, UA POC neg    Urobilinogen, UA 0.2    Leukocytes, UA neg    Nitrite, UA neg     Urine Culture, Routine No growth at 18 to 36 hours      MRSA Culture Only No S aureus MRSA isolated   S aureus MSSA present      Carboxyhemoglobin % 1.8    Comment:      0.0-1.5   (Smokers 1.5-5.0)      Hemoglobin A1C See comment % 5.2      Component Ref Range & Units 3 d ago 1 mo ago 2 yr ago 3 yr ago 4 yr ago   Albumin 3.4 - 5.0 g/dL 4.2  4.4  3.9  4.1  4.0      Transferrin 200.0 - 360.0 mg/dL 259.0      aPTT 26.2 - 38.6 sec 33.1      Protime 9.9 - 12.7 sec 10.9    Comment: Effective 6-30-21 09:00am EST   Please note reference ranges have   changed for PT and INR Testing. INR 0.88 - 1.12 0.97           Assessment:       29 y.o. patient with planned surgery as above.     Known risk factors for perioperative complications: Hypertension, bilateral lower extremity edema  Current medications which may produce withdrawal symptoms if withheld perioperatively: none Plan:     1. Preoperative workup as follows: hemoglobin, hematocrit, electrolytes, creatinine, glucose, liver function studies, coagulation studies  2. Change in medication regimen before surgery: Hold all medications on morning of surgery  3. Prophylaxis for cardiac events with perioperative beta-blockers: Not indicated  ACC/AHA indications for pre-operative beta-blocker use:    · Vascular surgery with history of postitive stress test  · Intermediate or high risk surgery with history of CAD   · Intermediate or high risk surgery with multiple clinical predictors of CAD- 2 of the following: history of compensated or prior heart failure, history of cerebrovascular disease, DM, or renal insufficiency    Routine administration of higher-dose, long-acting metoprolol in beta-blocker-naïve patients on the day of surgery, and in the absence of dose titration is associated with an overall increase in mortality. Beta-blockers should be started days to weeks prior to surgery and titrated to pulse < 70.  4. Deep vein thrombosis prophylaxis: regimen to be chosen by surgical team  5. No contraindications to planned surgery.

## 2022-03-08 ENCOUNTER — TELEPHONE (OUTPATIENT)
Dept: ORTHOPEDIC SURGERY | Age: 37
End: 2022-03-08

## 2022-03-08 LAB — URINE CULTURE, ROUTINE: NORMAL

## 2022-03-10 LAB — MRSA CULTURE ONLY: NORMAL

## 2022-03-11 DIAGNOSIS — Z01.818 PRE-OPERATIVE EXAMINATION: ICD-10-CM

## 2022-03-11 LAB
ALBUMIN SERPL-MCNC: 4.2 G/DL (ref 3.4–5)
ANION GAP SERPL CALCULATED.3IONS-SCNC: 13 MMOL/L (ref 3–16)
APTT: 33.1 SEC (ref 26.2–38.6)
BASOPHILS ABSOLUTE: 0.1 K/UL (ref 0–0.2)
BASOPHILS RELATIVE PERCENT: 1.1 %
BUN BLDV-MCNC: 15 MG/DL (ref 7–20)
CALCIUM SERPL-MCNC: 9.5 MG/DL (ref 8.3–10.6)
CARBOXYHEMOGLOBIN: 1.8 %
CHLORIDE BLD-SCNC: 98 MMOL/L (ref 99–110)
CO2: 26 MMOL/L (ref 21–32)
CREAT SERPL-MCNC: 0.9 MG/DL (ref 0.9–1.3)
EOSINOPHILS ABSOLUTE: 0.1 K/UL (ref 0–0.6)
EOSINOPHILS RELATIVE PERCENT: 2 %
GFR AFRICAN AMERICAN: >60
GFR NON-AFRICAN AMERICAN: >60
GLUCOSE BLD-MCNC: 94 MG/DL (ref 70–99)
HCT VFR BLD CALC: 46.8 % (ref 40.5–52.5)
HEMOGLOBIN: 16.2 G/DL (ref 13.5–17.5)
INR BLD: 0.97 (ref 0.88–1.12)
LYMPHOCYTES ABSOLUTE: 2.2 K/UL (ref 1–5.1)
LYMPHOCYTES RELATIVE PERCENT: 33.2 %
MCH RBC QN AUTO: 32.3 PG (ref 26–34)
MCHC RBC AUTO-ENTMCNC: 34.6 G/DL (ref 31–36)
MCV RBC AUTO: 93.4 FL (ref 80–100)
MONOCYTES ABSOLUTE: 0.6 K/UL (ref 0–1.3)
MONOCYTES RELATIVE PERCENT: 8.6 %
NEUTROPHILS ABSOLUTE: 3.7 K/UL (ref 1.7–7.7)
NEUTROPHILS RELATIVE PERCENT: 55.1 %
PDW BLD-RTO: 14 % (ref 12.4–15.4)
PLATELET # BLD: 234 K/UL (ref 135–450)
PMV BLD AUTO: 8.6 FL (ref 5–10.5)
POTASSIUM SERPL-SCNC: 4.5 MMOL/L (ref 3.5–5.1)
PROTHROMBIN TIME: 10.9 SEC (ref 9.9–12.7)
RBC # BLD: 5.01 M/UL (ref 4.2–5.9)
SODIUM BLD-SCNC: 137 MMOL/L (ref 136–145)
TRANSFERRIN: 259 MG/DL (ref 200–360)
WBC # BLD: 6.6 K/UL (ref 4–11)

## 2022-03-12 LAB
ESTIMATED AVERAGE GLUCOSE: 102.5 MG/DL
HBA1C MFR BLD: 5.2 %

## 2022-03-15 ENCOUNTER — TELEPHONE (OUTPATIENT)
Dept: ORTHOPEDIC SURGERY | Age: 37
End: 2022-03-15

## 2022-03-15 NOTE — TELEPHONE ENCOUNTER
COVID:NA    ORTHOPAEDIC NURSE NAVIGATOR SUMMARY NOTE      Anticipated Date of Surgery: 3/28/22    Recieved Pre-Op Education: yes   In person class:No  Pt used educational link:Yes   Pt completed pre and post test:Yes   If pt did not complete either, why not? Convience      PCP: LYDIA Arora   Phone #: [phone]    Date of PCP Visit for H&P: 3/7/22    Any Noted Concerns from PCP prior to surgery:  No   If Yes, what concerns?:    IS THE PATIENT IN A PAIN MANAGEMENT PROGRAM?:   No     Review of Past Medical History Reveals History of:      Critical Lab Values:   Hgb/Hct:   Hemoglobin (g/dL)   Date Value   03/11/2022 16.2   /  Hematocrit (%)   Date Value   03/11/2022 46.8      HgbA1C:    Lab Results   Component Value Date    LABA1C 5.2 03/11/2022      Albumin:    Lab Results   Component Value Date    LABALBU 4.2 03/11/2022      BUN/Cr:   BUN (mg/dL)   Date Value   03/11/2022 15   /  CREATININE (mg/dL)   Date Value   03/11/2022 0.9      BMI:    BMI Readings from Last 1 Encounters:   03/07/22 37.12 kg/m²        Coronary Artery Disease/HTN/CHF History: Yes- HTN     Cardiologist:     Cardiac Clearance Necessary: No    Date of Cardiac Clearance Appt: On Plavix? No,  If YES, when will they stop taking? Final Cardiac Recommendations:N/A   -On any anticoagulation-none         Diabetes History: No    Most Recent HgbA1C: N/A    PCP or Endocrine Recommendations: N/A    Nutritionist/Dietician Consult Scheduled: N/A    Final Plan For Diabetic Control: N/A   Pulmonary: COPD/Emphysema/ Use of home oxygen: NONE     Alcohol use:none        DVT Risk Stratification:  Low      Vascular Consult Ordered:  NA    Date of Vascular Appt:     Hematology/Oncology Consult Ordered:  NA    Date of Hematology/Oncology Appt:     Final Recommendation For DVT Prophylaxis:   -Smoking history or use of estrogen-NONE         BMI Greater than 40 at time of scheduling?: No    Has Surgeon been notified of BMI concern?  Not Applicable    Weight Loss Clinic Consult Ordered: No    Date of Wt Loss Clinic Appt:     BMI at time of surgery (if went through [facility]): Additional Medical Concerns:         Discharge Disposition Information:     Attended Pre-Hab Program: yes     Anticipated Discharge Disposition:  Home with OP PT    Who will be with patient at home following discharge? Wife and mother      Equipment pt already has:  Walker w wheels   Bedroom on first or second floor: first   [place]olph on first or second floor: First   Weight bearing status: Full   Pre-op ambulatory status: none   Number of entry steps: tri-level home. Can sleep on first level with bathroom.     Caregiver assistance: Full time    Pt plan to DC same day: Same day-    [facility] preference: RANI Toledo, RN  3/21/2022

## 2022-03-21 ENCOUNTER — ANESTHESIA EVENT (OUTPATIENT)
Dept: OPERATING ROOM | Age: 37
DRG: 470 | End: 2022-03-21
Payer: COMMERCIAL

## 2022-03-21 NOTE — PROGRESS NOTES
[place] Age 29 y.o.    male    1985    MRN [id number]    3/28/2022  Arrival Time_____________  OR Time____________165 Min     Procedure(s):  LEFT TOTAL HIP ARTHROPLASTY, MINIMALLY INVASIVE DIRECT ANTERIOR WITH FASCIAILIACA BLOCK FOR PAIN CONTROL               ANDREA BIOMET                      General     Surgeon(s): Barrett King, MD      DAY ADMIT ___  SDS/OP ___  OUTPT IN BED ___         Phone [phone] (home) [phone] (work)   PCP _____________________ Phone_________________ [address] ( ) Epic CE ( ) Appt ________    ADDITIONAL INFO __________________________________ Cardio/Consult _____________    NOTES _____________________________________________________________________    ____________________________________________________________________________    PAT APPT DATE:________ TIME: ________  FAXED QAD: _______  (__) H&P w/ hospitalist  ____________________________________________________________________________    COVID TEST: Date/Location______________        NURSING HISTORY COMPLETE: _______  (__) CBC       (__) W/ DIFF ___________  (__)  ECHO    __________  (__) Hgb A1C    ___________  (__) CHEST X RAY   __________  (__) LIPID PROFILE  ___________  (__) EKG   __________  (__) PT/PTT   ___________  (__) PFT's   __________  (__) BMP   ___________  (__) CAROTIDS  __________  (__) CMP   ___________  (__) VEIN MAPPING  __________  (__) U/A   ___________  (__) HISTORY & PHYSICAL __________  (__) URINE C & S  ___________  (__) CARDIAC CLEARANCE __________  (__) U/A W/ FLEX  ___________  (__) PULM.  CLEARANCE __________  (__) SERUM PREGNANCY ___________  (__) Check Epic DOS orders __________  (__) TYPE & SCREEN ________ repeat ( ) (__)  __________________ __________  (__) ALBUMIN   ___________  (__)  __________________ __________  (__) TRANSFERRIN  ___________  (__)  __________________ __________  (__) LIVER PROFILE  ___________  (__)  __________________ __________  (__) DHIRAJ HGB  ___________  (__) URINE PREG DOS __________  (__) NICOTINE & MET.  ___________  (__) BLOOD SUGAR DOS __________  (__) PREALBUMIN  ___________    (__) MRSA NASAL SWAB ___________  (__) BLOOD THINNERS __________  (__) ACE/ ARBS: _____________________    (__) BETABLOCKERS ___________________

## 2022-03-25 ENCOUNTER — TELEPHONE (OUTPATIENT)
Dept: ORTHOPEDIC SURGERY | Age: 37
End: 2022-03-25

## 2022-03-28 ENCOUNTER — APPOINTMENT (OUTPATIENT)
Dept: GENERAL RADIOLOGY | Age: 37
DRG: 470 | End: 2022-03-28
Attending: ORTHOPAEDIC SURGERY
Payer: COMMERCIAL

## 2022-03-28 ENCOUNTER — HOSPITAL ENCOUNTER (INPATIENT)
Age: 37
LOS: 1 days | Discharge: HOME | DRG: 470 | End: 2022-03-28
Attending: ORTHOPAEDIC SURGERY | Admitting: ORTHOPAEDIC SURGERY
Payer: COMMERCIAL

## 2022-03-28 ENCOUNTER — ANESTHESIA (OUTPATIENT)
Dept: OPERATING ROOM | Age: 37
DRG: 470 | End: 2022-03-28
Payer: COMMERCIAL

## 2022-03-28 VITALS
SYSTOLIC BLOOD PRESSURE: 149 MMHG | DIASTOLIC BLOOD PRESSURE: 89 MMHG | RESPIRATION RATE: 16 BRPM | TEMPERATURE: 98.6 F | OXYGEN SATURATION: 100 %

## 2022-03-28 VITALS
DIASTOLIC BLOOD PRESSURE: 106 MMHG | BODY MASS INDEX: 37.23 KG/M2 | WEIGHT: 237.2 LBS | HEART RATE: 96 BPM | HEIGHT: 67 IN | TEMPERATURE: 97.2 F | SYSTOLIC BLOOD PRESSURE: 174 MMHG | RESPIRATION RATE: 16 BRPM | OXYGEN SATURATION: 97 %

## 2022-03-28 DIAGNOSIS — M16.12 PRIMARY OSTEOARTHRITIS OF LEFT HIP: Primary | ICD-10-CM

## 2022-03-28 LAB
ABO/RH: NORMAL
ANTIBODY SCREEN: NORMAL

## 2022-03-28 PROCEDURE — 3209999900 FLUORO FOR SURGICAL PROCEDURES

## 2022-03-28 PROCEDURE — 2709999900 HC NON-CHARGEABLE SUPPLY: Performed by: ORTHOPAEDIC SURGERY

## 2022-03-28 PROCEDURE — 86901 BLOOD TYPING SEROLOGIC RH(D): CPT

## 2022-03-28 PROCEDURE — 86900 BLOOD TYPING SEROLOGIC ABO: CPT

## 2022-03-28 PROCEDURE — A4216 STERILE WATER/SALINE, 10 ML: HCPCS | Performed by: ANESTHESIOLOGY

## 2022-03-28 PROCEDURE — 3600000015 HC SURGERY LEVEL 5 ADDTL 15MIN: Performed by: ORTHOPAEDIC SURGERY

## 2022-03-28 PROCEDURE — 6360000002 HC RX W HCPCS: Performed by: ORTHOPAEDIC SURGERY

## 2022-03-28 PROCEDURE — 97161 PT EVAL LOW COMPLEX 20 MIN: CPT

## 2022-03-28 PROCEDURE — 0SRB03Z REPLACEMENT OF LEFT HIP JOINT WITH CERAMIC SYNTHETIC SUBSTITUTE, OPEN APPROACH: ICD-10-PCS | Performed by: ORTHOPAEDIC SURGERY

## 2022-03-28 PROCEDURE — 2500000003 HC RX 250 WO HCPCS: Performed by: ANESTHESIOLOGY

## 2022-03-28 PROCEDURE — 3700000000 HC ANESTHESIA ATTENDED CARE: Performed by: ORTHOPAEDIC SURGERY

## 2022-03-28 PROCEDURE — 86850 RBC ANTIBODY SCREEN: CPT

## 2022-03-28 PROCEDURE — 2720000010 HC SURG SUPPLY STERILE: Performed by: ORTHOPAEDIC SURGERY

## 2022-03-28 PROCEDURE — 1200000000 HC SEMI PRIVATE

## 2022-03-28 PROCEDURE — 3600000005 HC SURGERY LEVEL 5 BASE: Performed by: ORTHOPAEDIC SURGERY

## 2022-03-28 PROCEDURE — A4217 STERILE WATER/SALINE, 500 ML: HCPCS | Performed by: ORTHOPAEDIC SURGERY

## 2022-03-28 PROCEDURE — 2580000003 HC RX 258: Performed by: ORTHOPAEDIC SURGERY

## 2022-03-28 PROCEDURE — 27130 TOTAL HIP ARTHROPLASTY: CPT | Performed by: ORTHOPAEDIC SURGERY

## 2022-03-28 PROCEDURE — 6360000002 HC RX W HCPCS

## 2022-03-28 PROCEDURE — 2580000003 HC RX 258: Performed by: ANESTHESIOLOGY

## 2022-03-28 PROCEDURE — 7100000010 HC PHASE II RECOVERY - FIRST 15 MIN: Performed by: ORTHOPAEDIC SURGERY

## 2022-03-28 PROCEDURE — 7100000001 HC PACU RECOVERY - ADDTL 15 MIN: Performed by: ORTHOPAEDIC SURGERY

## 2022-03-28 PROCEDURE — 99024 POSTOP FOLLOW-UP VISIT: CPT | Performed by: PHYSICIAN ASSISTANT

## 2022-03-28 PROCEDURE — 7100000000 HC PACU RECOVERY - FIRST 15 MIN: Performed by: ORTHOPAEDIC SURGERY

## 2022-03-28 PROCEDURE — C9290 INJ, BUPIVACAINE LIPOSOME: HCPCS | Performed by: ORTHOPAEDIC SURGERY

## 2022-03-28 PROCEDURE — 6370000000 HC RX 637 (ALT 250 FOR IP): Performed by: ANESTHESIOLOGY

## 2022-03-28 PROCEDURE — 6360000002 HC RX W HCPCS: Performed by: ANESTHESIOLOGY

## 2022-03-28 PROCEDURE — C1776 JOINT DEVICE (IMPLANTABLE): HCPCS | Performed by: ORTHOPAEDIC SURGERY

## 2022-03-28 PROCEDURE — 73501 X-RAY EXAM HIP UNI 1 VIEW: CPT

## 2022-03-28 PROCEDURE — 6360000002 HC RX W HCPCS: Performed by: PHYSICIAN ASSISTANT

## 2022-03-28 PROCEDURE — 7100000011 HC PHASE II RECOVERY - ADDTL 15 MIN: Performed by: ORTHOPAEDIC SURGERY

## 2022-03-28 PROCEDURE — 2780000010 HC IMPLANT OTHER: Performed by: ORTHOPAEDIC SURGERY

## 2022-03-28 PROCEDURE — 97116 GAIT TRAINING THERAPY: CPT

## 2022-03-28 PROCEDURE — 2500000003 HC RX 250 WO HCPCS: Performed by: ORTHOPAEDIC SURGERY

## 2022-03-28 PROCEDURE — 97535 SELF CARE MNGMENT TRAINING: CPT

## 2022-03-28 PROCEDURE — 97530 THERAPEUTIC ACTIVITIES: CPT

## 2022-03-28 PROCEDURE — 2500000003 HC RX 250 WO HCPCS

## 2022-03-28 PROCEDURE — 97165 OT EVAL LOW COMPLEX 30 MIN: CPT

## 2022-03-28 PROCEDURE — 2500000003 HC RX 250 WO HCPCS: Performed by: PHYSICIAN ASSISTANT

## 2022-03-28 PROCEDURE — 97110 THERAPEUTIC EXERCISES: CPT

## 2022-03-28 PROCEDURE — 2580000003 HC RX 258: Performed by: PHYSICIAN ASSISTANT

## 2022-03-28 PROCEDURE — 3700000001 HC ADD 15 MINUTES (ANESTHESIA): Performed by: ORTHOPAEDIC SURGERY

## 2022-03-28 DEVICE — SLEEVE FEM -3MM OFFSET HIP TYP 1 TAPR FOR CERAMIC BIOLOX: Type: IMPLANTABLE DEVICE | Site: HIP | Status: FUNCTIONAL

## 2022-03-28 DEVICE — HEAD FEM DIA36MM HIP BIOLOX DELT OPT FOR G7 ACET SYS: Type: IMPLANTABLE DEVICE | Site: HIP | Status: FUNCTIONAL

## 2022-03-28 DEVICE — TPRLC 133 FP TYPE1 PPS SO 6.0: Type: IMPLANTABLE DEVICE | Site: HIP | Status: FUNCTIONAL

## 2022-03-28 DEVICE — G7 VIT E NEUTRAL LNR 36MM F: Type: IMPLANTABLE DEVICE | Site: HIP | Status: FUNCTIONAL

## 2022-03-28 DEVICE — UPCHARGE HIP VITAMIN E LINER ZIMMER BIOMET: Type: IMPLANTABLE DEVICE | Status: FUNCTIONAL

## 2022-03-28 DEVICE — SHELL ACET SZ F DIA54MM 4 H OSSEOTI LIMIT H 2 MOBILITY G7: Type: IMPLANTABLE DEVICE | Site: HIP | Status: FUNCTIONAL

## 2022-03-28 RX ORDER — MORPHINE SULFATE 2 MG/ML
2 INJECTION, SOLUTION INTRAMUSCULAR; INTRAVENOUS
Status: CANCELLED | OUTPATIENT
Start: 2022-03-28

## 2022-03-28 RX ORDER — FENTANYL CITRATE 50 UG/ML
INJECTION, SOLUTION INTRAMUSCULAR; INTRAVENOUS PRN
Status: DISCONTINUED | OUTPATIENT
Start: 2022-03-28 | End: 2022-03-28 | Stop reason: SDUPTHER

## 2022-03-28 RX ORDER — ASPIRIN 81 MG/1
81 TABLET ORAL 2 TIMES DAILY
Qty: 60 TABLET | Refills: 0 | Status: SHIPPED | OUTPATIENT
Start: 2022-03-28

## 2022-03-28 RX ORDER — ONDANSETRON 2 MG/ML
4 INJECTION INTRAMUSCULAR; INTRAVENOUS EVERY 6 HOURS PRN
Status: CANCELLED | OUTPATIENT
Start: 2022-03-28

## 2022-03-28 RX ORDER — ONDANSETRON 2 MG/ML
INJECTION INTRAMUSCULAR; INTRAVENOUS PRN
Status: DISCONTINUED | OUTPATIENT
Start: 2022-03-28 | End: 2022-03-28 | Stop reason: SDUPTHER

## 2022-03-28 RX ORDER — SODIUM CHLORIDE 9 MG/ML
25 INJECTION, SOLUTION INTRAVENOUS PRN
Status: DISCONTINUED | OUTPATIENT
Start: 2022-03-28 | End: 2022-03-28 | Stop reason: HOSPADM

## 2022-03-28 RX ORDER — MEPERIDINE HYDROCHLORIDE 50 MG/ML
12.5 INJECTION INTRAMUSCULAR; INTRAVENOUS; SUBCUTANEOUS EVERY 5 MIN PRN
Status: DISCONTINUED | OUTPATIENT
Start: 2022-03-28 | End: 2022-03-28 | Stop reason: HOSPADM

## 2022-03-28 RX ORDER — OXYCODONE HYDROCHLORIDE 5 MG/1
5 TABLET ORAL EVERY 6 HOURS PRN
Qty: 28 TABLET | Refills: 0 | Status: SHIPPED | OUTPATIENT
Start: 2022-03-28 | End: 2022-04-04

## 2022-03-28 RX ORDER — VANCOMYCIN HYDROCHLORIDE 1 G/20ML
INJECTION, POWDER, LYOPHILIZED, FOR SOLUTION INTRAVENOUS PRN
Status: DISCONTINUED | OUTPATIENT
Start: 2022-03-28 | End: 2022-03-28 | Stop reason: ALTCHOICE

## 2022-03-28 RX ORDER — SODIUM CHLORIDE 0.9 % (FLUSH) 0.9 %
10 SYRINGE (ML) INJECTION EVERY 12 HOURS SCHEDULED
Status: CANCELLED | OUTPATIENT
Start: 2022-03-28

## 2022-03-28 RX ORDER — SODIUM CHLORIDE, SODIUM LACTATE, POTASSIUM CHLORIDE, CALCIUM CHLORIDE 600; 310; 30; 20 MG/100ML; MG/100ML; MG/100ML; MG/100ML
INJECTION, SOLUTION INTRAVENOUS CONTINUOUS
Status: CANCELLED | OUTPATIENT
Start: 2022-03-28

## 2022-03-28 RX ORDER — PROPOFOL 10 MG/ML
INJECTION, EMULSION INTRAVENOUS PRN
Status: DISCONTINUED | OUTPATIENT
Start: 2022-03-28 | End: 2022-03-28 | Stop reason: SDUPTHER

## 2022-03-28 RX ORDER — OXYCODONE HYDROCHLORIDE 5 MG/1
10 TABLET ORAL EVERY 4 HOURS PRN
Status: CANCELLED | OUTPATIENT
Start: 2022-03-28

## 2022-03-28 RX ORDER — CEFAZOLIN SODIUM 1 G/3ML
INJECTION, POWDER, FOR SOLUTION INTRAMUSCULAR; INTRAVENOUS PRN
Status: DISCONTINUED | OUTPATIENT
Start: 2022-03-28 | End: 2022-03-28 | Stop reason: ALTCHOICE

## 2022-03-28 RX ORDER — MELOXICAM 15 MG/1
15 TABLET ORAL DAILY
Qty: 30 TABLET | Refills: 0 | Status: SHIPPED | OUTPATIENT
Start: 2022-03-28 | End: 2022-08-26 | Stop reason: ALTCHOICE

## 2022-03-28 RX ORDER — SODIUM CHLORIDE 9 MG/ML
25 INJECTION, SOLUTION INTRAVENOUS PRN
Status: CANCELLED | OUTPATIENT
Start: 2022-03-28

## 2022-03-28 RX ORDER — ACETAMINOPHEN 325 MG/1
650 TABLET ORAL EVERY 6 HOURS
Status: CANCELLED | OUTPATIENT
Start: 2022-03-28

## 2022-03-28 RX ORDER — ONDANSETRON 8 MG/1
4 TABLET, ORALLY DISINTEGRATING ORAL EVERY 8 HOURS PRN
Status: CANCELLED | OUTPATIENT
Start: 2022-03-28

## 2022-03-28 RX ORDER — OXYCODONE HYDROCHLORIDE 5 MG/1
5 TABLET ORAL EVERY 4 HOURS PRN
Status: CANCELLED | OUTPATIENT
Start: 2022-03-28

## 2022-03-28 RX ORDER — SODIUM CHLORIDE 0.9 % (FLUSH) 0.9 %
10 SYRINGE (ML) INJECTION PRN
Status: DISCONTINUED | OUTPATIENT
Start: 2022-03-28 | End: 2022-03-28 | Stop reason: HOSPADM

## 2022-03-28 RX ORDER — SODIUM CHLORIDE 0.9 % (FLUSH) 0.9 %
10 SYRINGE (ML) INJECTION PRN
Status: CANCELLED | OUTPATIENT
Start: 2022-03-28

## 2022-03-28 RX ORDER — LIDOCAINE HYDROCHLORIDE 20 MG/ML
INJECTION, SOLUTION INFILTRATION; PERINEURAL PRN
Status: DISCONTINUED | OUTPATIENT
Start: 2022-03-28 | End: 2022-03-28 | Stop reason: SDUPTHER

## 2022-03-28 RX ORDER — OXYCODONE HYDROCHLORIDE 5 MG/1
5 TABLET ORAL PRN
Status: COMPLETED | OUTPATIENT
Start: 2022-03-28 | End: 2022-03-28

## 2022-03-28 RX ORDER — OXYCODONE HYDROCHLORIDE 5 MG/1
10 TABLET ORAL PRN
Status: COMPLETED | OUTPATIENT
Start: 2022-03-28 | End: 2022-03-28

## 2022-03-28 RX ORDER — METHYLPREDNISOLONE 4 MG/1
TABLET ORAL
Qty: 1 KIT | Refills: 0 | Status: SHIPPED | OUTPATIENT
Start: 2022-03-28 | End: 2022-08-26 | Stop reason: ALTCHOICE

## 2022-03-28 RX ORDER — DIPHENHYDRAMINE HYDROCHLORIDE 50 MG/ML
12.5 INJECTION INTRAMUSCULAR; INTRAVENOUS
Status: DISCONTINUED | OUTPATIENT
Start: 2022-03-28 | End: 2022-03-28 | Stop reason: HOSPADM

## 2022-03-28 RX ORDER — ONDANSETRON 2 MG/ML
4 INJECTION INTRAMUSCULAR; INTRAVENOUS
Status: DISCONTINUED | OUTPATIENT
Start: 2022-03-28 | End: 2022-03-28 | Stop reason: HOSPADM

## 2022-03-28 RX ORDER — CLINDAMYCIN HYDROCHLORIDE 300 MG/1
CAPSULE ORAL
Qty: 6 CAPSULE | Refills: 0 | Status: SHIPPED | OUTPATIENT
Start: 2022-03-28 | End: 2022-08-26 | Stop reason: ALTCHOICE

## 2022-03-28 RX ORDER — ROCURONIUM BROMIDE 10 MG/ML
INJECTION, SOLUTION INTRAVENOUS PRN
Status: DISCONTINUED | OUTPATIENT
Start: 2022-03-28 | End: 2022-03-28 | Stop reason: SDUPTHER

## 2022-03-28 RX ORDER — SODIUM CHLORIDE 0.9 % (FLUSH) 0.9 %
5-40 SYRINGE (ML) INJECTION EVERY 12 HOURS SCHEDULED
Status: DISCONTINUED | OUTPATIENT
Start: 2022-03-28 | End: 2022-03-28 | Stop reason: HOSPADM

## 2022-03-28 RX ORDER — SODIUM CHLORIDE 0.9 % (FLUSH) 0.9 %
5-40 SYRINGE (ML) INJECTION PRN
Status: DISCONTINUED | OUTPATIENT
Start: 2022-03-28 | End: 2022-03-28 | Stop reason: HOSPADM

## 2022-03-28 RX ORDER — MORPHINE SULFATE 4 MG/ML
4 INJECTION, SOLUTION INTRAMUSCULAR; INTRAVENOUS
Status: CANCELLED | OUTPATIENT
Start: 2022-03-28

## 2022-03-28 RX ORDER — SODIUM CHLORIDE 0.9 % (FLUSH) 0.9 %
10 SYRINGE (ML) INJECTION EVERY 12 HOURS SCHEDULED
Status: DISCONTINUED | OUTPATIENT
Start: 2022-03-28 | End: 2022-03-28 | Stop reason: HOSPADM

## 2022-03-28 RX ORDER — ONDANSETRON 4 MG/1
4 TABLET, FILM COATED ORAL 3 TIMES DAILY PRN
Qty: 15 TABLET | Refills: 0 | Status: SHIPPED | OUTPATIENT
Start: 2022-03-28 | End: 2022-08-26 | Stop reason: ALTCHOICE

## 2022-03-28 RX ORDER — CYCLOBENZAPRINE HCL 10 MG
10 TABLET ORAL 3 TIMES DAILY PRN
Qty: 30 TABLET | Refills: 0 | Status: SHIPPED | OUTPATIENT
Start: 2022-03-28 | End: 2022-04-07

## 2022-03-28 RX ORDER — DEXAMETHASONE SODIUM PHOSPHATE 4 MG/ML
INJECTION, SOLUTION INTRA-ARTICULAR; INTRALESIONAL; INTRAMUSCULAR; INTRAVENOUS; SOFT TISSUE PRN
Status: DISCONTINUED | OUTPATIENT
Start: 2022-03-28 | End: 2022-03-28 | Stop reason: SDUPTHER

## 2022-03-28 RX ORDER — LABETALOL HYDROCHLORIDE 5 MG/ML
10 INJECTION, SOLUTION INTRAVENOUS
Status: DISCONTINUED | OUTPATIENT
Start: 2022-03-28 | End: 2022-03-28 | Stop reason: HOSPADM

## 2022-03-28 RX ORDER — SODIUM CHLORIDE, SODIUM LACTATE, POTASSIUM CHLORIDE, CALCIUM CHLORIDE 600; 310; 30; 20 MG/100ML; MG/100ML; MG/100ML; MG/100ML
INJECTION, SOLUTION INTRAVENOUS CONTINUOUS
Status: DISCONTINUED | OUTPATIENT
Start: 2022-03-28 | End: 2022-03-28 | Stop reason: HOSPADM

## 2022-03-28 RX ADMIN — HYDROMORPHONE HYDROCHLORIDE 0.5 MG: 1 INJECTION, SOLUTION INTRAMUSCULAR; INTRAVENOUS; SUBCUTANEOUS at 10:18

## 2022-03-28 RX ADMIN — DEXAMETHASONE SODIUM PHOSPHATE 4 MG: 4 INJECTION, SOLUTION INTRAMUSCULAR; INTRAVENOUS at 07:38

## 2022-03-28 RX ADMIN — PROPOFOL 200 MG: 10 INJECTION, EMULSION INTRAVENOUS at 07:38

## 2022-03-28 RX ADMIN — Medication 1500 MG: at 07:38

## 2022-03-28 RX ADMIN — TRANEXAMIC ACID 1000 MG: 100 INJECTION, SOLUTION INTRAVENOUS at 07:51

## 2022-03-28 RX ADMIN — SODIUM CHLORIDE, SODIUM LACTATE, POTASSIUM CHLORIDE, AND CALCIUM CHLORIDE: .6; .31; .03; .02 INJECTION, SOLUTION INTRAVENOUS at 07:13

## 2022-03-28 RX ADMIN — ONDANSETRON 4 MG: 2 INJECTION INTRAMUSCULAR; INTRAVENOUS at 07:38

## 2022-03-28 RX ADMIN — SODIUM CHLORIDE, SODIUM LACTATE, POTASSIUM CHLORIDE, AND CALCIUM CHLORIDE: .6; .31; .03; .02 INJECTION, SOLUTION INTRAVENOUS at 08:28

## 2022-03-28 RX ADMIN — FENTANYL CITRATE 50 MCG: 50 INJECTION INTRAMUSCULAR; INTRAVENOUS at 08:59

## 2022-03-28 RX ADMIN — HYDROMORPHONE HYDROCHLORIDE 0.5 MG: 1 INJECTION, SOLUTION INTRAMUSCULAR; INTRAVENOUS; SUBCUTANEOUS at 10:37

## 2022-03-28 RX ADMIN — ROCURONIUM BROMIDE 50 MG: 10 SOLUTION INTRAVENOUS at 07:58

## 2022-03-28 RX ADMIN — LIDOCAINE HYDROCHLORIDE 100 MG: 20 INJECTION, SOLUTION INFILTRATION; PERINEURAL at 07:38

## 2022-03-28 RX ADMIN — FENTANYL CITRATE 100 MCG: 50 INJECTION INTRAMUSCULAR; INTRAVENOUS at 07:38

## 2022-03-28 RX ADMIN — HYDROMORPHONE HYDROCHLORIDE 0.5 MG: 1 INJECTION, SOLUTION INTRAMUSCULAR; INTRAVENOUS; SUBCUTANEOUS at 10:12

## 2022-03-28 RX ADMIN — FAMOTIDINE 20 MG: 10 INJECTION, SOLUTION INTRAVENOUS at 06:43

## 2022-03-28 RX ADMIN — FENTANYL CITRATE 50 MCG: 50 INJECTION INTRAMUSCULAR; INTRAVENOUS at 08:18

## 2022-03-28 RX ADMIN — OXYCODONE 10 MG: 5 TABLET ORAL at 10:42

## 2022-03-28 RX ADMIN — SUGAMMADEX 200 MG: 100 INJECTION, SOLUTION INTRAVENOUS at 09:35

## 2022-03-28 RX ADMIN — ROCURONIUM BROMIDE 50 MG: 10 SOLUTION INTRAVENOUS at 07:38

## 2022-03-28 ASSESSMENT — PULMONARY FUNCTION TESTS
PIF_VALUE: 21
PIF_VALUE: 22
PIF_VALUE: 21
PIF_VALUE: 24
PIF_VALUE: 20
PIF_VALUE: 3
PIF_VALUE: 22
PIF_VALUE: 21
PIF_VALUE: 20
PIF_VALUE: 22
PIF_VALUE: 21
PIF_VALUE: 21
PIF_VALUE: 22
PIF_VALUE: 14
PIF_VALUE: 21
PIF_VALUE: 21
PIF_VALUE: 3
PIF_VALUE: 22
PIF_VALUE: 20
PIF_VALUE: 22
PIF_VALUE: 20
PIF_VALUE: 4
PIF_VALUE: 22
PIF_VALUE: 9
PIF_VALUE: 1
PIF_VALUE: 21
PIF_VALUE: 21
PIF_VALUE: 16
PIF_VALUE: 21
PIF_VALUE: 20
PIF_VALUE: 21
PIF_VALUE: 21
PIF_VALUE: 20
PIF_VALUE: 21
PIF_VALUE: 22
PIF_VALUE: 20
PIF_VALUE: 28
PIF_VALUE: 20
PIF_VALUE: 21
PIF_VALUE: 21
PIF_VALUE: 20
PIF_VALUE: 47
PIF_VALUE: 21
PIF_VALUE: 14
PIF_VALUE: 21
PIF_VALUE: 21
PIF_VALUE: 22
PIF_VALUE: 21
PIF_VALUE: 22
PIF_VALUE: 31
PIF_VALUE: 2
PIF_VALUE: 20
PIF_VALUE: 21
PIF_VALUE: 20
PIF_VALUE: 21
PIF_VALUE: 20
PIF_VALUE: 21
PIF_VALUE: 2
PIF_VALUE: 21
PIF_VALUE: 21
PIF_VALUE: 23
PIF_VALUE: 22
PIF_VALUE: 22
PIF_VALUE: 14
PIF_VALUE: 21
PIF_VALUE: 21
PIF_VALUE: 20
PIF_VALUE: 22
PIF_VALUE: 21
PIF_VALUE: 21
PIF_VALUE: 14
PIF_VALUE: 20
PIF_VALUE: 15
PIF_VALUE: 31
PIF_VALUE: 20
PIF_VALUE: 20
PIF_VALUE: 21
PIF_VALUE: 17
PIF_VALUE: 20
PIF_VALUE: 25
PIF_VALUE: 20
PIF_VALUE: 21
PIF_VALUE: 3
PIF_VALUE: 22
PIF_VALUE: 20
PIF_VALUE: 1
PIF_VALUE: 21
PIF_VALUE: 21
PIF_VALUE: 20
PIF_VALUE: 20
PIF_VALUE: 1
PIF_VALUE: 20
PIF_VALUE: 2
PIF_VALUE: 21
PIF_VALUE: 22
PIF_VALUE: 20
PIF_VALUE: 3
PIF_VALUE: 5
PIF_VALUE: 20
PIF_VALUE: 2
PIF_VALUE: 21
PIF_VALUE: 20
PIF_VALUE: 21
PIF_VALUE: 20
PIF_VALUE: 21
PIF_VALUE: 21
PIF_VALUE: 20
PIF_VALUE: 2
PIF_VALUE: 17
PIF_VALUE: 15
PIF_VALUE: 2
PIF_VALUE: 21
PIF_VALUE: 23
PIF_VALUE: 21
PIF_VALUE: 22
PIF_VALUE: 21
PIF_VALUE: 20
PIF_VALUE: 36
PIF_VALUE: 21
PIF_VALUE: 21
PIF_VALUE: 20
PIF_VALUE: 21
PIF_VALUE: 21
PIF_VALUE: 20
PIF_VALUE: 1
PIF_VALUE: 19
PIF_VALUE: 21
PIF_VALUE: 21
PIF_VALUE: 4
PIF_VALUE: 20
PIF_VALUE: 21
PIF_VALUE: 21

## 2022-03-28 ASSESSMENT — PAIN SCALES - GENERAL
PAINLEVEL_OUTOF10: 6
PAINLEVEL_OUTOF10: 10
PAINLEVEL_OUTOF10: 9
PAINLEVEL_OUTOF10: 10
PAINLEVEL_OUTOF10: 7

## 2022-03-28 ASSESSMENT — PAIN DESCRIPTION - PAIN TYPE
TYPE: SURGICAL PAIN

## 2022-03-28 ASSESSMENT — PAIN DESCRIPTION - DESCRIPTORS
DESCRIPTORS: ACHING;CONSTANT
DESCRIPTORS: ACHING;CONSTANT

## 2022-03-28 ASSESSMENT — PAIN DESCRIPTION - ORIENTATION
ORIENTATION: LEFT
ORIENTATION: LEFT

## 2022-03-28 ASSESSMENT — PAIN - FUNCTIONAL ASSESSMENT
PAIN_FUNCTIONAL_ASSESSMENT: ACTIVITIES ARE NOT PREVENTED
PAIN_FUNCTIONAL_ASSESSMENT: 0-10

## 2022-03-28 ASSESSMENT — PAIN DESCRIPTION - FREQUENCY: FREQUENCY: CONTINUOUS

## 2022-03-28 ASSESSMENT — PAIN DESCRIPTION - LOCATION
LOCATION: HIP
LOCATION: HIP

## 2022-03-28 NOTE — PROGRESS NOTES
Patient ready for surgery. Left hip clipped and wiped with CHG wipes.  IS education completed, please see doc flow sheet for details  Yonis Bran RN

## 2022-03-28 NOTE — PROGRESS NOTES
Pt has been given discharge instructions and verbalizes understanding of the following: Dressing and incision care. Post op medications including when to take and possible side effects of the following medications: Anticoagulation, pain medication, and stool softeners. How to relieve pain and swelling through non- pharmacological comfort measures. When to call the office for questions or concerns, when to follow up with surgeon, and instructions on use of the incentive spirometer. Pt has worked with physical therapy and has received a list of exercises and has discussed precautions. Pt has been educated on use of ambulation aides, bed mobility, fall safety, bathing safety, and when to start physical therapy. Pt has been educated on signs and symptoms of infection, inadequate pain control, and when and who to call for changes in condition.    Emmanuel Perkins RN

## 2022-03-28 NOTE — PROGRESS NOTES
Occupational Therapy   Occupational Therapy Initial Assessment/Discharge Summary  Date: 3/28/2022   Patient Name: Wyatt Almeida MRN: [id number]     : 1985    Date of Service: 3/28/2022    Discharge Recommendations:  24 hour supervision or assist  OT Equipment Recommendations  Equipment Needed: No    Assessment   Performance deficits / Impairments: Decreased functional mobility ; Decreased ADL status; Decreased high-level IADLs;Decreased endurance  Assessment: Pt tolerated OT evaluation well. Prior to surgery pt ambulated without AD, required Min A for LB dressing due to pain and managed all other IADLs Mod I to [place] with modifications to manage L hip pain. OT provided training this date on I/ADLs, bathroom transfers, car transfers, pain management strategies to implement following L LIZETT. Following OT training pt able to complete LB dressing with Min A, all other ADLs SBA-Mod I, functional transfers with supervision-Mod I, functional ambulation Supervision-Mod I using RW. Pt able to verbalize strategies for pain and swelling management to implement at home following OT training. Pt is safe to return home with initital  supervision/assist. Wife and mother available to provide assitance as needed. No further acute OT services warranted at this time. Prognosis: Good  Decision Making: Low Complexity    OT Education: OT Role;Transfer Training;Equipment;Plan of Care;IADL Safety;Precautions; ADL Adaptive Strategies; Family Education  Patient Education: home safety concerns, OT discharge recommendations, hand placement during transfers, WIS transfers, toilet transfers, compensatory LB dressing strategies, safety with bathing, car transfer training, RICE acronym for pain management, safe item transportation strategies, avoid rotation on LLE when turning directions with RW, no SLR LLE  Barriers to Learning: pt and wife verbalize understanding, pt demonstrates understanding.   REQUIRES OT FOLLOW UP: No    Activity Tolerance  Activity Tolerance: Patient Tolerated treatment well  Activity Tolerance: Vitals seated EOB: 174/106, 96bpm, 97% SpO2 on RA; RN made aware of vitals  Safety Devices  Safety Devices in place: Yes  Type of devices: Nurse notified;Call light within reach; Left in bed           Patient Diagnosis(es): The encounter diagnosis was Primary osteoarthritis of left hip.     has a past medical history of Arthritis, H/O degenerative disc disease, Hypertension, Osteoarthritis, and Perthes disease. has a past surgical history that includes hip surgery (Right, 1995). Restrictions  Restrictions/Precautions  Restrictions/Precautions: Weight Bearing,General Precautions,Surgical Protocols  Lower Extremity Weight Bearing Restrictions  Left Lower Extremity Weight Bearing: Weight Bearing As Tolerated  Position Activity Restriction  Other position/activity restrictions: No SLR with LLE, WBAT    Subjective   General  Chart Reviewed: Yes  Patient assessed for rehabilitation services?: Yes  Additional Pertinent Hx: OA, DDD, Perthes disease, R hip surgery 1995  Family / Caregiver Present: Yes (wife)    Diagnosis: Pt with OA L hip, L hip dysplasia, L hip Perthes disease. Pt underwent direct anterior L LIZETT on 3/28/22 by Dr. Narinder Morelos    Subjective  Subjective: Pt supine in bed upon OT arrival. Pt reports that he is feeling ok and hopes to discharge home soon. Pt reports L hip pain at baseline that would increase to 10/10 with activity. Pt reprots L hip pain 6/10 currently  General Comment  Comments: RN agreeable to OT evaluation.     Patient Currently in Pain: Yes  Pain Assessment  Pain Assessment: 0-10  Pain Level: 6  Patient's Stated Pain Goal: 3  Pain Type: Surgical pain  Pain Location: Hip  Pain Orientation: Left  Pain Descriptors: Aching;Constant  Pain Frequency: Continuous  Functional Pain Assessment: Activities are not prevented  Non-Pharmaceutical Pain Intervention(s): Cold applied;Elevation;Distraction  Response to Pain Intervention: Patient Satisfied  Multiple Pain Sites: No  Pre Treatment Pain Screening  Intervention List: Patient able to continue with treatment;Nurse/Physician notified    Vital Signs  Pulse: 96  Heart Rate Source: Monitor  BP: (!) 174/106  BP Location: Left upper arm  Patient Position: Sitting  Level of Consciousness: Alert (0)  Patient Currently in Pain: Yes  Oxygen Therapy  SpO2: 97 %  Pulse Oximeter Device Mode: Intermittent  Pulse Oximeter Device Location: Finger  O2 Device: None (Room air)    Social/Functional History  Social/Functional History  Lives With: Spouse  Type of Home: House  Home Layout: Two level,Able to Live on Main level with bedroom/bathroom  Home Access: Stairs to enter with rails  Entrance Stairs - Number of Steps: 8 SOLEDAD with 1 handrail  Entrance Stairs - Rails: Left  Bathroom Shower/Tub: Walk-in shower  Bathroom Toilet: Standard  Bathroom Equipment: Shower chair  Home Equipment: Crutches,Rolling walker  ADL Assistance: Independent  Homemaking Assistance: Independent  Ambulation Assistance: Independent  Transfer Assistance: Independent  Active : Yes  Occupation: Full time employment  Type of occupation:   Leisure & Hobbies: Fishing, hunting  Additional Comments: pt required occassional assistance to don/doff socks and shoes prior to surgery due to pain.  Wife and mother able to provide 24/7 supervision/assist.       Objective   Vision: Impaired  Vision Exceptions: Wears glasses at all times  Hearing: Within functional limits      Orientation  Overall Orientation Status: Within Normal Limits    Observation/Palpation  Posture: Good  Balance  Sitting Balance: Independent  Standing Balance: Supervision (Supervision-Mod I using RW with min cues to avoid rotation on LLE during ambulatio)  Functional Mobility  Functional - Mobility Device: Rolling Walker  Activity: Other  Assist Level: Supervision  Functional Mobility Comments: Supervision to Mod I using RW with fading verbal cues to avoid rotation on LLE when turning direction    Toilet Transfers  Toilet - Technique: Ambulating  Equipment Used: Standard toilet  Toilet Transfer: Supervision  Toilet Transfers Comments: Supervision with RW for approach and UE support on counter. Min cues for hand placement and management of RW for approach. Shower Transfers  Shower - Transfer From: Jocelynn [place] - Transfer Type: To and From  Shower - Transfer To: Shower seat with back  Shower - Technique: Ambulating  Shower Transfers: Supervision  Shower Transfers Comments: Supervision with min cues for backward entry/forward exit, walker management, sequencing BLE, hand placement. ADL  Feeding: Independent  Grooming: Independent  LE Dressing: Minimal assistance (doff/don pants, socks, slip on shoes with partial assist for L sock only. Wife available to assist as needed at home)  Toileting: Modified independent  (Mod I following OT training)     Tone RUE  RUE Tone: Normotonic  Tone LUE  LUE Tone: Normotonic  Coordination  Movements Are Fluid And Coordinated: Yes       Bed mobility  Supine to Sit: Supervision  Sit to Supine: Supervision     Transfers  Stand Step Transfers: Supervision  Sit to stand: Supervision  Stand to sit: Supervision  Transfer Comments: Supervision-Mod I with fading cues for hand placement and LLE placement with use of RW     Vision - Basic Assessment  Prior Vision: Wears glasses all the time  Visual History: No significant visual history  Patient Visual Report: No visual complaint reported. Visual Field Cut: No  Oculo Motor Control: WNL    Cognition  Overall Cognitive Status: WNL        Sensation  Overall Sensation Status: WFL        LUE AROM (degrees)  LUE AROM : WNL  RUE AROM (degrees)  RUE AROM : WNL     LUE Strength  Gross LUE Strength: WNL  L Hand General: 5/5  LUE Strength Comment: grossly 5/5  RUE Strength  Gross RUE Strength:  WNL  R Hand General: 5/5  RUE Strength Comment: grossly 5/5        Car Transfers  Car - Transfer From: Walker  Car - Technique: Ambulating  Car Transfers: Supervision  Car Transfers: supervision simulated car transfer using RW for approach, and sit pivot with min cues for walker management, hand placement, and avoiding SLR LLE when moving LLE in/out of car. Patient Educated in safety with car transfers and  dispensed instruction on car transfers with use of assistive device with patient demonstrating:  [x] Verbalizing understanding of appropriate technique for car transfer training s/p TJR  [x] [place] with simulated car transfer with walker  [x] He/she requires stand by assist and will have someone at discharge to help with transfers with patient verbalizing understanding of appropriate technique. Wife present for car transfer education and verbalizes understanding. [] Other:            Plan   Plan  Times per week: 1 visit only; No further OT services warranted at this time.       AM-PAC Score        AM-PAC Inpatient Daily Activity Raw Score: 22 (03/28/22 1414)  AM-PAC Inpatient ADL T-Scale Score : 47.1 (03/28/22 1414)  ADL Inpatient CMS 0-100% Score: 25.8 (03/28/22 1414)  ADL Inpatient CMS G-Code Modifier : CJ (03/28/22 1414)    Goals  Short term goals  Time Frame for Short term goals: 1 visit  Short term goal 1: SBA toilet transfers; GOAL MET 3/28/22, Supervision-Mod I using RW for approach  Short term goal 2: SBA LB dressing; 3/28/22 Min A to doff/don pants, socks, slip on shoes-Wife available to assist as needed at home  Short term goal 3: SBA car transfers: GOAL MET 3/28/22, SBA simulated car transfer using RW for approach  Short term goal 4: Pt to verbalize understanding of RICE acroynm for pain and swelling management at home; GOAL MET 3/28/22, pt able to verbalize rest, ice, compression, elevate as strategies for pain/swelling management by end of OT session following education  Short term goal 5: SBA WIS transfers; GOAL MET 3/28/22 SBA simulated WIS transfer using RW for backward entry/forward exit  Patient Goals   Patient goals : \"to get to and from the toilet on my own\" GOAL MET 3/28/22, Mod I following OT training on toilet transfers using RW for approach. Therapy Time   Individual Concurrent Group Co-treatment   Time In 1300         Time Out 1335         Minutes 35         Timed Code Treatment Minutes: 20 Minutes (15 minute evaluation)     If pt is discharged prior to next OT session, this note will serve as the discharge summary.     Gaviota Aparicio, OT

## 2022-03-28 NOTE — PROGRESS NOTES
Physical Therapy    Facility/Department: [facility] OR  Initial Assessment/Discharge Summary (1x only)    NAME: Irene Yates. : 1985  MRN: [id number]    Date of Service: 3/28/2022    Discharge Recommendations:  24 hour supervision or assist,Outpatient PT (initial 24-hr sup/assist from family for safety, OP PT per ortho recs)   PT Equipment Recommendations  Equipment Needed: No  Other: Pt already has RW and crutches for home use    Assessment   Body structures, Functions, Activity limitations: Decreased functional mobility ; Decreased ROM; Decreased strength; Increased pain  Assessment: Pt referred for PT evaluation during current hospital stay with dx of L hip OA, s/p L anterior THR on 3/28/22. Pt participated very well in POD #0 therapy evaluation, ambulating distances of 75-80 feet with RW and CGA/SBA x 1. Able to navigate curb step safely with RW with cues for proper sequencing. Pt well-versed in HEP for L hip ROM/strengthening and technique for car transfer. Recommend pt return home with 24-hr sup/assist initially for safety and OP PT per ortho recs. No further acute PT needs at this time; will sign off. Treatment Diagnosis: Decreased ROM/strength L hip s/p anterior L THR  Prognosis: Excellent  Decision Making: Low Complexity  PT Education: Goals; General Safety;Gait Training;PT Role;Plan of Care;Disease Specific Education; Functional Mobility Training;Precautions;Transfer Training;Weight-bearing Education;Equipment;Home Exercise Program;Family Education  Patient Education: Disease-specific education: Pt educated on role of PT, benefits of mobility, safety in transfers/amb with RW, WBAT LLE, anterior hip precaution, HEP, technique for car transfer, D/C recs - pt verbalizes understanding. No Skilled PT: Safe to return home  REQUIRES PT FOLLOW UP: No  Activity Tolerance: Patient Tolerated treatment well  Activity Tolerance: Vitals post-amb on room air: BP = 170/106, HR = 99 bpm, O2 sat = 96%. Patient Diagnosis(es): The encounter diagnosis was Primary osteoarthritis of left hip.     has a past medical history of Arthritis, H/O degenerative disc disease, Hypertension, Osteoarthritis, and Perthes disease. has a past surgical history that includes hip surgery (Right, 1995). Restrictions  Restrictions/Precautions  Restrictions/Precautions: Weight Bearing,General Precautions,Surgical Protocols  Lower Extremity Weight Bearing Restrictions  Left Lower Extremity Weight Bearing: Weight Bearing As Tolerated  Position Activity Restriction  Other position/activity restrictions: No SLR with LLE, WBAT  Vision/Hearing  Vision: Within Functional Limits  Hearing: Within Functional Limits  Subjective  General  Chart Reviewed: Yes  Patient assessed for rehabilitation services?: Yes  Family / Caregiver Present: Yes (wife)  Referring Practitioner: Dr. Adilia Do and Arielle Bowens PA-C  Referral Date : 03/28/22  Diagnosis: L hip OA, s/p L anterior THR on 3/28/22  Follows Commands: Within Functional Limits  General Comment  Comments: Pt resting in bed upon entry of PT, evaluated in PACU  Subjective: Pt agreeable to work with PT this afternoon, pleasant and cooperative. Eager to hopefully D/C home soon.   Pain Screening  Patient Currently in Pain: Yes (\"7/10\")  Pain Type: Surgical  Pain Location: Hip  Pain Orientation: Left  Pain Descriptors: Aching, Sore, Intermittent  Post-Treatment Pain Interventions: Repositioned, Ambulation/increased activity, Emotional support    Orientation  Orientation  Overall Orientation Status: Within Normal Limits     Social/Functional History  Social/Functional History  Lives With: Spouse  Type of Home: House  Home Layout: Two level,Able to Live on Main level with bedroom/bathroom  Home Access: Stairs to enter with rails  Entrance Stairs - Number of Steps: 8 SOLEDAD with 1 handrail  Entrance Stairs - Rails: Left  Bathroom Shower/Tub: Walk-in shower  Bathroom Toilet: Standard  Home Equipment: Crutches,Rolling walker  ADL Assistance: Independent  Homemaking Assistance: Independent  Ambulation Assistance: Independent  Transfer Assistance: Independent  Active : Yes  Occupation: Full time employment  Type of occupation:   Leisure & Hobbies: Fishing, hunting  Additional Comments: pt required occassional assistance to don/doff socks and shoes prior to surgery due to pain    Objective  Observation/Palpation  Posture: Good    AROM RLE (degrees)  RLE AROM: WFL  AROM LLE (degrees)  LLE AROM : WFL (slightly decreased in hip following recent sx)  Strength RLE  Strength RLE: WFL  Strength LLE  Comment: WFL ankle, NT formally in hip/knee due to recent surgery (appears at least 3+/5 in knee through observation)     Bed mobility  Supine to Sit: Supervision  Sit to Supine: Supervision  Scooting: Supervision (to scoot up in bed)     Transfers  Sit to Stand: Stand by assistance  Stand to sit: Stand by assistance  Bed to Chair: Stand by assistance (moving from bed<>chair, using RW)     Ambulation  WB Status: WBAT LLE  Surface: level tile  Device: Rolling Walker  Assistance: Contact guard assistance;Stand by assistance (CGA/close SBA x 1)  Quality of Gait: Pt amb with slow jacky using step-to pattern initially, increasing to partial step-through pattern with repetition. Mild L knee instability at first although muscle strength and balance improved with repetition. Gait Deviations: Slow Jacky;Decreased step length;Decreased step height  Distance: x 80 feet, x 20 feet    Stairs/Curb  # Steps : 1  Rails: None  Curbs: 6\"  Device: No Device  Assistance: Contact guard assistance  Comment: Pt amb up/down 6\" step using RW for support. Min verbal/visual cues needed for sequencing.     Balance  Posture: Good  Sitting - Static: Good  Sitting - Dynamic: Good  Standing - Static: Good;-  Standing - Dynamic: Good;-     Exercises  Quad Sets: x 10 BLE  Heelslides: x 10 LLE ([place] needed during 2-3 reps)  Gluteal Sets: x 10 bilat  Knee Long Arc Quad: x 10 LLE at EOB (I)  Knee Short Arc Quad: x 10 LLE (I)  Ankle Pumps: x 15 BLE   Comments: Pt issued HEP for above exercises as well as handout for car transfer technique. Pt verbalizes/demonstrates understanding of technique for car transfer while managing RW. Plan   Plan: D/C from acute PT services  Safety Devices: All fall risk precautions in place,Call light within reach,Nurse notified,Gait belt,Left in bed    AM-PAC Score  AM-PAC Inpatient Mobility Raw Score : 21 (03/28/22 1335)  AM-PAC Inpatient T-Scale Score : 50.25 (03/28/22 1335)  Mobility Inpatient CMS 0-100% Score: 28.97 (03/28/22 1335)  Mobility Inpatient CMS G-Code Modifier : CJ (03/28/22 1335)    Goals  Short term goals  Time Frame for Short term goals: 1 visit - 3/28/22  Short term goal 1: Pt will transfer supine <-> sit with SBA - MET 3/28/22  Short term goal 2: Pt will transfer sit <-> stand with SBA - MET 3/28/22  Short term goal 3: Pt will ambulate x 75 feet using RW with CGA/SBA x 1 - MET 3/28/22  Short term goal 4: Pt will ambulate up/down 6\" step using RW with CGA/SBA x 1 - MET 3/28/22  Short term goal 5: Pt will demonstrate understanding of HEP for L hip ROM/strengthening and anterior hip precaution - MET 3/28/22  Patient Goals   Patient goals :  \"To be able to walk around my house (distances of ~50 feet) using my walker or crutches without help from my family (SBA)\" - MET 3/28/22       Therapy Time   Individual Concurrent Group Co-treatment   Time In 1211         Time Out 1300         Minutes 49         Timed Code Treatment Minutes: [place] [facility] [place], [place] #[id number]

## 2022-03-28 NOTE — ANESTHESIA POSTPROCEDURE EVALUATION
Department of Anesthesiology  Postprocedure Note    Patient: Kendall Savage. MRN: [id number]  YOB: 1985  Date of evaluation: 3/28/2022  Time:  6:35 PM     Procedure Summary     Date: 03/28/22 Room / Location: [address] [place] [address]  / Brad    Anesthesia Start: 2139 Anesthesia Stop: 7003    Procedure: LEFT TOTAL HIP ARTHROPLASTY, MINIMALLY INVASIVE DIRECT ANTERIOR WITH FASCIAILIACA BLOCK FOR PAIN CONTROL               Azul Kelley (Left ) Diagnosis:       Primary osteoarthritis of left hip      (LEFT HIP PRIMARY OSTEOARTHRITIS)    Surgeons: Eva Geller MD Responsible Provider: Zion Shah MD    Anesthesia Type: general ASA Status: 2          Anesthesia Type: general    Jayla Phase I: Jayla Score: 9    Jayla Phase II: Jayla Score: 10    Last vitals: Reviewed and per EMR flowsheets. Anesthesia Post Evaluation    Patient location during evaluation: PACU  Patient participation: complete - patient participated  Level of consciousness: awake and alert  Airway patency: patent  Nausea & Vomiting: no nausea and no vomiting  Complications: no  Cardiovascular status: blood pressure returned to baseline  Respiratory status: acceptable  Hydration status: euvolemic  Comments: VSS on transfer to phase 2 recovery. No anesthetic complications.

## 2022-03-28 NOTE — ANESTHESIA PRE PROCEDURE
Department of Anesthesiology  Preprocedure Note       Name:  Cathryn Thorpe. Age:  29 y.o.  :  1985                                          MRN:  [id number]         Date:  3/28/2022      Surgeon: Liseth Hernandez): Tamar Velasquez MD    Procedure: Procedure(s):  LEFT TOTAL HIP ARTHROPLASTY, MINIMALLY INVASIVE DIRECT ANTERIOR WITH FASCIAILIACA BLOCK FOR PAIN CONTROL               ANDREA BIOMET    Medications prior to admission:   Prior to Admission medications    Medication Sig Start Date End Date Taking? Authorizing Provider   mupirocin (BACTROBAN) 2 % ointment Apply twice daily to each nare for 5 days prior to surgical procedure 22   Tamar Velasquez MD   hydroCHLOROthiazide (HYDRODIURIL) 25 MG tablet Take 1 tablet by mouth every morning 22   LYDIA Hyatt   lisinopril (PRINIVIL;ZESTRIL) 10 MG tablet Take 1 tablet by mouth daily 22   LYDIA Hyatt   famotidine (PEPCID) 10 MG tablet Take 10 mg by mouth 2 times daily as needed    Historical Provider, MD       Current medications:    Current Facility-Administered Medications   Medication Dose Route Frequency Provider Last Rate Last Admin    tranexamic acid (CYKLOKAPRON) 1,000 mg in dextrose 5 % 100 mL IVPB  1,000 mg IntraVENous On Call to [address], PA-C        vancomycin 1500 mg in dextrose 5% 300 mL IVPB  1,500 mg IntraVENous On Call to [address], PA-C        lactated ringers infusion   IntraVENous Continuous Eamon Sawant MD        sodium chloride flush 0.9 % injection 10 mL  10 mL IntraVENous 2 times per day Eamon Sawant MD        sodium chloride flush 0.9 % injection 10 mL  10 mL IntraVENous PRN Eamon Sawant MD        0.9 % sodium chloride infusion  25 mL IntraVENous PRN Eamon Sawant MD           Allergies: Allergies   Allergen Reactions    Cefepime Itching and Swelling     Lip swelling.          Problem List:    Patient Active Problem List   Diagnosis Code  Class 2 obesity due to excess calories without serious comorbidity with body mass index (BMI) of 36.0 to 36.9 in adult E66.09, Z68.36    Gastroesophageal reflux disease K21.9    Essential hypertension I10    Discoloration of nail L60.8    Femoral acetabular impingement M25.859    Primary osteoarthritis of left hip M16.12    Hip pain, left M25.552    Osteoarthritis of left hip M16.12       Past Medical History:        Diagnosis Date    Arthritis     H/O degenerative disc disease     Hypertension     Osteoarthritis     Perthes disease 1995    RIGHT HIP       Past Surgical History:        Procedure Laterality Date    HIP SURGERY Right 1995       Social History:    Social History     Tobacco Use    Smoking status: Former Smoker     Packs/day: 0.25     Types: Cigarettes     Quit date: 2022     Years since quittin.1    Smokeless tobacco: Current User     Types: Snuff   Substance Use Topics    Alcohol use:  Yes     Alcohol/week: 10.0 standard drinks     Types: 10 Cans of beer per week     Comment: 3-4 X WEEKLY                                Ready to quit: Not Answered  Counseling given: Not Answered      Vital Signs (Current):   Vitals:    22 1026 22 0648   BP:  (!) 149/85   Pulse:  75   Resp:  18   Temp:  97.6 °F (36.4 °C)   TempSrc:  Temporal   SpO2:  97%   Weight: 230 lb (104.3 kg) 237 lb 3.2 oz (107.6 kg)   Height: 5' 7\" (1.702 m) 5' 7\" (1.702 m)                                              BP Readings from Last 3 Encounters:   22 (!) 149/85   22 135/78   22 112/70       NPO Status: Time of last liquid consumption:                         Time of last solid consumption:                         Date of last liquid consumption: 22                        Date of last solid food consumption: 22    BMI:   Wt Readings from Last 3 Encounters:   22 237 lb 3.2 oz (107.6 kg)   22 237 lb (107.5 kg)   22 233 lb (105.7 kg)     Body mass operative care and possible ultrasound guided fascia iliaca compartment nerve block for post operative analgesia discussed with pt. All questions answered. Willing to proceed.)  Induction: intravenous. Anesthetic plan and risks discussed with patient and spouse.                       Dixie Avalos MD   3/28/2022

## 2022-03-28 NOTE — PROGRESS NOTES
Patient in [facility] room 4. Patient is A/O X 4, states his pain is an 5/10, tolerable for him. No nausea. Left hip surgical dressing C/D/I. He is tolerating PO.  Wife at bedside  Tonya Reyes RN

## 2022-03-28 NOTE — OP NOTE
Orthopaedic Surgery  Operative Report      Patient Name:  Wyatt Rizo. Patient :  1985  MRN: [id number]    Date: 22     Pre-operative Diagnosis:   M16.12 Left hip primary osteoarthritis   Left hip dysplasia  Left hip Perthes disease    Post-operative Diagnosis:    Same    Procedure: LEFT  70470 Total Hip Arthroplasty, direct anterior  Modifier 22    Surgeon:  Surgeon(s) and Role:     * Roselyn Sierra MD - Primary    Assistant: Circulator: Mariano Lomeli RN  Surgical Assistant: Fabrizio Calvillo  Radiology Technologist: Kylah Retana Circulator: Yaima Martinez RN  Scrub Person First: Gaudenciojagdish Rizo    Anesthesia: General endotracheal anesthesia and Intraoperative local infiltration - Exparel/marcaine solution    Estimated blood loss: 300    Specimens: * No specimens in log *    Complications: None    Drains: None    Condition: Stable    Implants:   Implant Name Type Inv. Item Serial No.  Lot No. LRB No. Used Action   G7 VIT E NEUTRAL LNR 36MM F - [id number]  G7 VIT E NEUTRAL LNR 36MM F  [employer] [facility] [id number] Left 1 Implanted   SHELL ACET SZ F [id number] 4 H OSSEOTI LIMIT H 2 MOBILITY G7 - [id number]  SHELL ACET SZ F [id number] 4 H OSSEOTI LIMIT H 2 MOBILITY G7  [employer]- [id number] Left 1 Implanted   TPRLC 133 FP TYPE1 PPS SO 6.0 - [id number]  TPRLC 133 FP TYPE1 PPS SO 6.0  [employer]- [id number] Left 1 Implanted   SLEEVE FEM -3MM OFFSET HIP TYP 1 TAPR FOR CERAMIC BIOLOX - [id number]  SLEEVE FEM -3MM OFFSET HIP TYP 1 TAPR FOR CERAMIC BIOLOX  [employer]- [id number] Left 1 Implanted   HEAD FEM [id number] HIP BIOLOX DELT OPT FOR G7 ACET SYS - [id number]  HEAD FEM [id number] HIP BIOLOX DELT OPT FOR G7 ACET SYS  [employer]- [id number] Left 1 Implanted       Findings:   1. End stage OA  2. History of Perthes disease bilateral  3. Significant preoperative limb length inequality, left actually longer than right  4. Perthes disease is more severe on right but remains less symptomatic, the left side preop was 2 - 2.5 cm longer than his right    Indications: The patient has been battling left hip pain for months to years. Pain has gotten worse recently. Patient has failed all preoperative conservative treatment options. The activities of daily living have been affected quite a bit. Patient wanted to regain mobility and be active as possible. Patient understood the risk benefits and alternatives in detail and wanted to proceed with the above operation. Procedure Details:   I marked the surgical site of the left hip for surgery. He was taken back to the operating theater laid supine the table the bony prominences well-padded. General anesthesia was induced. We transferred the patient to the operating table, [place] bed. X-ray was acquired marking the left hip as the preoperative templating hip. Antibiotics 2 g of Ancef were given. MRSA swab testing was performed preop, and no additional antibiotics were required. Tranexamic acid 1 g was given at start. We began with a direct anterior approach of the hip. Cross-Bowman interval was taken. We incised the tensor fascia george. We bluntly developed a plane between that and the rectus. Lateral edge of the rectus fascia was incised the muscle belly was retracted medially. The underlying fascia was also incised. Underlying vessels lateral circumflex were tied off on each end with silk suture. Electro Bovie cautery was then used to incise through the capsule. A femoral neck osteotomy was performed based on preoperative template. Using a corkscrew device we removed the existing head ball. We then placed retractors around the acetabulum. I cleaned out the pelvic tissue as well as the existing labrum. Sequential reaming was then performed. We stopped at the appropriately sized reamer and impacted the real acetabular shell.    X-ray confirmed that the socket was in acceptable position based off of a good AP pelvis x-ray. Standard poly liner was used. We then turned our attention to the femoral exposure. The [place] table femoral elevating hook was then placed just inside the fascia but lateral to the vastus. This went around the posterior edge of the femur. Leg was then dropped to the floor and abducted. 90 degrees of external rotation was achieved. We then performed small capsulotomy posteriorly to place a 1 #1 retractor. Placed a #3 Samreen retractor medially. I use the table hook to elevate the femur for exposure. I externally rotated more to deliver the femoral neck via the [place] table. The femur was prepped using a box chisel, canal finder and sequential broaching only. We are happy with the appropriately sized stem. Trial was then placed with a -3 head ball first.  The hip was then reduced using standard technique. X-ray confirmed the implants were in reasonable position. We then redislocated and remove the existing trial and implanted the real stem femoral component.  -3 head ball was then inserted and impacted. Hip reduction was then performed. We performed anterior and posterior hip stability checks which were successful. We also performed shuck test which is very acceptable with the existing tension. We performed sequential closure. I irrigated the wound thoroughly with 3 L normal saline. I placed 2 g of vancomycin powder around the wound. I also injected a mixture of Marcaine and Exparel into the perioperative field. Adequate hemostasis was achieved. #2 Ethibond approximated the capsular leaflets. #2 Quill suture approximated the fascial layer as well as the deep subcutaneous layer to remove dead space. 2-0 Vicryl interrupted sutures closed the subcutaneous tissue layer. Monocryl subcuticular suture was then applied. Dermabond Prineo was then used with a nonadhesive dressing.   Patient then was reversed in general esthesia transferred back the postoperative care unit without any complications. All instrument counts were correct x2. I was present throughout the entire to the case with the exception of skin closure. Modifier 22: Increased time and complexity  This case took approximately 25% more time than a standard hip replacement. He has hip dysplasia in the setting of Legg-calf-perthes disease. Indwelling anatomy was more altered than usual hip replacement. Careful consideration was placed with socket position as well as femoral version. This, in no way, signifies that the ultimate outcome was compromised in any way    PLAN:  - WBAT with assist device  - aspirin 81 mg BID  - ambulate postop with PT  - resume home meds, diet  - f/u scheduled with me in 2-3 weeks  - dispo:  For discharge today, therapy in PACU      Electronically signed by Malena Sharp MD on 3/28/2022 at 9:38 AM

## 2022-03-28 NOTE — PROGRESS NOTES
Patient voided large amount of urine. Left anterior surgical hip dressing C/D/I. Patient wheeled to families car by this RN.    Emmanuel Perkins RN

## 2022-03-30 ENCOUNTER — TELEPHONE (OUTPATIENT)
Dept: ORTHOPEDIC SURGERY | Age: 37
End: 2022-03-30

## 2022-03-30 NOTE — DISCHARGE SUMMARY
Department of Orthopedic Surgery  Physician Assistant   Discharge Summary    The Alvaro Isabel is a 29 y.o. male admitted for left hip osteoarthritis. Alvaro Isabel was admitted to the floor following His recovery in the PACU. Discharge Diagnosis  left total hip arthroplasty    Current Inpatient Medications    No current facility-administered medications for this encounter. Post-operatively the patients diet was advanced as tolerated and their dressing was changed on POD #1. The incision is dressing in place, clean, dry and intact with no signs of infection. The patient remained neurovascularly intact in the left lower and had intact pulses distally. Patients calf remained soft and showed no evidence of DVT. The patient was able to move their left lower extremity without any problems post-operatively. Physical therapy and occupational therapy were consulted and began working with the patient post-operatively. The patient progressed with PT/OT as would be expected and continued to improve through their stay. The patients pain was initially controlled with IV medications but we were able to transition to oral pain medications soon after arrival to the floor and their pain remained under good control through their hospital stay. From a medical standpoint the patient remained stable and continued to have the medicine team follow throughout their stay. The patient will be discharged at this time to Home  with their current diet restrictions and will continue to follow the precautions outlined to them by us and PT/OT. Condition on Discharge: Stable    Plan  Return visit in 2 weeks. .  Patient was instructed on the use of pain medications, the signs and symptoms of infection, and was given our number to call should they have any questions or concerns following discharge.     For opioid prescriptions given at discharge the following statement is provided for compliance with [employer] rules. Patient being given increased dosage/quantity of opoid pain medication in excess of [employer] guidelines which noted a 30 MED daily of opioids due to the fact that he/she has undergone major orthopaedic surgery as outlined in rule 4731-11-13. Dosages and further duration of the pain medication will be re-evaluated at her post op visit in 2 weeks. Patient was educated on dosing expectations and limits of prescribing as a result of the new [facility] Board rules enacted August 31, 2017. Please also note that this is not the initial opoid prescription issued to this patient but a continuation of medication utilized during the hospital admission as noted in the medical record. [place] report has also been utilized to screen for any abuse history or suspicious activity as outlined in [place]. All efforts have been taken to prevent abuse potential and misuse of opioid medications including education, screening, and close clinical follow up.

## 2022-03-30 NOTE — TELEPHONE ENCOUNTER
Spoke with pt and spouse. They noticed a leg length difference, spoke to pt about how this normal.     Incision status: No drainage or redness    Edema/Swelling/Teds: Having some swelling and bruising but has been using ice. Pain level and status: Managing really well. Just soreness. Use of pain medications: Using 1 half tab and working well for pain    Blood thinner: Baby ASA BID. Bowels: Hasn't started a stool softener,     Home Care Agency active: NA    Outpatient therapy: NA    Do you have all of your medications: Yes    Changes in medications: no    Instructed pt to call Nurse Navigator or surgeon's office with any questions or concerns. Signed off from pt. Instructed pt to call RN or Surgeon's office with any issues or concerns.     Follow up appointments:    Future Appointments   Date Time Provider Odin Rodarte   4/19/2022 10:45 AM Jorge Mittal PA-C [facility]

## 2022-04-19 ENCOUNTER — OFFICE VISIT (OUTPATIENT)
Dept: ORTHOPEDIC SURGERY | Age: 37
End: 2022-04-19

## 2022-04-19 VITALS — WEIGHT: 237 LBS | HEIGHT: 67 IN | BODY MASS INDEX: 37.2 KG/M2

## 2022-04-19 DIAGNOSIS — Z96.642 STATUS POST TOTAL HIP REPLACEMENT, LEFT: Primary | ICD-10-CM

## 2022-04-19 PROCEDURE — 99024 POSTOP FOLLOW-UP VISIT: CPT | Performed by: PHYSICIAN ASSISTANT

## 2022-04-19 NOTE — PROGRESS NOTES
Dr Brayden Dooley      Date /Time 4/19/2022       11:39 AM EDT  Name Beka Hale.             1985   Location  [facility]  MRN [id number]                Chief Complaint   Patient presents with    Follow-up     1st Po Left LIZETT SX 03/28/2022        History of Present Illness      Beka Hale. is a 29 y.o. male is here for post-op visit after LEFT  13148 Total Hip Arthroplasty      Patient presents to the office today for a follow-up visit. Patient 3 weeks status post left anterior total hip arthroplasty. Doing well at this time. Pain controlled. Denies any fever chills or drainage    Physical Exam    Based off 1997 Exam Criteria    Ht 5' 7\" (1.702 m)   Wt 237 lb (107.5 kg)   BMI 37.12 kg/m²      Constitutional:       General: He is not in acute distress. Appearance: Normal appearance. LEFT Hip: incision clean, intact, healing appropriately. Mild skin irritation proximal incision. No surrounding  erythema or fluctuance. Neuro intact distal. No evidence of DVT. Imaging       Left Hip: [address] [facility] Floor  Radiographs: X-rays were ordered today and reviewed of the left hip.  3 views. AP pelvis, lateral, and false profile. They demonstrate a left total hip arthroplasty in good position. No evidence of loosening or periprosthetic fracture. Assessment and Plan    Latricia Fonseca was seen today for follow-up. Diagnoses and all orders for this visit:    Status post total hip replacement, left  -     XR HIP LEFT (2-3 VIEWS); Future  -     [facility] [place]        Patient's minimal irritation proximal incision is simply due to body habitus and overlap of soft tissue. I recommended cleaning the area daily with antibacterial soap and water. Small amount of Polysporin. He will start physical therapy in 1 week. He will continue to use crutches for the next week and can then wean to a cane.   He will see Dr. Christina Oleary back in 3 months or sooner if problems arise peer    Electronically signed by Moi Holloway PA-C on 4/19/2022 at 11:39 AM  This dictation was generated by voice recognition computer software. Although all attempts are made to edit the dictation for accuracy, there may be errors in the transcription that are not intended.

## 2022-04-27 ENCOUNTER — HOSPITAL ENCOUNTER (OUTPATIENT)
Dept: PHYSICAL THERAPY | Age: 37
Setting detail: THERAPIES SERIES
Discharge: HOME | End: 2022-04-27
Payer: COMMERCIAL

## 2022-04-27 PROCEDURE — 97162 PT EVAL MOD COMPLEX 30 MIN: CPT

## 2022-04-27 PROCEDURE — 97110 THERAPEUTIC EXERCISES: CPT

## 2022-04-27 NOTE — PROGRESS NOTES
[address] [place] [address] and Sports Rehabilitation, [facility], [address]  Phone: [phone]  Fax [phone]                                                    Physical Therapy Certification    We had the pleasure of evaluating the following patient for physical therapy services at [address] [place] [address]. A summary of our findings can be found in the initial assessment below. This includes our plan of care. If you have any questions or concerns regarding these findings, please do not hesitate to contact me at the office phone number checked above. Thank you for the referral.       Physician Signature:_______________________________Date:__________________  By signing above (or electronic signature), therapists plan is approved by physician    [address] EVALUATION    Patient: Luis Westfall. \"Ravindra\"  : 1985   MRN: [id number]     Medical Diagnosis: S/P L THR (K81.282)   Treatment Diagnosis:   L hip pain (M25.552), L hip effusion (M25.452), LE weakness, decreased flexibility, decreased ROM, decreased functional status, impaired balance     Onset Date: 3/28/22  Referral Date: 22  Referring Physician:  Prashanth Beverly PA-C/ Dr. Amari Carrizales     Visits Allowed/Insurance/Certification Information:  Roberta Aguirrekaliameg Jayashree 150- 48 visits, no auth      Restrictions/Precautions:  L hip anterior THR precautions    C-SSRS Triggered by Intake questionnaire (Past 2 wk assessment):   [x] No, Questionnaire did not trigger screening.   [] Yes, Patient intake triggered further evaluation      [] C-SSRS Screening completed  [] PCP notified via Plan of Care  [] Emergency services notified     Latex Allergy:  [x]NO      []YES  Preferred Language for Healthcare:   [x]English       []other:    Pt's Occupation/Job Duties:  Works FT at [employer], as a  for [employer]. Currently off of work until 22.   Job duties:  Walking, standing, lifting up to 100#, climbing, shoveling, pushing, pulling  Likes playing softball, hunting, fishing, boating      Social support/Environment:    Family/caregiver support:   [x]Yes, lives with wife and 2 kids (15, 8)   []No    Home Environment:   []1 story   [x]>2 story (bilevel)   [x] [place] -0  []No rail   []Handrail   Bedroom and bath on first floor      Health History reviewed with pt:   [x]Yes   []No      PMH:  OA, HTN, DDD of lumbar, perthes disease R hip, R hip surgery in [facility]         History of Present Illness:         Pt presents with history 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 Now s/p L THR at [facility] on 3/28/22. He was discharged to home day of surgery. He has not received any home PT. Now referred for PT treatment. He feels his legs are different length since surgery which is causing popping in L knee. 4/19/22 xray L hip   Left Hip: Pj 21: X-rays were ordered today and reviewed of the left hip.  3 views. AP pelvis, lateral, and false profile. They demonstrate a left total hip arthroplasty in good position. No evidence of loosening or periprosthetic fracture. Pain       Patient describes pain to be intermittent lateral aspect L hip and thigh. Having some popping in L knee. Patient reports pain is  1/10 pain at present and  5/10 pain at its worst.  Worsened by: Incision hurts with prolonged sitting, some discomfort with transitional movements    Improved by:  Changing position, ice    Pt. reports knee/hip/ankle crepitus, locking, buckling:   [x]Yes   []No   []NA  L knee buckled, but didn't fall since surgery.     Current Functional Limitations:   [x]Yes   []No  Functional complaints:  Not able to work, limited with recreational sports, limited with some household chores    PLOF:   [x]No functional limitations   []Pt with previous functional limitations   Pt's sleep is affected? []Yes   [x]No, can sleep on left side     Patient goal for therapy:  \"return to normal\"          OBJECTIVE FINDINGS         Gait/Steps/Balance       []Gait WNL unless otherwise noted below:                             [x]Deviations on a level linoleum surface include:  Vaulting gait on L due to leg length difference L >R with cane    []Steps WNL (reciprocal pattern with 0-1 rail) unless otherwise noted below:    [x]Deviations include: alt with cane and rail    [x]All balance WNL unless otherwise noted below:      Static/ Dynamic Sitting:    Static/Dynamic Standing:  SLS L 8 sec, R 15 sec    Falls Risk Assessment (30 days):  [x] Falls Risk assessed and no intervention required.   [] Falls Risk assessed and Patient requires intervention due to being higher risk   TUG score (>12s at risk):     [] Falls education provided, including     Quick Tests/Functional Myotome Tests:   Unilateral sit to stand (L1-3)   []NT  []Able to perform WNL   []Unable to perform     Heel Walk (L4):       []NT  [x]Able to perform WNL   []Unable to perform         Toe Walk (S1):        []NT  [x]Able to perform WNL   []Unable to perform        Posture: [] WNL  [x]Forward head   [x]Forward flexed trunk    []Pronounced CT junction    []Increased thoracic kyphosis   []Increased lumbar lordosis   []Scoliosis   []Swayback   [x]Decreased WB on   []R   [x]L   With cane  []Other:         Sensation   [x]All dermatomes WNL for light touch except around incision      Range of Motion/Strength Testing-Myotomes    [x]All ROM WNL except as marked below    [x]All strength WNL (5/5) except as marked below (measured out of 5)   [x]All  myotomes WNL (5/5) except as marked below (measured out of 5)      Range Tested AROM PROM MMT/Resisted Comments   *denotes pain Left Right Left Right Left Right    Hip Flexion  (L1-2) 105 115   3-/5 5/5    Hip Extension     3+/5 4/5    Hip Abduction  (L5)     3+/5 4+/5    Hip Adduction  (L3)          Hip IR          Hip ER          Knee Flexion  (L5,S1) 129 137   5/5 5/5    Knee Extension  (L3,4) 0 0   5/5 5/5    Ankle Dorsiflex  (L4)     5/5 5/5    Ankle Plantarflex  (S1,2)     5/5 5/5    Ankle Inversion     5/5 5/5    Ankle Eversion     5/5 5/5    Great Toe Ext  (L5)              Flexibility     Muscle Findings   Hip flexors/Dave  -WNL   -Decreased R   -Decreased L   -NT   Hamstrings  Degrees in 90/90 []WNL   [x]Decreased R   [x]Decreased L   []NT  Right: -27             Left:  -39    Gastrocs []WNL   []Decreased R   []Decreased L   []NT   Obers/TFL/ITB []WNL   []Decreased R   []Decreased L   []NT   Piriformis  []WNL   [x]Decreased R   [x]Decreased L   []NT   Other:  []WNL   []Decreased R   []Decreased L   []NT         Palpation     Patient reported tenderness with palpation:  [x]Yes   []No   []NT  Location:  Around incision, L lat hip  PT notes warmth:  []Yes   [x]No   []NT  Location:   PT notes increased muscle tone:   [x]Yes   []No   []NT  Location: B hamstrings and piriformis  PT notes crepitus with palpation:   []Yes   [x]No   []NT   Location:     Appearance    PT notes swelling:   [x]Yes   []No   []NT  Location:   L leg  PT notes redness:  [x]Yes   []No   []NT  Location: some redness noted around proximal incision, still healing  PT notes drainage:   []Yes   [x]No   []NT  Location:      Girth Measurements (cm)        Location Left Right Location Left Right   2\" above mid patella   3\" inferior to inferior patellar pole     Mid patella   6\" inferior to inferior patellar pole     2\" below mid patella   Malleoli     Inferior patellar pole   Figure 8     LEG LENGTH 90.0 86.0 Met heads        Co-morbidities/Complexities (which will affect course of rehabilitation):  []None           Arthritic conditions   []Rheumatoid arthritis (M05.9)  [x]Osteoarthritis (M19.91)   Cardiovascular conditions   [x]Hypertension (I10)  []Hyperlipidemia (E78.5)  []Angina pectoris (I20)  []Atherosclerosis (I70)   Musculoskeletal conditions   [x]Disc pathology   []Congenital spine pathologies   []Prior surgical intervention  []Osteoporosis (M81.8)  []Osteopenia (M85.8)   Endocrine conditions   []Hypothyroid (E03.9)  []Hyperthyroid Gastrointestinal conditions   []Constipation (Q49.87)   Metabolic conditions   []Morbid obesity (E66.01)  []Diabetes type 1(E10.65) or 2 (E11.65)   []Neuropathy (G60.9)     Pulmonary conditions   []Asthma (J45)  []Coughing   []COPD (J44.9)   Psychological Disorders  []Anxiety (F41.9)  []Depression (F32.9)   []Other:   [x]Other:   Perthes disease  Avascular necrosis         4 weeks p/o on 4/25/22    Functional Outcome Measure:   []NA  Measure Used:  FOTO  Date Assessed:4/27/22  FOTO initial Score: 53  Predicted d/c FOTO score:  78    ASSESSMENT: Patient presents with s/s consistent with Dx. Recommend PT treatment to address problem list so that the patient may return to regular activities without any functional limitations noted.    Functional Impairments:     []Noted lumbar/proximal hip/LE joint hypomobility   [x]Decreased LE functional ROM   [x]Decreased core/proximal hip strength and neuromuscular control   [x]Decreased LE functional strength   [x]Reduced balance/proprioceptive control   []other:      Functional Activity Limitations (from functional questionnaire and intake)   [x]Reduced ability to tolerate prolonged functional positions   [x]Reduced ability or difficulty with changes of positions or transfers between positions   [x]Reduced ability to maintain good posture and demonstrate good body mechanics with sitting, bending, and lifting   []Reduced ability to sleep   [] Reduced ability or tolerance with driving and/or computer work   [x]Reduced ability to perform lifting, carrying tasks   [x]Reduced ability to squat   [x]Reduced ability to forward bend   [x]Reduced ability to ambulate prolonged functional periods/distances/surfaces   [x]Reduced ability to ascend/descend stairs   [x]Reduced ability to run, hop, cut or jump   []other:    Participation Restrictions   []Reduced participation in self care activities   [x]Reduced participation in home management activities   [x]Reduced participation in work activities   [x]Reduced participation in social activities. [x]Reduced participation in sport/recreation activities. Classification :    [x]Signs/symptoms consistent with post-surgical status including decreased ROM, strength and function. []Signs/symptoms consistent with joint sprain/strain   []Signs/symptoms consistent with patella-femoral syndrome   []Signs/symptoms consistent with knee OA/hip OA   []Signs/symptoms consistent with internal derangement of knee/Hip   []Signs/symptoms consistent with functional hip weakness/NMR control      []Signs/symptoms consistent with tendinitis/tendinosis    []signs/symptoms consistent with pathology which may benefit from Dry needling      []other:      Rehabilitation Potential:  Good for goals listed below. Strengths for achieving goals include:   [x]Pt motivated   [x]PLOF   [x]Family support   Limitations for achieving goals include: []Pain  []Severity of condition     []Cognitive   []Lack of family support   []Lack of resources     []Other:         Prognosis:   [x]Good   []Fair   []Poor    GOALS:  Short Term Goals: 2 weeks  1. Independent in HEP and progression per patient tolerance, in order to prevent re-injury. [] Progressing: [] Met: [] Not Met: [] Adjusted   2. Patient will have a decrease in pain to facilitate improvement in movement, function, and ADLs as indicated by Functional Deficits. [] Progressing: [] Met: [] Not Met: [] Adjusted     Long Term Goals: 8 weeks  1. Achieve d/c FOTO score of 78 or greater to assist with reaching prior level of function. [] Progressing: [] Met: [] Not Met: [] Adjusted   2. Patient will demonstrate increased AROM L hip/knee/HS equal to R LE to allow for proper joint functioning as indicated by patients Functional Deficits.    [] Progressing: [] Met: [] Not Met: [] Adjusted   3. Patient will demonstrate an increase in strength to 5/5 throughout L LE to allow for proper functional mobility as indicated by patients Functional Deficits. [] Progressing: [] Met: [] Not Met: [] Adjusted   4. Patient will return to all transfers, work activities, and functional activities without increased symptoms or restriction. [] Progressing: [] Met: [] Not Met: [] Adjusted   5. Patient will have 0-2/10 pain with ADL's.  [] Progressing: [] Met: [] Not Met: [] Adjusted   6. Patient stated goal: walk community distances on varied surfaces without AD, and up/down stairs with alt pattern without rails independently. [] Progressing: [] Met: [] Not Met: [] Adjusted     PLAN OF CARE     To see patient  1-2 x/week for 8  weeks for the following treatment interventions:  [x]Therapeutic Exercise  [x]Modalities of Choice:   []Heat   []Cold   []US   []Estim   []Ionto  []Mechanical Traction   [x]Manual Therapy  [x]Neuromuscular Re-Education  [x]Gait Training   [x]Therapeutic Activity  []Other     Physical Therapy Evaluation Complexity Justification  [] EVAL (LOW) 51784 (typically 20 minutes face-to-face)  [x] EVAL (MOD) 12201 (typically 30 minutes face-to-face)  [] EVAL (HIGH) 71959 (typically 45 minutes face-to-face)  [] RE-EVAL     Thank you for the referral of this patient.       Timed Code Treatment Minutes:  30   minutes   Total Treatment Time:  60  minutes    Oxana Estrada, PT MPT, OMT-C 1367

## 2022-05-03 ENCOUNTER — HOSPITAL ENCOUNTER (OUTPATIENT)
Dept: PHYSICAL THERAPY | Age: 37
Setting detail: THERAPIES SERIES
Discharge: HOME | End: 2022-05-03
Payer: COMMERCIAL

## 2022-05-03 PROCEDURE — 97110 THERAPEUTIC EXERCISES: CPT

## 2022-05-03 NOTE — FLOWSHEET NOTE
[address] [place] [address] and Sports Rehabilitation, [address]  Phone [phone]  Fax [phone]    Outpatient Physical Therapy     [x] Daily Treatment Note   [] Progress Note   [] Discharge Note    Date:  5/3/2022    Patient Name:  Jer Reynaga. \"Ravindra\"       YOB: 1985    Medical Diagnosis: S/P L THR (O44.347)   Treatment Diagnosis:   L hip pain (M25.552), L hip effusion (M25.452), LE weakness, decreased flexibility, decreased ROM, decreased functional status, impaired balance     Onset Date: 3/28/22  Referral Date: 4/19/22  Referring Physician:  Lynda Tay PA-C/ Dr. Sylvia Li     Visits Allowed/Insurance/Certification Information:  Nina Chester- 48 visits, no auth      Restrictions/Precautions:  L hip anterior THR precautions    Latex Allergy:  [x]NO      []YES    Plan of care sent to provider:   []NA   []Faxed   [x]Co-signature       Plan of care signed:    [x]NA   []Yes   []No      Progress report will be due (10 Rx or 30 days whichever is less): 8/53/24     Recertification will be due (POC Duration  / 90 days whichever is less): 7/27/22    Visit# / total visits:     Visit # Insurance Allowable Auth Required   In Person 2/16 50 []  Yes     [x]  No    Tele Health 0  []  Yes     []  No    Total 2/16       Plan for Next Session:  Add forward step downs, SLS, leg press,   gait training without AD, assess effectiveness of heel lift    Subjective:   Reports he is doing well overall. Doing well with heel lift but likes it better in his boots vs his slip on shoes. Reports since his hip surgery he has had popping in lateral knee and feels like his knee wants to buckle medially. Pain level: 0-1/10 the majority of the time  AT EVAL: Patient describes pain to be intermittent lateral aspect L hip and thigh. Having some popping in L knee. Patient reports pain is  1/10 pain at present and  5/10 pain at its worst.  Worsened by:   Incision hurts with prolonged sitting, some discomfort with transitional movements        Objective:         5 weeks p/o on 5/2/22  Exercises:    Exercises in bold performed in department today. Items not bolded are carried forward from prior visits for continuity of the record. Exercise/Equipment Resistance/Repetitions Issued for HEP   NUSTEP  L5 x 5'    TA with ball squeeze 6 sec/ 2x10 x   Bridge with ball squeeze   2x10 x   sidelying hip abd  2x10 x   sidelying clam shells  2x10 x   Prone hip ext 2x10  (small range) x   Prone knee flex 2x10 x   Seated HS stretch 30 sec/ x3 x   LAQ   2x10 x   Standing heel raises  2x10 x   Standing hip abd B 2x10 x   Standing hip ext B 2x10 x                                                      Therapeutic Exercise/Home Exercise Program:   X 33 min. Instructed in HEP with handout given. Group Therapy:    0 minutes    Therapeutic Activity:  0 minutes     Gait: 0 minutes    Neuromuscular Re-Education:  0 minutes      Canalith Repositioning Procedure:  0 minutes    Manual Therapy:  0 minutes    Modalities: 0 minutes   [] GAME READY (VASO)- for significant edema, swelling, pain control. Functional Outcome Measure:   []NA  Measure Used:  FOTO  Date Assessed:4/27/22  FOTO initial Score: 53  Predicted d/c FOTO score:  78    Assessment/Treatment/Activity Tolerance:    Patients response to treatment:   [x]Patient tolerated treatment well with no adverse reactions noted    []Patient limited by fatigue   []Patient limited by pain    []Patient limited by other medical complications   []Other:     Goals:   Progress towards goals:  Goals established on 4/27/22  GOALS:  Short Term Goals: 2 weeks  1. Independent in HEP and progression per patient tolerance, in order to prevent re-injury. []? Progressing: []? Met: []? Not Met: []? Adjusted       2. Patient will have a decrease in pain to facilitate improvement in movement, function, and ADLs as indicated by Functional Deficits.   []? Progressing: []? Met: []? Not Met: []? Adjusted          Long Term Goals: 8 weeks  1. Achieve d/c FOTO score of 78 or greater to assist with reaching prior level of function. []? Progressing: []? Met: []? Not Met: []? Adjusted      2. Patient will demonstrate increased AROM L hip/knee/HS equal to R LE to allow for proper joint functioning as indicated by patients Functional Deficits. []? Progressing: []? Met: []? Not Met: []? Adjusted       3. Patient will demonstrate an increase in strength to 5/5 throughout L LE to allow for proper functional mobility as indicated by patients Functional Deficits. []? Progressing: []? Met: []? Not Met: []? Adjusted       4. Patient will return to all transfers, work activities, and functional activities without increased symptoms or restriction. []? Progressing: []? Met: []? Not Met: []? Adjusted       5. Patient will have 0-2/10 pain with ADL's.  []? Progressing: []? Met: []? Not Met: []? Adjusted       6. Patient stated goal: walk community distances on varied surfaces without AD, and up/down stairs with alt pattern without rails independently. []? Progressing: []? Met: []? Not Met: []? Adjusted    Prognosis: [x]Good   []Fair   []Poor    Patient Requires Follow-up:  [x]Yes  []No    Plan: []Plan of care initiated     [x]Continue per plan of care    [] Alter current plan (see comments)    []Hold pending MD visit []Discharge    Charges:  Timed Code Treatment Minutes: 33   Total Treatment Minutes:  33   BWC time for each procedure?:  TE TIME:  NMR TIME:  MANUAL TIME:  UNTIMED MINUTES:       [] EVAL (LOW) 48827 (typically 20 minutes face-to-face)  [] EVAL (MOD) 85113 (typically 30 minutes face-to-face)  [] EVAL (HIGH) 68792 (typically 45 minutes face-to-face)  [] RE-EVAL     [x] KO(64826) x 2    [] IONTO  [] NMR (35627) x     [] VASO  [] Manual (78225) x     [] Other:  [] TA x      [] Mech Traction (70365)  [] ES(attended) (37537)     [] ES (un) (16670):     Medicare Cap total YTD:   [x]N/A    Electronically signed by:  Kelli Guardado, [id number]      Note: If patient does not return for scheduled/ recommended follow up visits, this note will serve as a discharge from care along with most recent update on progress.

## 2022-05-05 ENCOUNTER — HOSPITAL ENCOUNTER (OUTPATIENT)
Dept: PHYSICAL THERAPY | Age: 37
Setting detail: THERAPIES SERIES
Discharge: HOME | End: 2022-05-05
Payer: COMMERCIAL

## 2022-05-05 PROCEDURE — 97110 THERAPEUTIC EXERCISES: CPT

## 2022-05-05 PROCEDURE — 97116 GAIT TRAINING THERAPY: CPT

## 2022-05-05 NOTE — FLOWSHEET NOTE
[address] [place] [address] and Sports Rehabilitation, [address]  Phone [phone]  Fax [phone]    Outpatient Physical Therapy     [x] Daily Treatment Note   [] Progress Note   [] Discharge Note    Date:  5/5/2022    Patient Name:  Luis Bee. \"Ravindra\"       YOB: 1985    Medical Diagnosis: S/P L THR (H39.672)   Treatment Diagnosis:   L hip pain (M25.552), L hip effusion (M25.452), LE weakness, decreased flexibility, decreased ROM, decreased functional status, impaired balance     Onset Date: 3/28/22  Referral Date: 4/19/22  Referring Physician:  Ismael Rodriguez PA-C/ Dr. Johnathan López     Visits Allowed/Insurance/Certification Information:  Roberta Conniemarianela Jayashree 150- 48 visits, no auth      Restrictions/Precautions:  L hip anterior THR precautions    Latex Allergy:  [x]NO      []YES    Plan of care sent to provider:   []NA   []Faxed   [x]Co-signature       Plan of care signed:    [x]NA   []Yes   []No      Progress report will be due (10 Rx or 30 days whichever is less): 9/00/06     Recertification will be due (POC Duration  / 90 days whichever is less): 7/27/22    Visit# / total visits:     Visit # Insurance Allowable Auth Required   In Person 3/16 50 []  Yes     [x]  No    Tele Health 0  []  Yes     []  No    Total 3/16       Plan for Next Session:  Add  leg press,   gait training without AD,     Subjective:   Reports things are about the same, no c/o with heel lift, limps less with it in place      Pain level: 0-1/10 the majority of the time  AT EVAL: Patient describes pain to be intermittent lateral aspect L hip and thigh. Having some popping in L knee. Patient reports pain is  1/10 pain at present and  5/10 pain at its worst.  Worsened by: Incision hurts with prolonged sitting, some discomfort with transitional movements        Objective:         5 weeks p/o on 5/2/22  Exercises:    Exercises in bold performed in department today.   Items not bolded are carried forward from prior visits for continuity of the record. Exercise/Equipment Resistance/Repetitions Issued for HEP   NUSTEP  L5 x 7'    TA with ball squeeze 6 sec/ 2x10 x   Bridge with ball squeeze   2x10 x   sidelying hip abd  2x10 x   sidelying clam shells  2x10 x   Prone hip ext 2x10  (small range) x   Prone knee flex 2x10 x   Seated HS stretch 30 sec/ x3 x   LAQ   2#, 2x10 x   Standing heel raises  2x10 x   Standing hip abd B 2x10 x   Standing hip ext B 2x10 x   forward step downs   2x10    SLS   3x max hold x                                        Therapeutic Exercise/Home Exercise Program:   X 33 min. Pt not requiring any rests    Instructed in HEP with handout given. Group Therapy:    0 minutes    Therapeutic Activity:  0 minutes     Gait: 1 minutes  Improved by end of ex, cues for strong leg, heel/toe sequence    Neuromuscular Re-Education:  0 minutes      Canalith Repositioning Procedure:  0 minutes    Manual Therapy:  0 minutes    Modalities: 0 minutes   [] GAME READY (VASO)- for significant edema, swelling, pain control. Functional Outcome Measure:   []NA  Measure Used:  FOTO  Date Assessed:4/27/22  FOTO initial Score: 53  Predicted d/c FOTO score:  78    Assessment/Treatment/Activity Tolerance:    Patients response to treatment:   [x]Patient tolerated treatment well with no adverse reactions noted    []Patient limited by fatigue   []Patient limited by pain    []Patient limited by other medical complications   []Other:     Goals:   Progress towards goals:  Goals established on 4/27/22  GOALS:  Short Term Goals: 2 weeks  1. Independent in HEP and progression per patient tolerance, in order to prevent re-injury. []? Progressing: []? Met: []? Not Met: []? Adjusted       2. Patient will have a decrease in pain to facilitate improvement in movement, function, and ADLs as indicated by Functional Deficits. []? Progressing: []? Met: []? Not Met: []?  Adjusted          Long Term Goals: 8 weeks  1. Achieve d/c FOTO score of 78 or greater to assist with reaching prior level of function. []? Progressing: []? Met: []? Not Met: []? Adjusted      2. Patient will demonstrate increased AROM L hip/knee/HS equal to R LE to allow for proper joint functioning as indicated by patients Functional Deficits. []? Progressing: []? Met: []? Not Met: []? Adjusted       3. Patient will demonstrate an increase in strength to 5/5 throughout L LE to allow for proper functional mobility as indicated by patients Functional Deficits. []? Progressing: []? Met: []? Not Met: []? Adjusted       4. Patient will return to all transfers, work activities, and functional activities without increased symptoms or restriction. []? Progressing: []? Met: []? Not Met: []? Adjusted       5. Patient will have 0-2/10 pain with ADL's.  []? Progressing: []? Met: []? Not Met: []? Adjusted       6. Patient stated goal: walk community distances on varied surfaces without AD, and up/down stairs with alt pattern without rails independently. []? Progressing: []? Met: []? Not Met: []? Adjusted    Prognosis: [x]Good   []Fair   []Poor    Patient Requires Follow-up:  [x]Yes  []No    Plan: []Plan of care initiated     [x]Continue per plan of care    [] Alter current plan (see comments)    []Hold pending MD visit []Discharge    Charges:  Timed Code Treatment Minutes: 34   Total Treatment Minutes:  34   BWC time for each procedure?:  TE TIME:  NMR TIME:  MANUAL TIME:  UNTIMED MINUTES:       [] EVAL (LOW) 77040 (typically 20 minutes face-to-face)  [] EVAL (MOD) 79046 (typically 30 minutes face-to-face)  [] EVAL (HIGH) 67454 (typically 45 minutes face-to-face)  [] RE-EVAL     [x] GY(09169) x 2    [] IONTO  [] NMR (67279) x     [] VASO  [] Manual (14530) x     [] Other:gt  [] TA x      [] Mech Traction (85476)  [] ES(attended) (84626)     [] ES (un) (76463):     Medicare Cap total YTD:   [x]N/A    Electronically signed by:  Brandy Toth, [id number]      Note: If patient does not return for scheduled/ recommended follow up visits, this note will serve as a discharge from care along with most recent update on progress.

## 2022-05-10 ENCOUNTER — HOSPITAL ENCOUNTER (OUTPATIENT)
Dept: PHYSICAL THERAPY | Age: 37
Setting detail: THERAPIES SERIES
Discharge: HOME | End: 2022-05-10
Payer: COMMERCIAL

## 2022-05-10 DIAGNOSIS — R60.0 LOWER EXTREMITY EDEMA: ICD-10-CM

## 2022-05-10 DIAGNOSIS — I10 ESSENTIAL HYPERTENSION: ICD-10-CM

## 2022-05-10 PROCEDURE — 97110 THERAPEUTIC EXERCISES: CPT

## 2022-05-10 NOTE — FLOWSHEET NOTE
[address] [facility] and Sports Rehabilitation, [address]  Phone [phone]  Fax [phone]    Outpatient Physical Therapy     [x] Daily Treatment Note   [] Progress Note   [] Discharge Note    Date:  5/10/2022    Patient Name:  Hortensia Garcia. \"Ravindra\"       YOB: 1985    Medical Diagnosis: S/P L THR (M94.000)   Treatment Diagnosis:   L hip pain (M25.552), L hip effusion (M25.452), LE weakness, decreased flexibility, decreased ROM, decreased functional status, impaired balance     Onset Date: 3/28/22  Referral Date: 4/19/22  Referring Physician:  Rodrigue Shetty PA-C/ Dr. Romana Neptune     Visits Allowed/Insurance/Certification Information:  [place]- 48 visits, no auth      Restrictions/Precautions:  L hip anterior THR precautions    Latex Allergy:  [x]NO      []YES    Plan of care sent to provider:   []NA   []Faxed   [x]Co-signature       Plan of care signed:    [x]NA   []Yes   []No      Progress report will be due (10 Rx or 30 days whichever is less): 5/60/94     Recertification will be due (POC Duration  / 90 days whichever is less): 7/27/22    Visit# / total visits:     Visit # Insurance Allowable Auth Required   In Person 4/16 50 []  Yes     [x]  No    Tele Health 0  []  Yes     []  No    Total 4/16       Plan for Next Session:     Subjective:   Reports low pain and stiffness but also has deg disc. Reports left knee still pops but does not feel as unstable      Pain level: 0-1/10 the majority of the time  AT EVAL: Patient describes pain to be intermittent lateral aspect L hip and thigh. Having some popping in L knee. Patient reports pain is  1/10 pain at present and  5/10 pain at its worst.  Worsened by: Incision hurts with prolonged sitting, some discomfort with transitional movements        Objective:         6 weeks p/o on 5/9/22  Exercises:    Exercises in bold performed in department today.   Items not bolded are carried forward from prior visits for continuity of the record. Exercise/Equipment Resistance/Repetitions Issued for HEP   NUSTEP  L5 x 10' Subjective taken   TA with ball squeeze 6 sec/ 2x10 x   Bridge with ball squeeze   2x10 x   sidelying hip abd  2x10 x   sidelying clam shells  2x10 x   Prone hip ext 2x10  (small range) x   Prone knee flex 2x10 x   Seated HS stretch 30 sec/ x3 x   LAQ   3#, 3x10 x   Standing heel raises  1\" 2x10 x   Standing hip abd B 2x10 x   Standing hip ext B 2x10 x   forward step downs   4\" 2x10    SLS   3x max hold x   Leg press   Plate 5 [id number]                                  Therapeutic Exercise/Home Exercise Program:   X 30 min. Pt not requiring any rests    Instructed in HEP with handout given. Group Therapy:    0 minutes    Therapeutic Activity:  0 minutes     Gait: 3 minutes  Very min deviation, able to go up and down 4 steps in dept with no hand rail with good control  Improved by end of ex, cues for strong leg, heel/toe sequence    Neuromuscular Re-Education:  0 minutes      Canalith Repositioning Procedure:  0 minutes    Manual Therapy:  0 minutes    Modalities: 0 minutes   [] GAME READY (VASO)- for significant edema, swelling, pain control. Functional Outcome Measure:   []NA  Measure Used:  FOTO  Date Assessed:4/27/22  FOTO initial Score: 53  Predicted d/c FOTO score:  78    Assessment/Treatment/Activity Tolerance:    Patients response to treatment:   [x]Patient tolerated treatment well with no adverse reactions noted    []Patient limited by fatigue   []Patient limited by pain    []Patient limited by other medical complications   []Other:     Goals:   Progress towards goals:  Goals established on 4/27/22  GOALS:  Short Term Goals: 2 weeks  1. Independent in HEP and progression per patient tolerance, in order to prevent re-injury. []? Progressing: []? Met: []? Not Met: []? Adjusted       2.  Patient will have a decrease in pain to facilitate improvement in movement, function, and ADLs as indicated by Functional Deficits. []? Progressing: []? Met: []? Not Met: []? Adjusted          Long Term Goals: 8 weeks  1. Achieve d/c FOTO score of 78 or greater to assist with reaching prior level of function. []? Progressing: []? Met: []? Not Met: []? Adjusted      2. Patient will demonstrate increased AROM L hip/knee/HS equal to R LE to allow for proper joint functioning as indicated by patients Functional Deficits. []? Progressing: []? Met: []? Not Met: []? Adjusted       3. Patient will demonstrate an increase in strength to 5/5 throughout L LE to allow for proper functional mobility as indicated by patients Functional Deficits. []? Progressing: []? Met: []? Not Met: []? Adjusted       4. Patient will return to all transfers, work activities, and functional activities without increased symptoms or restriction. []? Progressing: []? Met: []? Not Met: []? Adjusted       5. Patient will have 0-2/10 pain with ADL's.  []? Progressing: []? Met: []? Not Met: []? Adjusted       6. Patient stated goal: walk community distances on varied surfaces without AD, and up/down stairs with alt pattern without rails independently. []? Progressing: []? Met: []? Not Met: []?  Adjusted    Prognosis: [x]Good   []Fair   []Poor    Patient Requires Follow-up:  [x]Yes  []No    Plan: []Plan of care initiated     [x]Continue per plan of care    [] Alter current plan (see comments)    []Hold pending MD visit []Discharge    Charges:  Timed Code Treatment Minutes: 33   Total Treatment Minutes:  33   BWC time for each procedure?:  TE TIME:  NMR TIME:  MANUAL TIME:  UNTIMED MINUTES:       [] EVAL (LOW) 09339 (typically 20 minutes face-to-face)  [] EVAL (MOD) 17272 (typically 30 minutes face-to-face)  [] EVAL (HIGH) 62933 (typically 45 minutes face-to-face)  [] RE-EVAL     [x] KU(07478) x 2    [] IONTO  [] NMR (49955) x     [] VASO  [] Manual (33077) x     [] Other:gt  [] TA x      [] Mech Traction (73106)  [] ES(attended) (31596)     [] ES () (71700): Medicare Cap total YTD:   [x]N/A    Electronically signed by:  RISA [id number]      Note: If patient does not return for scheduled/ recommended follow up visits, this note will serve as a discharge from care along with most recent update on progress.

## 2022-05-11 RX ORDER — LISINOPRIL 10 MG/1
10 TABLET ORAL DAILY
Qty: 90 TABLET | Refills: 1 | Status: SHIPPED | OUTPATIENT
Start: 2022-05-11 | End: 2022-11-03

## 2022-05-11 RX ORDER — HYDROCHLOROTHIAZIDE 25 MG/1
25 TABLET ORAL EVERY MORNING
Qty: 90 TABLET | Refills: 1 | Status: SHIPPED | OUTPATIENT
Start: 2022-05-11 | End: 2022-07-12 | Stop reason: SDUPTHER

## 2022-05-11 NOTE — TELEPHONE ENCOUNTER
.  Refill Request     CONFIRM preferrred pharmacy with the patient. If Mail Order Rx - Pend for 90 day refill.       Last Seen: Last Seen Department: 3/7/2022  Last Seen by PCP: 3/7/2022    Last Written: 2-11-22 90 with 0     Next Appointment:   Future Appointments   Date Time Provider Odin Rodarte   5/13/2022  9:00 AM Omelia Res, PT [address] [facility]   5/17/2022  9:00 AM [facility]   5/20/2022  9:00 AM Omelia Res, PT [facility]   5/24/2022  9:00 AM Nataly Marker [address] [facility]   5/27/2022  9:00 AM Omelia Res, PT [facility]   7/21/2022  9:45 AM Reji Grant MD AND [facility]TH[facility]         Requested Prescriptions     Pending Prescriptions Disp Refills    hydroCHLOROthiazide (HYDRODIURIL) 25 MG tablet 90 tablet 0     Sig: Take 1 tablet by mouth every morning    lisinopril (PRINIVIL;ZESTRIL) 10 MG tablet 90 tablet 0     Sig: Take 1 tablet by mouth daily

## 2022-05-13 ENCOUNTER — HOSPITAL ENCOUNTER (OUTPATIENT)
Dept: PHYSICAL THERAPY | Age: 37
Setting detail: THERAPIES SERIES
Discharge: HOME | End: 2022-05-13
Payer: COMMERCIAL

## 2022-05-13 PROCEDURE — 97112 NEUROMUSCULAR REEDUCATION: CPT

## 2022-05-13 PROCEDURE — 97110 THERAPEUTIC EXERCISES: CPT

## 2022-05-13 NOTE — FLOWSHEET NOTE
[address] [facility] and Sports Rehabilitation, [address]  Phone [phone]  Fax [phone]    Outpatient Physical Therapy     [x] Daily Treatment Note   [] Progress Note   [] Discharge Note    Date:  5/13/2022    Patient Name:  Emilia Malone. \"Ravindra\"       YOB: 1985    Medical Diagnosis: S/P L THR (F04.989)   Treatment Diagnosis:   L hip pain (M25.552), L hip effusion (M25.452), LE weakness, decreased flexibility, decreased ROM, decreased functional status, impaired balance     Onset Date: 3/28/22  Referral Date: 4/19/22  Referring Physician:  Latesha Medina PA-C/ Dr. Otero Alisha     Visits Allowed/Insurance/Certification Information:  [place]- 48 visits, no auth      Restrictions/Precautions:  L hip anterior THR precautions    Latex Allergy:  [x]NO      []YES    Plan of care sent to provider:   []NA   []Faxed   [x]Co-signature       Plan of care signed:    []NA   [x]Yes 5/2/22 []No      Progress report will be due (10 Rx or 30 days whichever is less): 3/69/66     Recertification will be due (POC Duration  / 90 days whichever is less): 7/27/22    Visit# / total visits:     Visit # Insurance Allowable Auth Required   In Person 5/16 50 []  Yes     [x]  No    Tele Health 0  []  Yes     []  No    Total 5/16       Plan for Next Session: add side stepping with tband, floor to waist lifts, lat pull down    Subjective:   Patient doing well. Still feeling weakness in his surrounding muscles, but his L hip joint is feeling good. Follows up with MD again on 7/21/22 and plans to RTW after seeing MD again. Patient noticing improvement with stairs, but still having difficulty with putting on socks. Performing HEP inconsistently. Sleeping through the night without waking due to pain. Not icing. Pain level: 0/10 currently, 2/10 at worst since last visit. AT EVAL: Patient describes pain to be intermittent lateral aspect L hip and thigh. Having some popping in L knee. Patient reports pain is  1/10 pain at present and  5/10 pain at its worst.  Worsened by: Incision hurts with prolonged sitting, some discomfort with transitional movements        Objective:         6 weeks p/o on 5/9/22  Exercises:    Exercises in bold performed in department today. Items not bolded are carried forward from prior visits for continuity of the record. Exercise/Equipment Resistance/Repetitions Issued for HEP   NUSTEP  L5 x 10' Subjective taken   TA with ball squeeze 6 sec/ 2x10 x   Bridge with ball squeeze   3x10 x   sidelying hip abd  2x10, 2# x   sidelying clam shells  3x10, blue tband x   Prone hip ext 2x10, 2#  (small range) x   Prone knee flex 3x10, 2# x   Seated HS stretch 30 sec/ x3  (manual today) x   Prone quad stretch 30 sec/ x3  (manual today) x   Standing heel raises  x30 on blue foam x   Standing hip abd B 2x15 on blue foam x   Standing hip ext B 2x15 on blue foam x   forward step downs   4\" 2x15    SLS   30 sec/ x2 x   Leg press   Plate 5 3G70 B LE  Plate 2 1I72 L LE      Sidestepping with tband around lower legs nv      Forward Step ups 4\" x30   (increase to 6\" at nv)       Lateral step ups 4\" x30  (increase to 6\" at nv)     knee ext machine B LE 3 plates 2W49      Standing high marches x30 on blue foam                                                                            Therapeutic Exercise/Home Exercise Program:   X40 min. Reviewed and progressed exercises as noted above.       Group Therapy:    0 minutes    Therapeutic Activity:  0 minutes     Gait: 0 minutes  Very min deviation, able to go up and down 4 steps in dept with no hand rail with good control  Improved by end of ex, cues for strong leg, heel/toe sequence    Neuromuscular Re-Education:  10 minutes  Balance training and muscle re-ed as noted above      Canalith Repositioning Procedure:  0 minutes    Manual Therapy:  5 minutes  Manual stretching as noted above    Modalities: 15 minutes- CP to L hip with patient in supine   [] GAME READY (VASO)- for significant edema, swelling, pain control. Functional Outcome Measure:   []NA  Measure Used:  FOTO  Date Assessed:4/27/22  FOTO initial Score: 53  Predicted d/c FOTO score:  78    Assessment/Treatment/Activity Tolerance:    Patients response to treatment:   [x]Patient tolerated treatment well with no adverse reactions noted    []Patient limited by fatigue   []Patient limited by pain    []Patient limited by other medical complications   [x]Other:  Pain 0/10 after treatment    Goals:   Progress towards goals:  Goals established on 4/27/22  GOALS:  Short Term Goals: 2 weeks  1. Independent in HEP and progression per patient tolerance, in order to prevent re-injury. []? Progressing: []? Met: []? Not Met: []? Adjusted       2. Patient will have a decrease in pain to facilitate improvement in movement, function, and ADLs as indicated by Functional Deficits. []? Progressing: []? Met: []? Not Met: []? Adjusted          Long Term Goals: 8 weeks  1. Achieve d/c FOTO score of 78 or greater to assist with reaching prior level of function. []? Progressing: []? Met: []? Not Met: []? Adjusted      2. Patient will demonstrate increased AROM L hip/knee/HS equal to R LE to allow for proper joint functioning as indicated by patients Functional Deficits. []? Progressing: []? Met: []? Not Met: []? Adjusted       3. Patient will demonstrate an increase in strength to 5/5 throughout L LE to allow for proper functional mobility as indicated by patients Functional Deficits. []? Progressing: []? Met: []? Not Met: []? Adjusted       4. Patient will return to all transfers, work activities, and functional activities without increased symptoms or restriction. []? Progressing: []? Met: []? Not Met: []? Adjusted       5. Patient will have 0-2/10 pain with ADL's.  []? Progressing: []? Met: []? Not Met: []? Adjusted       6.  Patient stated goal: walk community distances on varied surfaces without AD, and up/down stairs with alt pattern without rails independently. []? Progressing: []? Met: []? Not Met: []? Adjusted    Prognosis: [x]Good   []Fair   []Poor    Patient Requires Follow-up:  [x]Yes  []No    Plan: []Plan of care initiated     [x]Continue per plan of care    [] Alter current plan (see comments)    []Hold pending MD visit []Discharge    Charges:  Timed Code Treatment Minutes: 55   Total Treatment Minutes:  70   [facility] time for each procedure?:  TE TIME:  NMR TIME:  MANUAL TIME:  UNTIMED MINUTES:       [] EVAL (LOW) 45955 (typically 20 minutes face-to-face)  [] EVAL (MOD) 46576 (typically 30 minutes face-to-face)  [] EVAL (HIGH) 45772 (typically 45 minutes face-to-face)  [] RE-EVAL     [x] VZ(30165) x 3  [] IONTO  [x] NMR (83909) x1     [] VASO  [] Manual (21243) x     [] Other:gt  [] TA x      [] Mech Traction (48966)  [] ES(attended) (11136)     [] ES (un) (43216): Medicare Cap total YTD:   [x]N/A    Electronically signed by:  Alexandra Carrera PT, MPT, OMT-C [id number]        Note: If patient does not return for scheduled/ recommended follow up visits, this note will serve as a discharge from care along with most recent update on progress.

## 2022-05-17 ENCOUNTER — HOSPITAL ENCOUNTER (OUTPATIENT)
Dept: PHYSICAL THERAPY | Age: 37
Setting detail: THERAPIES SERIES
Discharge: HOME | End: 2022-05-17
Payer: COMMERCIAL

## 2022-05-17 NOTE — FLOWSHEET NOTE
[address] [place] [address] [facility] [address]  Phone [phone]  Fax [phone]    Physical Therapy  Cancellation/No-show Note  Patient Name:  Cristina Dill. :  1985   Date:  2022    Cancelled visits to date: 1  No-shows to date: 0    For today's appointment patient:  [x]  Cancelled  []  Rescheduled appointment  []  No-show     Reason given by patient:  []  Patient ill  []  Conflicting appointment  []  No transportation    []  Conflict with work  []  No reason given  [x]  Other:     Comments:  Back pain  Phone call information:   []  Phone call made today to patient at     am/pm at the number provided:      []  Patient answered, conversation as follows:    []  Patient did not answer, message left as follows:  [x]  Phone call not needed - pt contacted us to cancel and provided reason for cancellation.      Electronically signed by:  Suzette Mercer, [id number]

## 2022-05-18 ENCOUNTER — TELEPHONE (OUTPATIENT)
Dept: ORTHOPEDIC SURGERY | Age: 37
End: 2022-05-18

## 2022-05-20 ENCOUNTER — HOSPITAL ENCOUNTER (OUTPATIENT)
Dept: PHYSICAL THERAPY | Age: 37
Setting detail: THERAPIES SERIES
Discharge: HOME | End: 2022-05-20
Payer: COMMERCIAL

## 2022-05-20 PROCEDURE — 97112 NEUROMUSCULAR REEDUCATION: CPT

## 2022-05-20 PROCEDURE — 97110 THERAPEUTIC EXERCISES: CPT

## 2022-05-20 NOTE — FLOWSHEET NOTE
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 Razia Hubbard     Visits Allowed/Insurance/Certification Information:  Roberta Bowmanmarymeg Cumminsfelipe 150- 48 visits, no auth      Restrictions/Precautions:  L hip anterior THR precautions    Latex Allergy:  [x]NO      []YES    Plan of care sent to provider:   []NA   []Faxed   [x]Co-signature       Plan of care signed:    []NA   [x]Yes 5/2/22 []No      Progress report will be due (10 Rx or 30 days whichever is less): 8/08/02     Recertification will be due (POC Duration  / 90 days whichever is less): 7/27/22    Visit# / total visits:     Visit # Insurance Allowable Auth Required   In Person 6/16 50 []  Yes     [x]  No    Tele Health 0  []  Yes     []  No    Total 6/16       Plan for Next Session: add  floor to waist lifts, donkey kick, fire hydrant     Subjective:   Patient was changing his mower blades and tweaked his back which is feeling better now. L hip is feeling good. It feels stronger and more back to normal.  Planning to RTW on July 25. Performing HEP daily. Pain level: 0/10 currently, 2/10 at worst since last visit. AT EVAL: Patient describes pain to be intermittent lateral aspect L hip and thigh. Having some popping in L knee. Patient reports pain is  1/10 pain at present and  5/10 pain at its worst.  Worsened by:   Incision hurts with prolonged sitting, some discomfort with transitional movements        Objective:         8 weeks p/o on 5/23/22  Exercises:    Exercises in bold performed in department today. Items not bolded are carried forward from prior visits for continuity of the record. Exercise/Equipment Resistance/Repetitions Issued for HEP   NUSTEP  L5 x 10' Subjective taken   TA with ball squeeze 6 sec/ 2x10 x   Bridge with ball squeeze   3x10 x   sidelying hip abd  2x10, 3# x   sidelying clam shells  3x10, blue tband x   Prone hip ext 2x10, 3#  (small range) x   Prone knee flex 3x10, 3# x   Seated HS stretch 30 sec/ x3  (manual today) x   Prone quad stretch 30 sec/ x3  (manual today) x   Standing heel raises  x30 on blue foam x   Standing hip abd B 2x15 on blue foam x   Standing hip ext B 2x15 on blue foam x   forward step downs   4\" 2x15    SLS   30 sec/ x2 x   Leg press   Plate 6 2H88 B LE  Plate 3 6H43 L LE      Sidestepping with tband around lower legs 20 feet x4, blue tband      Forward Step ups 6\" x30         Lateral step ups 6\" x30       knee ext machine B LE 3 plates 7C47      Standing high marches x30 on blue foam, with ball toss on wall    Lat pull down 7 plates, 5G69    Supine piriformis stretch 30 sec/ x2, just positioning no overpressure                                                                  Therapeutic Exercise/Home Exercise Program:   X40 min. Reviewed and progressed exercises as noted above.     Group Therapy:    0 minutes    Therapeutic Activity:  0 minutes     Gait: 0 minutes  Very min deviation, able to go up and down 4 steps in dept with no hand rail with good control  Improved by end of ex, cues for strong leg, heel/toe sequence    Neuromuscular Re-Education:  10 minutes  Balance training and muscle re-ed as noted above      Canalith Repositioning Procedure:  0 minutes    Manual Therapy:  5 minutes  Manual stretching as noted above    Modalities: 15 minutes- CP to L hip with patient in supine   [] GAME READY (VASO)- for significant edema, swelling, pain control. Functional Outcome Measure:   []NA  Measure Used:  FOTO  Date Assessed:4/27/22  FOTO initial Score: 53  Predicted d/c FOTO score:  78    Assessment/Treatment/Activity Tolerance:    Patients response to treatment:   [x]Patient tolerated treatment well with no adverse reactions noted    []Patient limited by fatigue   []Patient limited by pain    []Patient limited by other medical complications   [x]Other:  Pain 0/10 after treatment    Goals:   Progress towards goals:  Goals established on 4/27/22  GOALS:  Short Term Goals: 2 weeks  1. Independent in HEP and progression per patient tolerance, in order to prevent re-injury. []? Progressing: []? Met: []? Not Met: []? Adjusted       2. Patient will have a decrease in pain to facilitate improvement in movement, function, and ADLs as indicated by Functional Deficits. []? Progressing: []? Met: []? Not Met: []? Adjusted          Long Term Goals: 8 weeks  1. Achieve d/c FOTO score of 78 or greater to assist with reaching prior level of function. []? Progressing: []? Met: []? Not Met: []? Adjusted      2. Patient will demonstrate increased AROM L hip/knee/HS equal to R LE to allow for proper joint functioning as indicated by patients Functional Deficits. []? Progressing: []? Met: []? Not Met: []? Adjusted       3. Patient will demonstrate an increase in strength to 5/5 throughout L LE to allow for proper functional mobility as indicated by patients Functional Deficits. []? Progressing: []? Met: []? Not Met: []? Adjusted       4. Patient will return to all transfers, work activities, and functional activities without increased symptoms or restriction. []? Progressing: []? Met: []? Not Met: []? Adjusted       5. Patient will have 0-2/10 pain with ADL's.  []? Progressing: []? Met: []? Not Met: []? Adjusted       6.  Patient stated goal: walk community distances on varied surfaces without AD, and up/down stairs with alt pattern without rails independently. []? Progressing: []? Met: []? Not Met: []? Adjusted    Prognosis: [x]Good   []Fair   []Poor    Patient Requires Follow-up:  [x]Yes  []No    Plan: []Plan of care initiated     [x]Continue per plan of care    [] Alter current plan (see comments)    []Hold pending MD visit []Discharge    Charges:  Timed Code Treatment Minutes: 55   Total Treatment Minutes:  70   [facility] time for each procedure?:  TE TIME:  NMR TIME:  MANUAL TIME:  UNTIMED MINUTES:       [] EVAL (LOW) 97968 (typically 20 minutes face-to-face)  [] EVAL (MOD) 33192 (typically 30 minutes face-to-face)  [] EVAL (HIGH) 98024 (typically 45 minutes face-to-face)  [] RE-EVAL     [x] HG(53280) x 3  [] IONTO  [x] NMR (59894) x1     [] VASO  [] Manual (55510) x     [] Other:gt  [] TA x      [] Mech Traction (63992)  [] ES(attended) (20815)     [] ES (un) (96762): Medicare Cap total YTD:   [x]N/A    Electronically signed by:  Abimbola Hooper, PT, MPT, OMT-C [id number]        Note: If patient does not return for scheduled/ recommended follow up visits, this note will serve as a discharge from care along with most recent update on progress.

## 2022-05-24 ENCOUNTER — HOSPITAL ENCOUNTER (OUTPATIENT)
Dept: PHYSICAL THERAPY | Age: 37
Setting detail: THERAPIES SERIES
Discharge: HOME | End: 2022-05-24
Payer: COMMERCIAL

## 2022-05-24 PROCEDURE — 97110 THERAPEUTIC EXERCISES: CPT

## 2022-05-24 PROCEDURE — 97112 NEUROMUSCULAR REEDUCATION: CPT

## 2022-05-24 NOTE — FLOWSHEET NOTE
[address] [place] [address] and [facility], [address], [facility], [place] [address]  Phone [phone]  Fax [phone]    Outpatient Physical Therapy     [x] Daily Treatment Note   [] Progress Note   [] Discharge Note    Date:  5/24/2022    Patient Name:  Anuja Pires. \"Ravindra\"       YOB: 1985    Medical Diagnosis: S/P L THR (F51.242)   Treatment Diagnosis:   L hip pain (M25.552), L hip effusion (M25.452), LE weakness, decreased flexibility, decreased ROM, decreased functional status, impaired balance     Onset Date: 3/28/22  Referral Date: 4/19/22  Referring Physician:  Ashlee Andrade PA-C/ Dr. Samantha Blanc     Visits Allowed/Insurance/Certification Information:  Felton Ozuna- 48 visits, no auth      Restrictions/Precautions:  L hip anterior THR precautions    Latex Allergy:  [x]NO      []YES    Plan of care sent to provider:   []NA   []Faxed   [x]Co-signature       Plan of care signed:    []NA   [x]Yes 5/2/22 []No      Progress report will be due (10 Rx or 30 days whichever is less): 8/46/62     Recertification will be due (POC Duration  / 90 days whichever is less): 7/27/22    Visit# / total visits:     Visit # Insurance Allowable Auth Required   In Person 7/16 50 []  Yes     [x]  No    [facility] 0  []  Yes     []  No    Total 7/16       Plan for Next Session: add  floor to waist lifts, donkey kick, fire hydrant     Subjective:   Patient reports his hip is doing pretty good overall. Planning to RTW on July 25. Performing HEP daily. Pain level: 0/10 currently, 2/10 at worst since last visit. AT EVAL: Patient describes pain to be intermittent lateral aspect L hip and thigh. Having some popping in L knee. Patient reports pain is  1/10 pain at present and  5/10 pain at its worst.  Worsened by:   Incision hurts with prolonged sitting, some discomfort with transitional movements        Objective:         8 weeks p/o on 5/23/22  Exercises:    Exercises in bold performed in department today. Items not bolded are carried forward from prior visits for continuity of the record. Exercise/Equipment Resistance/Repetitions Issued for HEP   NUSTEP  L5 x 10' Subjective taken   TA with ball squeeze 6 sec/ 2x10 x   Bridge with ball squeeze   3x10 x   sidelying hip abd  2x10, 3# x   sidelying clam shells  3x10, blue tband x   Prone hip ext 2x10, 3#  (small range) x   Prone knee flex 3x10, 3# x   Seated HS stretch 30 sec/ x3  (manual today) x   Prone quad stretch 30 sec/ x3  (manual today) x   Standing heel raises  x30 on blue foam x   Standing hip abd B 2x15 on blue foam x   Standing hip ext B 2x15 on blue foam x   forward step downs   4\" 2x15    SLS   30 sec/ x2 x   Leg press   Plate 6 1P80 B LE  Plate 3 1P47 L LE    Sidestepping with tband around lower legs 20 feet x4, blue tband    Forward Step ups 6\" x30       Lateral step ups 6\" x30      knee ext machine B LE 3 plates 5H14      Standing high marches x30 on blue foam, with ball toss on wall    Lat pull down 7 plates, 2U77    Supine piriformis stretch 30 sec/ x2, just positioning no overpressure                                                                  Therapeutic Exercise/Home Exercise Program:   X30 min. Reviewed and progressed exercises as noted above. Group Therapy:    0 minutes    Therapeutic Activity:  0 minutes     Gait: 0 minutes  Very min deviation, able to go up and down 4 steps in dept with no hand rail with good control  Improved by end of ex, cues for strong leg, heel/toe sequence    Neuromuscular Re-Education:  10 minutes  Balance training and muscle re-ed as noted above      Canalith Repositioning Procedure:  0 minutes    Manual Therapy:  5 minutes  Manual stretching as noted above    Modalities: 15 minutes- CP to L hip with patient in supine   [] GAME READY (VASO)- for significant edema, swelling, pain control.      Functional Outcome Measure:   []NA  Measure Used:  FOTO  Date Assessed:4/27/22  FOTO initial Score: 53  Predicted d/c FOTO score:  78    Assessment/Treatment/Activity Tolerance:    Patients response to treatment:   [x]Patient tolerated treatment well with no adverse reactions noted    []Patient limited by fatigue   []Patient limited by pain    []Patient limited by other medical complications   [x]Other:  Pain 0/10 after treatment    Goals:   Progress towards goals:  Goals established on 4/27/22  GOALS:  Short Term Goals: 2 weeks  1. Independent in HEP and progression per patient tolerance, in order to prevent re-injury. []? Progressing: []? Met: []? Not Met: []? Adjusted       2. Patient will have a decrease in pain to facilitate improvement in movement, function, and ADLs as indicated by Functional Deficits. []? Progressing: []? Met: []? Not Met: []? Adjusted          Long Term Goals: 8 weeks  1. Achieve d/c FOTO score of 78 or greater to assist with reaching prior level of function. []? Progressing: []? Met: []? Not Met: []? Adjusted      2. Patient will demonstrate increased AROM L hip/knee/HS equal to R LE to allow for proper joint functioning as indicated by patients Functional Deficits. []? Progressing: []? Met: []? Not Met: []? Adjusted       3. Patient will demonstrate an increase in strength to 5/5 throughout L LE to allow for proper functional mobility as indicated by patients Functional Deficits. []? Progressing: []? Met: []? Not Met: []? Adjusted       4. Patient will return to all transfers, work activities, and functional activities without increased symptoms or restriction. []? Progressing: []? Met: []? Not Met: []? Adjusted       5. Patient will have 0-2/10 pain with ADL's.  []? Progressing: []? Met: []? Not Met: []? Adjusted       6. Patient stated goal: walk community distances on varied surfaces without AD, and up/down stairs with alt pattern without rails independently. []? Progressing: []? Met: []? Not Met: []?  Adjusted    Prognosis: [x]Good []Fair   []Poor    Patient Requires Follow-up:  [x]Yes  []No    Plan: []Plan of care initiated     [x]Continue per plan of care    [] Alter current plan (see comments)    []Hold pending MD visit []Discharge    Charges:  Timed Code Treatment Minutes: 45   Total Treatment Minutes:  45   [address] time for each procedure?:  TE TIME:  NMR TIME:  MANUAL TIME:  UNTIMED MINUTES:       [] EVAL (LOW) 51161 (typically 20 minutes face-to-face)  [] EVAL (MOD) 22608 (typically 30 minutes face-to-face)  [] EVAL (HIGH) 31733 (typically 45 minutes face-to-face)  [] RE-EVAL     [x] DP(11932) x 2  [] IONTO  [x] NMR (40835) x1     [] VASO  [] Manual (19717) x     [] Other:gt  [] TA x      [] Mech Traction (54391)  [] ES(attended) (17953)     [] ES (un) (80392): Medicare Cap total YTD:   [x]N/A    Electronically signed by:  Jorge A Soto [id number]        Note: If patient does not return for scheduled/ recommended follow up visits, this note will serve as a discharge from care along with most recent update on progress.

## 2022-05-27 ENCOUNTER — HOSPITAL ENCOUNTER (OUTPATIENT)
Dept: PHYSICAL THERAPY | Age: 37
Setting detail: THERAPIES SERIES
Discharge: HOME | End: 2022-05-27
Payer: COMMERCIAL

## 2022-05-27 PROCEDURE — 97110 THERAPEUTIC EXERCISES: CPT

## 2022-05-27 PROCEDURE — 97140 MANUAL THERAPY 1/> REGIONS: CPT

## 2022-05-27 NOTE — PROGRESS NOTES
[address] [place] [address] and Sports Rehabilitation, [address]  Phone [phone]  Fax [phone]    Outpatient Physical Therapy     [x] Daily Treatment Note   [x] Progress Note 5/27/22  [] Discharge Note    Date:  5/27/2022    Patient Name:  Aline Portillo. \"Ravindra\"       YOB: 1985    Medical Diagnosis: S/P L THR (T24.233)   Treatment Diagnosis:   L hip pain (M25.552), L hip effusion (M25.452), LE weakness, decreased flexibility, decreased ROM, decreased functional status, impaired balance     Onset Date: 3/28/22  Referral Date: 4/19/22  Referring Physician:  Vikas Nava PA-C/ Dr. Alyson Atkinson     Visits Allowed/Insurance/Certification Information:  Robinson Goldberg- 48 visits, no auth      Restrictions/Precautions:  L hip anterior THR precautions    Latex Allergy:  [x]NO      []YES    Plan of care sent to provider:   []NA   []Faxed   [x]Co-signature       Plan of care signed:    []NA   [x]Yes 5/2/22 []No      Progress report will be due (10 Rx or 30 days whichever is less): 5/43/20     Recertification will be due (POC Duration  / 90 days whichever is less): 7/27/22    Visit# / total visits:     Visit # Insurance Allowable Auth Required   In Person 8/16 50 []  Yes     [x]  No    Tele Health 0  []  Yes     []  No    Total 8/16       Plan for Next Session: progress to work related tasks, floor to waist lifts, lift and carry,  donkey kick, fire hydrant, side shuffles, karyoke, agility ladder, plyometric activity     Subjective:   Patient doing great. His pain is minimal.  He has numbness around his incision in the L ant hip region. He is sleeping through the night without waking due to pain. Performing HEP daily. Patient feels he has made 70-80% improvement with PT treatment. He still limited with lifting and carrying heavy objects, quick movements and getting up from the ground, otherwise he has returned to his prior level of function. Scheduled to RTW on 7/25/22. Sees MD on 7/21/22. He power washed his house 2 days ago, and it really wore him out. Pain level: 0/10 currently, 1-2/10 at worst since last visit. AT EVAL: Patient describes pain to be intermittent lateral aspect L hip and thigh. Having some popping in L knee. Patient reports pain is  1/10 pain at present and  5/10 pain at its worst.  Worsened by: Incision hurts with prolonged sitting, some discomfort with transitional movements        Objective:   AROM:  L hip flex 110, L knee 0-136, supine HS 90/90 -34  MMT: L hip flex 4/5, abd 4/5, ext 4/5, add 4+/5, knee flex 5/5, knee ext 5/5, ankle df/pf 5/5  Gait: WNL, no AD  Stairs:  WNL alt pattern without rails      8 weeks p/o on 5/23/22  Exercises:    Exercises in bold performed in department today. Items not bolded are carried forward from prior visits for continuity of the record.   Exercise/Equipment Resistance/Repetitions Issued for HEP   NUSTEP  L5 x 10' Subjective taken   TA with ball squeeze 6 sec/ 2x10 x   Bridge with ball squeeze   3x10 x   sidelying hip abd  2x10, 3# x   sidelying clam shells  3x10, blue tband x   Prone hip ext 2x10, 3#  (small range) x   Prone knee flex 3x10, 3# x   Seated HS stretch 30 sec/ x3  (manual and standing with foot on step  today) x   Prone quad stretch 30 sec/ x3  (manual today) x   Standing heel raises  x30 on blue foam x   Standing hip abd B 2x15 on blue foam x   Standing hip ext B 2x15 on blue foam x   forward step downs   4\" 2x15    SLS   30 sec/ x2 x   Leg press   Plate 7 0R97 B LE  Plate 3 8U25 L LE    Sidestepping with tband around lower legs 20 feet x4, blue tband    Forward Step ups 8\" x30       Lateral step ups 8\" x30      knee ext machine B LE 3 plates 3C31      Standing high marches x30 on blue foam, with ball toss on wall    Lat pull down 7 plates, 3Z16    Supine piriformis stretch 30 sec/ x2, just positioning no overpressure    Floor to waist lift 25# x10 floor to waist  25# x10 floor to waist with 90 deg turn    Supine piriformis stretch 30 sec/ x3    Standing balance on BOSU x30 weight shifts  x20 squatted ball toss    Standing gastroc stretch on slant board 30 sec/ x2                                              Therapeutic Exercise/Home Exercise Program:   X45 min. Reviewed and progressed exercises as noted above. Assessment done for progress note. Instructed in body mechanics with lifting and household chores. Group Therapy:    0 minutes    Therapeutic Activity:  0 minutes     Gait: 0 minutes  Very min deviation, able to go up and down 4 steps in dept with no hand rail with good control  Improved by end of ex, cues for strong leg, heel/toe sequence    Neuromuscular Re-Education:  5 minutes  Balance training and muscle re-ed as noted above on BOSU      Canalith Repositioning Procedure:  0 minutes    Manual Therapy:  10 minutes  Manual stretching as noted above    Modalities: 15 minutes- CP to L hip with patient in supine   [] GAME READY (VASO)- for significant edema, swelling, pain control. Functional Outcome Measure:   []NA  Measure Used:  FOTO  Date Assessed:4/27/22  FOTO initial Score: 53  Predicted d/c FOTO score:  78    Assessment/Treatment/Activity Tolerance:  Patient making great progress with PT treatment. LE AROM WNL. HS tightness and hip weakness still present. Recommend a continuation of PT treatment 2x/week for another 4 weeks for further progression of his strength, flexibility and ability to perform RTW activity so that patient will be ready to RTW without limitations. Patients response to treatment:   [x]Patient tolerated treatment well with no adverse reactions noted    []Patient limited by fatigue   []Patient limited by pain    []Patient limited by other medical complications   [x]Other:  Pain 0/10 after treatment, just fatigued    Goals:   Progress towards goals:  STG's MET. LTG's 5,6 MET. Progressing towards remaining goals.    GOALS:  Short Term Goals: 2 weeks  1. Independent in HEP and progression per patient tolerance, in order to prevent re-injury. []? Progressing: [x]? Met: []? Not Met: []? Adjusted       2. Patient will have a decrease in pain to facilitate improvement in movement, function, and ADLs as indicated by Functional Deficits. []? Progressing: [x]? Met: []? Not Met: []? Adjusted          Long Term Goals: 8 weeks  1. Achieve d/c FOTO score of 78 or greater to assist with reaching prior level of function. [x]? Progressing: []? Met: []? Not Met: []? Adjusted      2. Patient will demonstrate increased AROM L hip/knee/HS equal to R LE to allow for proper joint functioning as indicated by patients Functional Deficits. [x]? Progressing: []? Met: []? Not Met: []? Adjusted       3. Patient will demonstrate an increase in strength to 5/5 throughout L LE to allow for proper functional mobility as indicated by patients Functional Deficits. [x]? Progressing: []? Met: []? Not Met: []? Adjusted       4. Patient will return to all transfers, work activities, and functional activities without increased symptoms or restriction. [x]? Progressing: []? Met: []? Not Met: []? Adjusted       5. Patient will have 0-2/10 pain with ADL's.  []? Progressing: [x]? Met: []? Not Met: []? Adjusted       6. Patient stated goal: walk community distances on varied surfaces without AD, and up/down stairs with alt pattern without rails independently. []? Progressing: [x]? Met: []? Not Met: []?  Adjusted    Prognosis: [x]Good   []Fair   []Poor    Patient Requires Follow-up:  [x]Yes  []No    Plan: []Plan of care initiated     [x]Continue per plan of care    [] Alter current plan (see comments)    []Hold pending MD visit []Discharge    Charges:  Timed Code Treatment Minutes: 60   Total Treatment Minutes:  75   [facility] time for each procedure?:  TE TIME:  NMR TIME:  MANUAL TIME:  UNTIMED MINUTES:       [] EVAL (LOW) 25440 (typically 20 minutes face-to-face)  [] EVAL (MOD) 79410 (typically 30 minutes face-to-face)  [] EVAL (HIGH) 37698 (typically 45 minutes face-to-face)  [] RE-EVAL     [x] KM(15482) x 3  [] IONTO  [] NMR (01893) x1     [] VASO  [x] Manual (85528) x1     [] Other:gt  [] TA x      [] Mech Traction (21468)  [] ES(attended) (80302)     [] ES (un) (77888): Medicare Cap total YTD:   [x]N/A    Electronically signed by:  Oxana Estrada, PT, MPT, OMT-C [id number]          Note: If patient does not return for scheduled/ recommended follow up visits, this note will serve as a discharge from care along with most recent update on progress.

## 2022-06-01 ENCOUNTER — HOSPITAL ENCOUNTER (OUTPATIENT)
Dept: PHYSICAL THERAPY | Age: 37
Setting detail: THERAPIES SERIES
Discharge: HOME | End: 2022-06-01
Payer: COMMERCIAL

## 2022-06-01 PROCEDURE — 97140 MANUAL THERAPY 1/> REGIONS: CPT

## 2022-06-01 PROCEDURE — 97110 THERAPEUTIC EXERCISES: CPT

## 2022-06-01 NOTE — PROGRESS NOTES
[address] [place] [address] and Sports Rehabilitation, [address]  Phone [phone]  Fax [phone]    Outpatient Physical Therapy     [x] Daily Treatment Note   [] Progress Note 5/27/22  [] Discharge Note    Date:  6/1/2022    Patient Name:  Nataliia Velazquez. 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 Nayeli Sibley     Visits Allowed/Insurance/Certification Information:  Ashish Albrecht- 48 visits, no auth      Restrictions/Precautions:  L hip anterior THR precautions    Latex Allergy:  [x]NO      []YES    Plan of care sent to provider:   []NA   []Faxed   [x]Co-signature       Plan of care signed:    []NA   [x]Yes 5/2/22 []No      Progress report will be due (10 Rx or 30 days whichever is less): 9/30/64     Recertification will be due (POC Duration  / 90 days whichever is less): 7/27/22    Visit# / total visits:     Visit # Insurance Allowable Auth Required   In Person 9/16 50 []  Yes     [x]  No    Tele Health 0  []  Yes     []  No    Total 9/16       Plan for Next Session: progress to work related tasks,  donkey kick, fire hydrant, side shuffles, karyoke, agility ladder, plyometric activity    Subjective:   Patient doing well. Push mowed his uncle's yard and did ok, just feels out of shape. Performing HEP daily. Pain is minimal, only hurts if he comes down hard on L leg. He leaves for vacation on Saturday. He is going to [facility] [place] and will be gone one week. He plans to return to PT when he gets home. Pain level: 0/10 currently, 1-2/10 at worst since last visit. AT EVAL: Patient describes pain to be intermittent lateral aspect L hip and thigh.   Having some popping in L knee. Patient reports pain is  1/10 pain at present and  5/10 pain at its worst.  Worsened by: Incision hurts with prolonged sitting, some discomfort with transitional movements        Objective:         9 weeks p/o on 5/30/22  Exercises:    Exercises in bold performed in department today. Items not bolded are carried forward from prior visits for continuity of the record. Exercise/Equipment Resistance/Repetitions Issued for HEP   NUSTEP  L6 x 10' Subjective taken   TA with ball squeeze 6 sec/ 2x10 x   Bridge with ball squeeze   3x10 x   sidelying hip abd  2x10, 3# x   sidelying clam shells  3x10, blue tband x   Prone hip ext 2x10, 3#  (small range) x   Prone knee flex 3x10, 3# x   Seated HS stretch 30 sec/ x3  (manual and standing with foot on step  today) x   Prone quad stretch 30 sec/ x3  (manual today) x   Standing heel raises  x30 on blue foam x   Standing hip abd B 2x15 on blue foam x   Standing hip ext B 2x15 on blue foam x   forward step downs   4\" 2x15    SLS   30 sec/ x2 x   Leg press   Plate 8 7Z14 B LE  Plate 3 2R75 L LE    Sidestepping with tband around lower legs 20 feet x4, blue tband    Forward Step ups 8\" x30 with front kick on R       Lateral step ups 8\" x30  With R leg lift with left arm lift 9#    knee ext machine B LE 3 plates 9A70      Standing high marches x30 on blue foam, with ball toss on wall    Lat pull down 8 plates, 9G67    Supine piriformis stretch 30 sec/ x2, just positioning no overpressure    Floor to waist lift 30# floor to waist lift x6  30# 30 foot carry x6         Standing balance on BOSU x30 weight shifts  Standing balance with perturbations  2x10 squats  x20 squatted ball toss    Standing gastroc stretch on slant board 30 sec/ x2    Backward lunge supporting self on L LE X10, with L UE support on // bar    Clock lunge x5                                    Therapeutic Exercise/Home Exercise Program:   X40 min.    Reviewed and progressed exercises as noted above.   Reviewed body mechanics with lifting and household chores. Group Therapy:    0 minutes    Therapeutic Activity:  0 minutes     Gait: 0 minutes  Very min deviation, able to go up and down 4 steps in dept with no hand rail with good control  Improved by end of ex, cues for strong leg, heel/toe sequence    Neuromuscular Re-Education:  5 minutes  Balance training and muscle re-ed as noted above on DORISU      Canalith Repositioning Procedure:  0 minutes    Manual Therapy:  10 minutes  Manual stretching as noted above    Modalities: 15 minutes- CP to L hip with patient in supine   [] GAME READY (VASO)- for significant edema, swelling, pain control. Functional Outcome Measure:   []NA  Measure Used:  FOTO  Date Assessed:4/27/22  FOTO initial Score: 53  Predicted d/c FOTO score:  78    Assessment/Treatment/Activity Tolerance:    Patients response to treatment:   [x]Patient tolerated treatment well with no adverse reactions noted    []Patient limited by fatigue   []Patient limited by pain    []Patient limited by other medical complications   [x]Other:  Pain 0/10 after treatment, just fatigued    Goals:   Progress towards goals:  STG's MET. LTG's 5,6 MET. Progressing towards remaining goals. GOALS:  Short Term Goals: 2 weeks  1. Independent in HEP and progression per patient tolerance, in order to prevent re-injury. []? Progressing: [x]? Met: []? Not Met: []? Adjusted       2. Patient will have a decrease in pain to facilitate improvement in movement, function, and ADLs as indicated by Functional Deficits. []? Progressing: [x]? Met: []? Not Met: []? Adjusted          Long Term Goals: 8 weeks  1. Achieve d/c FOTO score of 78 or greater to assist with reaching prior level of function. [x]? Progressing: []? Met: []? Not Met: []? Adjusted      2. Patient will demonstrate increased AROM L hip/knee/HS equal to R LE to allow for proper joint functioning as indicated by patients Functional Deficits. [x]?

## 2022-06-02 ENCOUNTER — TELEPHONE (OUTPATIENT)
Dept: ORTHOPEDIC SURGERY | Age: 37
End: 2022-06-02

## 2022-06-14 ENCOUNTER — APPOINTMENT (OUTPATIENT)
Dept: PHYSICAL THERAPY | Age: 37
End: 2022-06-14
Payer: COMMERCIAL

## 2022-06-17 ENCOUNTER — HOSPITAL ENCOUNTER (OUTPATIENT)
Dept: PHYSICAL THERAPY | Age: 37
Setting detail: THERAPIES SERIES
Discharge: HOME | End: 2022-06-17
Payer: COMMERCIAL

## 2022-06-17 PROCEDURE — 97112 NEUROMUSCULAR REEDUCATION: CPT

## 2022-06-17 PROCEDURE — 97140 MANUAL THERAPY 1/> REGIONS: CPT

## 2022-06-17 PROCEDURE — 97110 THERAPEUTIC EXERCISES: CPT

## 2022-06-17 NOTE — PROGRESS NOTES
[address] [place] [address] and Sports Rehabilitation, [address]  Phone [phone]  Fax [phone]    Outpatient Physical Therapy     [x] Daily Treatment Note   [] Progress Note 5/27/22  [] Discharge Note    Date:  6/17/2022    Patient Name:  Kalpesh Stevenson \"Ravindra\"       YOB: 1985    Medical Diagnosis: S/P L THR (W86.557)   Treatment Diagnosis:   L hip pain (M25.552), L hip effusion (M25.452), LE weakness, decreased flexibility, decreased ROM, decreased functional status, impaired balance     Onset Date: 3/28/22  Referral Date: 4/19/22  Referring Physician:  Aparna Rea PA-C/ Dr. Jo-Ann Worley     Visits Allowed/Insurance/Certification Information:  Tosha Tan- 48 visits, no auth      Restrictions/Precautions:  L hip anterior THR precautions    Latex Allergy:  [x]NO      []YES    Plan of care sent to provider:   []NA   []Faxed   [x]Co-signature       Plan of care signed:    []NA   [x]Yes 5/2/22 []No      Progress report will be due (10 Rx or 30 days whichever is less): 7/27/07     Recertification will be due (POC Duration  / 90 days whichever is less): 7/27/22    Visit# / total visits:     Visit # Insurance Allowable Auth Required   In Person 10/16 50 []  Yes     [x]  No    Tele Health 0  []  Yes     []  No    Total 10/16       Plan for Next Session: progress to work related tasks,  donkey kick, fire hydrant, side shuffles, plyometric activity    Subjective:   Patient doing well. Returned from vacation at [facility]. L hip doing great! He walked 10 blocks carrying beach supplies without significant reports of pain. He started working out more at home, and trying to lose weight. Lost 4# so far. Pain level: 0/10 currently, 1/10 at worst since last visit, lateral aspect L thigh. AT EVAL: Patient describes pain to be intermittent lateral aspect L hip and thigh. Having some popping in L knee.   Patient reports pain is  1/10 pain at present and  5/10 pain at its worst.  Worsened by: Incision hurts with prolonged sitting, some discomfort with transitional movements        Objective:         11 weeks p/o on 6/13/22  Exercises:    Exercises in bold performed in department today. Items not bolded are carried forward from prior visits for continuity of the record. Exercise/Equipment Resistance/Repetitions Issued for HEP   NUSTEP  L6 x 10' Subjective taken   TA with ball squeeze 6 sec/ 2x10 x   Bridge with ball squeeze   3x10 x   sidelying hip abd  2x10, 3# x   sidelying clam shells  3x10, blue tband x   Prone hip ext 2x10, 3#  (small range) x   Prone knee flex 3x10, 3# x   Seated HS stretch 30 sec/ x3  (manual and standing with foot on step  today) x   Prone quad stretch 30 sec/ x3  (manual today) x   Standing heel raises  x30 on blue foam x   Standing hip abd B 2x15 on blue foam x   Standing hip ext B 2x15 on blue foam x   forward step downs   4\" 2x15    SLS   30 sec/ x2 x   Leg press   Plate 8 8J61 B LE  Plate 4 8N44 L LE    Sidestepping with tband around lower legs 20 feet x4, blue tband    Forward Step ups 8\" x30 with front kick on R       Lateral step ups 8\" x30  With R leg lift with left arm lift 9#    knee ext machine B LE 4 plates 6J31      Standing high marches x30 on blue foam, with ball toss on wall    Lat pull down 8 plates, 2C36    Supine piriformis stretch 30 sec/ x2,     Floor to waist lift 30# floor to waist lift x6  30# 30 foot carry x6         Standing balance on BOSU x30 weight shifts  Standing balance with perturbations  2x10 squats  x20 squatted ball toss    Standing gastroc stretch on slant board 30 sec/ x2    Backward lunge supporting self on L LE X5, no arm support    Clock lunge x5                                    Therapeutic Exercise/Home Exercise Program:   X20 min. Reviewed and progressed exercises as noted above. Reviewed body mechanics with lifting and household chores.     Group Therapy:    0 minutes    Therapeutic Activity:  0 minutes     Gait: 0 minutes  Very min deviation, able to go up and down 4 steps in dept with no hand rail with good control  Improved by end of ex, cues for strong leg, heel/toe sequence    Neuromuscular Re-Education: 15  minutes  Agility ladder (4 reps of each):  In/outs forward and lateral, diagonals, high knees forward and lateral, hopscotch landing on L, B hops in every square and then skipping every other square, lateral hops between 2 squares 2x10      Canalith Repositioning Procedure:  0 minutes    Manual Therapy:  10 minutes  Manual stretching as noted above    Modalities: 15 minutes- CP to L hip with patient in supine   [] GAME READY (VASO)- for significant edema, swelling, pain control. Functional Outcome Measure:   []NA  Measure Used:  FOTO  Date Assessed:4/27/22  FOTO initial Score: 53  Predicted d/c FOTO score:  78    Assessment/Treatment/Activity Tolerance:    Patients response to treatment:   [x]Patient tolerated treatment well with no adverse reactions noted    []Patient limited by fatigue   []Patient limited by pain    []Patient limited by other medical complications   [x]Other:  Pain 0/10 after treatment, just fatigued    Goals:   Progress towards goals:  STG's MET. LTG's 5,6 MET. Progressing towards remaining goals. GOALS:  Short Term Goals: 2 weeks  1. Independent in HEP and progression per patient tolerance, in order to prevent re-injury. []? Progressing: [x]? Met: []? Not Met: []? Adjusted       2. Patient will have a decrease in pain to facilitate improvement in movement, function, and ADLs as indicated by Functional Deficits. []? Progressing: [x]? Met: []? Not Met: []? Adjusted          Long Term Goals: 8 weeks  1. Achieve d/c FOTO score of 78 or greater to assist with reaching prior level of function. [x]? Progressing: []? Met: []? Not Met: []? Adjusted      2.  Patient will demonstrate increased AROM L hip/knee/HS equal to R LE to allow for proper joint functioning as indicated by patients Functional Deficits. [x]? Progressing: []? Met: []? Not Met: []? Adjusted       3. Patient will demonstrate an increase in strength to 5/5 throughout L LE to allow for proper functional mobility as indicated by patients Functional Deficits. [x]? Progressing: []? Met: []? Not Met: []? Adjusted       4. Patient will return to all transfers, work activities, and functional activities without increased symptoms or restriction. [x]? Progressing: []? Met: []? Not Met: []? Adjusted       5. Patient will have 0-2/10 pain with ADL's.  []? Progressing: [x]? Met: []? Not Met: []? Adjusted       6. Patient stated goal: walk community distances on varied surfaces without AD, and up/down stairs with alt pattern without rails independently. []? Progressing: [x]? Met: []? Not Met: []? Adjusted    Prognosis: [x]Good   []Fair   []Poor    Patient Requires Follow-up:  [x]Yes  []No    Plan: []Plan of care initiated     [x]Continue per plan of care    [] Alter current plan (see comments)    []Hold pending MD visit []Discharge    Charges:  Timed Code Treatment Minutes: 45   Total Treatment Minutes:  60   BWC time for each procedure?:  TE TIME:  NMR TIME:  MANUAL TIME:  UNTIMED MINUTES:       [] EVAL (LOW) 57074 (typically 20 minutes face-to-face)  [] EVAL (MOD) 87584 (typically 30 minutes face-to-face)  [] EVAL (HIGH) 76662 (typically 45 minutes face-to-face)  [] RE-EVAL     [x] SN(68149) x 1  [] IONTO  [x] NMR (77382) x1     [] VASO  [x] Manual (79542) x1     [] Other:gt  [] TA x      [] Mech Traction (70649)  [] ES(attended) (49254)     [] ES (un) (20962): Medicare Cap total YTD:   [x]N/A    Electronically signed by:  Yfn Briceño, PT, MPT, OMT-C [id number]          Note: If patient does not return for scheduled/ recommended follow up visits, this note will serve as a discharge from care along with most recent update on progress.

## 2022-06-21 ENCOUNTER — HOSPITAL ENCOUNTER (OUTPATIENT)
Dept: PHYSICAL THERAPY | Age: 37
Setting detail: THERAPIES SERIES
Discharge: HOME | End: 2022-06-21
Payer: COMMERCIAL

## 2022-06-21 PROCEDURE — 97110 THERAPEUTIC EXERCISES: CPT

## 2022-06-21 PROCEDURE — 97112 NEUROMUSCULAR REEDUCATION: CPT

## 2022-06-21 PROCEDURE — 97140 MANUAL THERAPY 1/> REGIONS: CPT

## 2022-06-21 NOTE — FLOWSHEET NOTE
[address] [place] [address] and Sports Rehabilitation, [address]  Phone [phone]  Fax [phone]    Outpatient Physical Therapy     [x] Daily Treatment Note   [] Progress Note 5/27/22  [] Discharge Note    Date:  6/21/2022    Patient Name:  Jorge Larson \"Ravindra\"       YOB: 1985    Medical Diagnosis: S/P L THR (D95.047)   Treatment Diagnosis:   L hip pain (M25.552), L hip effusion (M25.452), LE weakness, decreased flexibility, decreased ROM, decreased functional status, impaired balance     Onset Date: 3/28/22  Referral Date: 4/19/22  Referring Physician:  Dolly Ramos PA-C/ Dr. Razia Hubbard     Visits Allowed/Insurance/Certification Information:  [facility]- 48 visits, no auth      Restrictions/Precautions:  L hip anterior THR precautions    Latex Allergy:  [x]NO      []YES    Plan of care sent to provider:   []NA   []Faxed   [x]Co-signature       Plan of care signed:    []NA   [x]Yes 5/2/22 []No      Progress report will be due (10 Rx or 30 days whichever is less): 4/30/71     Recertification will be due (POC Duration  / 90 days whichever is less): 7/27/22    Visit# / total visits:     Visit # Insurance Allowable Auth Required   In Person 11/16 50 []  Yes     [x]  No    Tele Health 0  []  Yes     []  No    Total 11/16       Plan for Next Session: progress to work related tasks,  side shuffles, plyometric activity    Subjective:   Patient doing well. Pain level: 0/10 currently, 1/10 at worst since last visit, lateral aspect L thigh. AT EVAL: Patient describes pain to be intermittent lateral aspect L hip and thigh. Having some popping in L knee. Patient reports pain is  1/10 pain at present and  5/10 pain at its worst.  Worsened by: Incision hurts with prolonged sitting, some discomfort with transitional movements        Objective:         11 weeks p/o on 6/13/22  Exercises:    Exercises in bold performed in department today. Items not bolded are carried forward from prior visits for continuity of the record. Exercise/Equipment Resistance/Repetitions Issued for HEP   NUSTEP  L6 x 10' Subjective taken   TA with ball squeeze 6 sec/ 2x10 x   Bridge with ball squeeze   3x10 x   sidelying hip abd  2x10, 3# x   sidelying clam shells  3x10, blue tband x   Prone hip ext 2x10, 3#  (small range) x   Prone knee flex 3x10, 3# x   Seated HS stretch 30 sec/ x3  (manual and standing with foot on step  today) x   Prone quad stretch 30 sec/ x3  (manual today) x   Standing heel raises  x30 on blue foam x   Standing hip abd B 2x15 on blue foam x   Standing hip ext B 2x15 on blue foam x   forward step downs   4\" 2x15    SLS   30 sec/ x2 x   Leg press   Plate 8 4F64 B LE  Plate 4 7P38 L LE    Sidestepping with tband around lower legs 20 feet x4, blue tband x   Forward Step ups 8\" x30 with front kick on R       Lateral step ups 8\" x30  With R leg lift with left arm lift 9#    knee ext machine B LE 4 plates 8Q05      Standing high marches x30 on blue foam, with ball toss on wall    Lat pull down 8 plates, 8X14    Supine piriformis stretch 30 sec/ x2,  x   Floor to waist lift 30# floor to waist lift x6  30# 30 foot carry x6         Standing balance on BOSU x30 weight shifts  Standing balance with perturbations  2x10 squats  x20 squatted ball toss    Standing gastroc stretch on slant board 30 sec/ x2    Backward lunge supporting self on L LE X5, no arm support x   Clock lunge x5 x   donkey kick 2x10 x   fire hydrant 2x10 x                         Therapeutic Exercise/Home Exercise Program:   X20 min. Reviewed and progressed exercises as noted above.        Group Therapy:    0 minutes    Therapeutic Activity:  0 minutes     Gait: 0 minutes  Very min deviation, able to go up and down 4 steps in dept with no hand rail with good control  Improved by end of ex, cues for strong leg, heel/toe sequence    Neuromuscular Re-Education: 15  minutes  Agility ladder (4 reps of each):  In/outs forward and lateral, diagonals, high knees forward and lateral, hopscotch landing on L, B hops in every square and then skipping every other square, lateral hops between 2 squares 2x10      Canalith Repositioning Procedure:  0 minutes    Manual Therapy:  10 minutes  Manual stretching as noted above    Modalities: 15 minutes- CP to L hip with patient in supine   [] GAME READY (VASO)- for significant edema, swelling, pain control. Functional Outcome Measure:   []NA  Measure Used:  FOTO  Date Assessed:4/27/22  FOTO initial Score: 53  Predicted d/c FOTO score:  78    Assessment/Treatment/Activity Tolerance:    Patients response to treatment:   [x]Patient tolerated treatment well with no adverse reactions noted    []Patient limited by fatigue   []Patient limited by pain    []Patient limited by other medical complications   [x]Other:  Pain 0/10 after treatment, just fatigued    Goals:   Progress towards goals:  STG's MET. LTG's 5,6 MET. Progressing towards remaining goals. GOALS:  Short Term Goals: 2 weeks  1. Independent in HEP and progression per patient tolerance, in order to prevent re-injury. []? Progressing: [x]? Met: []? Not Met: []? Adjusted       2. Patient will have a decrease in pain to facilitate improvement in movement, function, and ADLs as indicated by Functional Deficits. []? Progressing: [x]? Met: []? Not Met: []? Adjusted          Long Term Goals: 8 weeks  1. Achieve d/c FOTO score of 78 or greater to assist with reaching prior level of function. [x]? Progressing: []? Met: []? Not Met: []? Adjusted      2. Patient will demonstrate increased AROM L hip/knee/HS equal to R LE to allow for proper joint functioning as indicated by patients Functional Deficits. [x]? Progressing: []? Met: []? Not Met: []? Adjusted       3.  Patient will demonstrate an increase in strength to 5/5 throughout L LE to allow for proper functional mobility as indicated by patients Functional Deficits. [x]? Progressing: []? Met: []? Not Met: []? Adjusted       4. Patient will return to all transfers, work activities, and functional activities without increased symptoms or restriction. [x]? Progressing: []? Met: []? Not Met: []? Adjusted       5. Patient will have 0-2/10 pain with ADL's.  []? Progressing: [x]? Met: []? Not Met: []? Adjusted       6. Patient stated goal: walk community distances on varied surfaces without AD, and up/down stairs with alt pattern without rails independently. []? Progressing: [x]? Met: []? Not Met: []? Adjusted    Prognosis: [x]Good   []Fair   []Poor    Patient Requires Follow-up:  [x]Yes  []No    Plan: []Plan of care initiated     [x]Continue per plan of care    [] Alter current plan (see comments)    []Hold pending MD visit []Discharge    Charges:  Timed Code Treatment Minutes: 45   Total Treatment Minutes:  45+cp   BWC time for each procedure?:  TE TIME:  NMR TIME:  MANUAL TIME:  UNTIMED MINUTES:       [] EVAL (LOW) 54875 (typically 20 minutes face-to-face)  [] EVAL (MOD) 08551 (typically 30 minutes face-to-face)  [] EVAL (HIGH) 72707 (typically 45 minutes face-to-face)  [] RE-EVAL     [x] THEODORA(41040) x 1  [] IONTO  [x] NMR (38000) x1     [] VASO  [x] Manual (86827) x1     [] Other:gt  [] TA x      [] Mech Traction (11734)  [] ES(attended) (63473)     [] ES (un) (40349): Medicare Cap total YTD:   [x]N/A    Electronically signed by:  Valentino Sings, [id number]          Note: If patient does not return for scheduled/ recommended follow up visits, this note will serve as a discharge from care along with most recent update on progress.

## 2022-06-22 ENCOUNTER — TELEPHONE (OUTPATIENT)
Dept: ORTHOPEDIC SURGERY | Age: 37
End: 2022-06-22

## 2022-06-24 ENCOUNTER — HOSPITAL ENCOUNTER (OUTPATIENT)
Dept: PHYSICAL THERAPY | Age: 37
Setting detail: THERAPIES SERIES
Discharge: HOME | End: 2022-06-24
Payer: COMMERCIAL

## 2022-06-24 NOTE — FLOWSHEET NOTE
[address] [place] [address] [facility] [address]  Phone [phone]  Fax [phone]    Physical Therapy  Cancellation/No-show Note  Patient Name:  Mike Esquivel :  1985   Date:  2022    Cancelled visits to date: 2  No-shows to date: 0    For today's appointment patient:  [x]  Cancelled  []  Rescheduled appointment  []  No-show     Reason given by patient:  [x]  Patient ill  []  Conflicting appointment  []  No transportation    []  Conflict with work  []  No reason given  []  Other:     Comments:    Phone call information:   []  Phone call made today to patient at     am/pm at the number provided:      []  Patient answered, conversation as follows:    []  Patient did not answer, message left as follows:  [x]  Phone call not needed - pt contacted us to cancel and provided reason for cancellation.      Electronically signed by:  Alejo Foster, PT, MPT, OMT-C [id number]

## 2022-06-28 ENCOUNTER — HOSPITAL ENCOUNTER (OUTPATIENT)
Dept: PHYSICAL THERAPY | Age: 37
Setting detail: THERAPIES SERIES
Discharge: HOME | End: 2022-06-28
Payer: COMMERCIAL

## 2022-06-28 PROCEDURE — 97140 MANUAL THERAPY 1/> REGIONS: CPT

## 2022-06-28 PROCEDURE — 97110 THERAPEUTIC EXERCISES: CPT

## 2022-06-28 NOTE — FLOWSHEET NOTE
[address] [facility], [address]  Phone [phone]  Fax [phone]    Outpatient Physical Therapy     [x] Daily Treatment Note   [] Progress Note 5/27/22  [] Discharge Note    Date:  6/28/2022    Patient Name:  Sharron Sandhoff. \"Ravindra\"       YOB: 1985    Medical Diagnosis: S/P L THR (W22.131)   Treatment Diagnosis:   L hip pain (M25.552), L hip effusion (M25.452), LE weakness, decreased flexibility, decreased ROM, decreased functional status, impaired balance     Onset Date: 3/28/22  Referral Date: 4/19/22  Referring Physician:  Jorge A Corona PA-C/ Dr. Anayeli Camejo     Visits Allowed/Insurance/Certification Information:  [facility]      Restrictions/Precautions:  L hip anterior THR precautions    Latex Allergy:  [x]NO      []YES    Plan of care sent to provider:   []NA   []Faxed   [x]Co-signature       Plan of care signed:    []NA   [x]Yes 5/2/22 []No      Progress report will be due (10 Rx or 30 days whichever is less): 0/67/11     Recertification will be due (POC Duration  / 90 days whichever is less): 7/27/22    Visit# / total visits:     Visit # Insurance Allowable Auth Required   In Person 12/16 50 []  Yes     [x]  No    Tele Health 0  []  Yes     []  No    Total 12/16       Plan for Next Session: progress to work related tasks,  side shuffles, plyometric activity    Subjective:   Patient doing well with his pain,  Report emotionally he is not feeling good today due to losing his dog. Pain level: 0/10 currently, 1/10 at worst since last visit, lateral aspect L thigh. AT EVAL: Patient describes pain to be intermittent lateral aspect L hip and thigh. Having some popping in L knee. Patient reports pain is  1/10 pain at present and  5/10 pain at its worst.  Worsened by:   Incision hurts with prolonged sitting, some discomfort with transitional movements        Objective:         13 weeks p/o on 6/27/22  Exercises:    Exercises in bold performed in department today. Items not bolded are carried forward from prior visits for continuity of the record. Exercise/Equipment Resistance/Repetitions Issued for HEP   NUSTEP  L6 x 10' Subjective taken   TA with ball squeeze 6 sec/ 2x10 x   Bridge with ball squeeze   3x10 x   sidelying hip abd  2x10, 3# x   sidelying clam shells  3x10, blue tband x   Prone hip ext 2x10, 3#  (small range) x   Prone knee flex 3x10, 3# x   Seated HS stretch 30 sec/ x3  (manual and standing with foot on step  today) x   Prone quad stretch 30 sec/ x3  (manual today) x   Standing heel raises  x30 on blue foam x   Standing hip abd B 2x15 on blue foam x   Standing hip ext B 2x15 on blue foam x   forward step downs   4\" 2x15    SLS   30 sec/ x2 x   Leg press   Plate 8 6K48 B LE  Plate 4 5U50 L LE    Sidestepping with tband around lower legs 20 feet x4, blue tband x   Forward Step ups 8\" x30 with front kick on R       Lateral step ups 8\" x30  With R leg lift with left arm lift 9#    knee ext machine B LE 4 plates 5M02      Standing high marches x30 on blue foam, with ball toss on wall    Lat pull down 8 plates, 1N85    Supine piriformis stretch 30 sec/ x2,  x   Floor to waist lift 30# floor to waist lift x 8  30# 40 foot carry x 5         Standing balance on BOSU x30 weight shifts  Standing balance with perturbations  2x10 squats  x20 squatted ball toss    Standing gastroc stretch on slant board 30 sec/ x2    Backward lunge supporting self on L LE X5, no arm support x   Clock lunge x5 x   donkey kick 2x10 x   fire hydrant 2x10 x                         Therapeutic Exercise/Home Exercise Program:   X 28 min. Reviewed and progressed exercises as noted above.        Group Therapy:    0 minutes    Therapeutic Activity:  0 minutes     Gait: 0 minutes  Very min deviation, able to go up and down 4 steps in dept with no hand rail with good control  Improved by end of ex, cues for strong leg, heel/toe sequence    Neuromuscular Re-Education: requested not today   minutes  Agility ladder (4 reps of each):  In/outs forward and lateral, diagonals, high knees forward and lateral, hopscotch landing on L, B hops in every square and then skipping every other square, lateral hops between 2 squares 2x10      Canalith Repositioning Procedure:  0 minutes    Manual Therapy:  10 minutes  Manual stretching as noted above    Modalities:  minutes- CP to L hip with patient in supine   [] GAME READY (VASO)- for significant edema, swelling, pain control. Functional Outcome Measure:   []NA  Measure Used:  FOTO  Date Assessed:4/27/22  FOTO initial Score: 53  Predicted d/c FOTO score:  78    Assessment/Treatment/Activity Tolerance:    Patients response to treatment:   [x]Patient tolerated treatment well with no adverse reactions noted    []Patient limited by fatigue   []Patient limited by pain    []Patient limited by other medical complications   [x]Other:  Pain 0/10 after treatment, just fatigued    Goals:   Progress towards goals:  STG's MET. LTG's 5,6 MET. Progressing towards remaining goals. GOALS:  Short Term Goals: 2 weeks  1. Independent in HEP and progression per patient tolerance, in order to prevent re-injury. []? Progressing: [x]? Met: []? Not Met: []? Adjusted       2. Patient will have a decrease in pain to facilitate improvement in movement, function, and ADLs as indicated by Functional Deficits. []? Progressing: [x]? Met: []? Not Met: []? Adjusted          Long Term Goals: 8 weeks  1. Achieve d/c FOTO score of 78 or greater to assist with reaching prior level of function. [x]? Progressing: []? Met: []? Not Met: []? Adjusted      2. Patient will demonstrate increased AROM L hip/knee/HS equal to R LE to allow for proper joint functioning as indicated by patients Functional Deficits. [x]? Progressing: []? Met: []? Not Met: []? Adjusted       3.  Patient will demonstrate an increase in strength to 5/5 throughout L LE to allow for proper functional mobility as indicated by patients Functional Deficits. [x]? Progressing: []? Met: []? Not Met: []? Adjusted       4. Patient will return to all transfers, work activities, and functional activities without increased symptoms or restriction. [x]? Progressing: []? Met: []? Not Met: []? Adjusted       5. Patient will have 0-2/10 pain with ADL's.  []? Progressing: [x]? Met: []? Not Met: []? Adjusted       6. Patient stated goal: walk community distances on varied surfaces without AD, and up/down stairs with alt pattern without rails independently. []? Progressing: [x]? Met: []? Not Met: []? Adjusted    Prognosis: [x]Good   []Fair   []Poor    Patient Requires Follow-up:  [x]Yes  []No    Plan: []Plan of care initiated     [x]Continue per plan of care    [] Alter current plan (see comments)    []Hold pending MD visit []Discharge    Charges:  Timed Code Treatment Minutes: 38   Total Treatment Minutes:  38   B[facility] time for each procedure?:  TE TIME:  NMR TIME:  MANUAL TIME:  UNTIMED MINUTES:       [] EVAL (LOW) 95886 (typically 20 minutes face-to-face)  [] EVAL (MOD) 97351 (typically 30 minutes face-to-face)  [] EVAL (HIGH) 14868 (typically 45 minutes face-to-face)  [] RE-EVAL     [x] EO(48697) x 2  [] IONTO  [] NMR (15718) x1     [] VASO  [x] Manual (03599) x1     [] Other:gt  [] TA x      [] Mech Traction (46773)  [] ES(attended) (63006)     [] ES (un) (14701): Medicare Cap total YTD:   [x]N/A    Electronically signed by:  FRED [id number]          Note: If patient does not return for scheduled/ recommended follow up visits, this note will serve as a discharge from care along with most recent update on progress.

## 2022-06-30 ENCOUNTER — HOSPITAL ENCOUNTER (OUTPATIENT)
Dept: PHYSICAL THERAPY | Age: 37
Setting detail: THERAPIES SERIES
Discharge: HOME | End: 2022-06-30
Payer: COMMERCIAL

## 2022-06-30 PROCEDURE — 97110 THERAPEUTIC EXERCISES: CPT

## 2022-06-30 NOTE — FLOWSHEET NOTE
[address] [facility], [address]  Phone [phone]  Fax [phone]    Outpatient Physical Therapy     [x] Daily Treatment Note   [] Progress Note 5/27/22  [x] Discharge Note 6/30/22    Date:  6/30/2022    Patient Name:  Salvador Ravi \"Ravindra\"       YOB: 1985    Medical Diagnosis: S/P L THR (Z70.960)   Treatment Diagnosis:   L hip pain (M25.552), L hip effusion (M25.452), LE weakness, decreased flexibility, decreased ROM, decreased functional status, impaired balance     Onset Date: 3/28/22  Referral Date: 4/19/22  Referring Physician:  Kamilah Victor PA-C/ Dr. Quinn June     Visits Allowed/Insurance/Certification Information:  [facility]- 48 visits, no auth      Restrictions/Precautions:  L hip anterior THR precautions    Latex Allergy:  [x]NO      []YES    Plan of care sent to provider:   []NA   []Faxed   [x]Co-signature       Plan of care signed:    []NA   [x]Yes 5/2/22, 6/7/22 []No      Progress report will be due (10 Rx or 30 days whichever is less): 8/21/72     Recertification will be due (POC Duration  / 90 days whichever is less): 7/27/22    Visit# / total visits:     Visit # Insurance Allowable Auth Required   In Person 13/16 50 []  Yes     [x]  No    [facility] 0  []  Yes     []  No    Total 13/16       Plan for Next Session:  Continue with HEP independently    Subjective:   Patient doing great! He reports 100% improvement with PT treatment. He has returned to his prior level of function at home for household chores and yard work. He sees the MD on 7/21/22, and he plans be released to RTW at that time. He is sleeping through the night without waking due to pain. He is performing his HEP daily. He is in agreement with d/c from PT at this time. Pain level: 0/10 currently, 1/10 at worst since last visit, lateral aspect L thigh, only if he bumps his L hip into something.   AT EVAL: Patient describes pain to be intermittent lateral aspect L hip and thigh. Having some popping in L knee. Patient reports pain is  1/10 pain at present and  5/10 pain at its worst.  Worsened by: Incision hurts with prolonged sitting, some discomfort with transitional movements        Objective:   AROM:  L hip flex 118, L knee 0-138, supine HS 90/90 -25  MMT:  5/5 throughout L LE  Gait: WNL, no AD  Stairs:  WNL alt pattern without rails      13 weeks p/o on 6/27/22  Exercises:    Exercises in bold performed in department today. Items not bolded are carried forward from prior visits for continuity of the record.   Exercise/Equipment Resistance/Repetitions Issued for HEP   NUSTEP  L5 x 10' Subjective taken   TA with ball squeeze 6 sec/ 2x10 x   Bridge with ball squeeze   3x10 x   sidelying hip abd  2x10, 3# x   sidelying clam shells  3x10, blue tband x   Prone hip ext 2x10, 3#  (small range) x   Prone knee flex 3x10, 3# x   Seated HS stretch 30 sec/ x3  (manual and standing with foot on step  today) x   Prone quad stretch 30 sec/ x3  (manual today) x   Standing heel raises  x30 on blue foam x   Standing hip abd B 2x15 on blue foam x   Standing hip ext B 2x15 on blue foam x   forward step downs   4\" 2x15    SLS   30 sec/ x2 x   Leg press   Plate 8 0Y53 B LE  Plate 4 0P29 L LE    Sidestepping with tband around lower legs 20 feet x4, blue tband x   Forward Step ups 8\" x30 with front kick on R       Lateral step ups 8\" x30  With R leg lift with left arm lift 9#    knee ext machine B LE 4 plates 0E45      Standing high marches x30 on blue foam, with ball toss on wall    Lat pull down 9 plates, 8N68    Supine piriformis stretch 30 sec/ x2,  x   Floor to waist lift 30# floor to waist lift x 8  30# 40 foot carry x 5         Standing balance on BOSU x30 weight shifts  Standing balance with perturbations  2x10 squats  x20 squatted ball toss    Standing gastroc stretch on slant board 30 sec/ x2    Backward lunge supporting self on L LE X5, no arm support x   Clock lunge x5 x   donkey kick 2x10 x   fire hydrant 2x10 x                         Therapeutic Exercise/Home Exercise Program:   X 38 min. Reviewed exercises as noted above. Patient demonstrates good understanding and compliance with HEP and body mechanics. Assessment done for d/c note. Group Therapy:    0 minutes    Therapeutic Activity:  0 minutes     Gait: 0 minutes  Very min deviation, able to go up and down 4 steps in dept with no hand rail with good control  Improved by end of ex, cues for strong leg, heel/toe sequence    Neuromuscular Re-Education: 0  minutes  Agility ladder (4 reps of each):  In/outs forward and lateral, diagonals, high knees forward and lateral, hopscotch landing on L, B hops in every square and then skipping every other square, lateral hops between 2 squares 2x10      Canalith Repositioning Procedure:  0 minutes    Manual Therapy:  0 minutes  Manual stretching as noted above    Modalities:  minutes- CP to L hip with patient in supine   [] GAME READY (VASO)- for significant edema, swelling, pain control. Functional Outcome Measure:   []NA  Measure Used:  FOTO  Date Assessed:4/27/22 6/30/22  FOTO initial Score: 53  Predicted d/c FOTO score:  78   99    Assessment/Treatment/Activity Tolerance:  Patient made great progress with PT treatment. LE AROM, strength, flexibility, gait, and balance WNL. Patient has returned to prior level of function at home without restrictions. Patient feels he will be ready to RTW once released by MD at next visit. Demonstrates good compliance and understanding of HEP, body mechanics, and hip precautions. All goals MET. No further PT treatment needed at this time. D/c from PT.    Patients response to treatment:   [x]Patient tolerated treatment well with no adverse reactions noted    []Patient limited by fatigue   []Patient limited by pain    []Patient limited by other medical complications   [x]Other:  Pain 0/10 after treatment    Goals:   Progress towards goals: All goals MET. GOALS:  Short Term Goals: 2 weeks  1. Independent in HEP and progression per patient tolerance, in order to prevent re-injury. []? Progressing: [x]? Met: []? Not Met: []? Adjusted       2. Patient will have a decrease in pain to facilitate improvement in movement, function, and ADLs as indicated by Functional Deficits. []? Progressing: [x]? Met: []? Not Met: []? Adjusted          Long Term Goals: 8 weeks  1. Achieve d/c FOTO score of 78 or greater to assist with reaching prior level of function. []? Progressing: [x]? Met: []? Not Met: []? Adjusted      2. Patient will demonstrate increased AROM L hip/knee/HS equal to R LE to allow for proper joint functioning as indicated by patients Functional Deficits. []? Progressing: [x]? Met: []? Not Met: []? Adjusted       3. Patient will demonstrate an increase in strength to 5/5 throughout L LE to allow for proper functional mobility as indicated by patients Functional Deficits. []? Progressing: [x]? Met: []? Not Met: []? Adjusted       4. Patient will return to all transfers, work activities, and functional activities without increased symptoms or restriction. []? Progressing: [x]? Met: []? Not Met: []? Adjusted       5. Patient will have 0-2/10 pain with ADL's.  []? Progressing: [x]? Met: []? Not Met: []? Adjusted       6. Patient stated goal: walk community distances on varied surfaces without AD, and up/down stairs with alt pattern without rails independently. []? Progressing: [x]? Met: []? Not Met: []?  Adjusted    Prognosis: [x]Good   []Fair   []Poor    Patient Requires Follow-up:  []Yes  [x]No    Plan: []Plan of care initiated     []Continue per plan of care    [] Alter current plan (see comments)    []Hold pending MD visit [x]Discharge    Charges:  Timed Code Treatment Minutes: 38   Total Treatment Minutes:  38   [facility] time for each procedure?:  TE TIME:  NMR TIME:  MANUAL TIME:  UNTIMED MINUTES:       [] EVAL (LOW) 48872 (typically 20 minutes face-to-face)  [] EVAL (MOD) 20559 (typically 30 minutes face-to-face)  [] EVAL (HIGH) 37258 (typically 45 minutes face-to-face)  [] RE-EVAL     [x] XA(20677) x 3  [] IONTO  [] NMR (83510) x1     [] VASO  [] Manual (36890) x1     [] Other:gt  [] TA x      [] Mech Traction (31398)  [] ES(attended) (98138)     [] ES (un) (80283): Medicare Cap total YTD:   [x]N/A    Electronically signed by:  Citlaly Johnson, PT, MPT, OMT-C [id number]            Note: If patient does not return for scheduled/ recommended follow up visits, this note will serve as a discharge from care along with most recent update on progress.

## 2022-07-12 DIAGNOSIS — R60.0 LOWER EXTREMITY EDEMA: ICD-10-CM

## 2022-07-12 RX ORDER — HYDROCHLOROTHIAZIDE 25 MG/1
25 TABLET ORAL EVERY MORNING
Qty: 90 TABLET | Refills: 1 | Status: SHIPPED | OUTPATIENT
Start: 2022-07-12 | End: 2022-08-28

## 2022-07-12 NOTE — TELEPHONE ENCOUNTER
Refill Request     CONFIRM preferrred pharmacy with the patient. If Mail Order Rx - Pend for 90 day refill. Last Seen: Last Seen Department: 3/7/2022  Last Seen by PCP: 3/7/2022    Last Written: 05/11/2022 90 Tablet 1 Refill    Next Appointment:   Future Appointments   Date Time Provider Odin Rodarte   7/14/2022  8:30 AM Lisa Hernandez MD [facility]       Message to  to schedule appointment. Return in about 6 months (around 8/11/2022) for HTN.         Requested Prescriptions     Pending Prescriptions Disp Refills    hydroCHLOROthiazide (HYDRODIURIL) 25 MG tablet 90 tablet 1     Sig: Take 1 tablet by mouth every morning

## 2022-07-14 ENCOUNTER — OFFICE VISIT (OUTPATIENT)
Dept: ORTHOPEDIC SURGERY | Age: 37
End: 2022-07-14
Payer: COMMERCIAL

## 2022-07-14 VITALS — BODY MASS INDEX: 37.2 KG/M2 | HEIGHT: 67 IN | WEIGHT: 237 LBS

## 2022-07-14 DIAGNOSIS — Z96.642 STATUS POST TOTAL HIP REPLACEMENT, LEFT: Primary | ICD-10-CM

## 2022-07-14 PROCEDURE — 99212 OFFICE O/P EST SF 10 MIN: CPT | Performed by: ORTHOPAEDIC SURGERY

## 2022-07-14 NOTE — PROGRESS NOTES
Dr Hunter Members      Date /Time 7/14/2022       11:39 AM EDT  Name Kati Kerr.             1985   Location  [facility]  MRN [id number]                Chief Complaint   Patient presents with    Follow-up     Left LIZETT  SX 03/28/2022        History of Present Illness      Kati Huerta is a 29 y.o. male is here for post-op visit after LEFT  27377 Total Hip Arthroplasty      Patient presents to the office today for a follow-up visit. Patient 3+ months status post left anterior total hip arthroplasty. Doing well at this time. Pain controlled. Denies any fever chills or drainage    Physical Exam    Based off 1997 Exam Criteria    Ht 5' 7\" (1.702 m)   Wt 237 lb (107.5 kg)   BMI 37.12 kg/m²      Constitutional:       General: He is not in acute distress. Appearance: Normal appearance. LEFT Hip: incision clean, intact, healing appropriately. Mild skin irritation proximal incision. No surrounding  erythema or fluctuance. Neuro intact distal. No evidence of DVT. Imaging       Left Hip: [facility]  Radiographs: X-rays were ordered today and reviewed of the left hip.  3 views. AP pelvis, lateral, and false profile. They demonstrate a left total hip arthroplasty in good position. No evidence of loosening or periprosthetic fracture. Assessment and Plan    Radha Metcalf was seen today for follow-up. Diagnoses and all orders for this visit:    Status post total hip replacement, left  -     XR HIP LEFT (2-3 VIEWS); Future        Patient is doing well. Patient will continue with home exercises. We have discussed predental and preinvasive procedure antibiotics. Patient will follow up 1 year from surgery or sooner if problems arise. Electronically signed by Elsa Mora MD on 7/14/2022 at 11:28 AM  This dictation was generated by voice recognition computer software.   Although all attempts are made to edit the dictation for accuracy, there may be errors in the transcription that are not intended.

## 2022-08-26 ENCOUNTER — OFFICE VISIT (OUTPATIENT)
Dept: FAMILY MEDICINE CLINIC | Age: 37
End: 2022-08-26
Payer: COMMERCIAL

## 2022-08-26 ENCOUNTER — HOSPITAL ENCOUNTER (OUTPATIENT)
Age: 37
Discharge: HOME | End: 2022-08-26
Payer: COMMERCIAL

## 2022-08-26 ENCOUNTER — HOSPITAL ENCOUNTER (OUTPATIENT)
Dept: GENERAL RADIOLOGY | Age: 37
Discharge: HOME | End: 2022-08-26
Payer: COMMERCIAL

## 2022-08-26 VITALS
DIASTOLIC BLOOD PRESSURE: 70 MMHG | SYSTOLIC BLOOD PRESSURE: 108 MMHG | OXYGEN SATURATION: 98 % | BODY MASS INDEX: 39.16 KG/M2 | HEART RATE: 75 BPM | WEIGHT: 250 LBS

## 2022-08-26 DIAGNOSIS — I10 ESSENTIAL HYPERTENSION: ICD-10-CM

## 2022-08-26 DIAGNOSIS — R29.818 SUSPECTED SLEEP APNEA: Primary | ICD-10-CM

## 2022-08-26 DIAGNOSIS — R06.02 SHORTNESS OF BREATH: ICD-10-CM

## 2022-08-26 DIAGNOSIS — R60.9 SWELLING: ICD-10-CM

## 2022-08-26 DIAGNOSIS — I10 ESSENTIAL HYPERTENSION: Primary | ICD-10-CM

## 2022-08-26 LAB
BASOPHILS ABSOLUTE: 0.1 K/UL (ref 0–0.2)
BASOPHILS RELATIVE PERCENT: 0.9 %
EOSINOPHILS ABSOLUTE: 0.1 K/UL (ref 0–0.6)
EOSINOPHILS RELATIVE PERCENT: 2 %
HCT VFR BLD CALC: 44.5 % (ref 40.5–52.5)
HEMOGLOBIN: 15.5 G/DL (ref 13.5–17.5)
LYMPHOCYTES ABSOLUTE: 2.5 K/UL (ref 1–5.1)
LYMPHOCYTES RELATIVE PERCENT: 37.7 %
MCH RBC QN AUTO: 32.3 PG (ref 26–34)
MCHC RBC AUTO-ENTMCNC: 34.9 G/DL (ref 31–36)
MCV RBC AUTO: 92.4 FL (ref 80–100)
MONOCYTES ABSOLUTE: 0.5 K/UL (ref 0–1.3)
MONOCYTES RELATIVE PERCENT: 8.1 %
NEUTROPHILS ABSOLUTE: 3.4 K/UL (ref 1.7–7.7)
NEUTROPHILS RELATIVE PERCENT: 51.3 %
PDW BLD-RTO: 14.3 % (ref 12.4–15.4)
PLATELET # BLD: 216 K/UL (ref 135–450)
PMV BLD AUTO: 8.9 FL (ref 5–10.5)
RBC # BLD: 4.81 M/UL (ref 4.2–5.9)
WBC # BLD: 6.5 K/UL (ref 4–11)

## 2022-08-26 PROCEDURE — 90715 TDAP VACCINE 7 YRS/> IM: CPT | Performed by: PHYSICIAN ASSISTANT

## 2022-08-26 PROCEDURE — 71046 X-RAY EXAM CHEST 2 VIEWS: CPT

## 2022-08-26 PROCEDURE — 99214 OFFICE O/P EST MOD 30 MIN: CPT | Performed by: PHYSICIAN ASSISTANT

## 2022-08-26 PROCEDURE — 90471 IMMUNIZATION ADMIN: CPT | Performed by: PHYSICIAN ASSISTANT

## 2022-08-26 SDOH — ECONOMIC STABILITY: TRANSPORTATION INSECURITY
IN THE PAST 12 MONTHS, HAS THE LACK OF TRANSPORTATION KEPT YOU FROM MEDICAL APPOINTMENTS OR FROM GETTING MEDICATIONS?: NO

## 2022-08-26 SDOH — ECONOMIC STABILITY: HOUSING INSECURITY: IN THE LAST 12 MONTHS, HOW MANY PLACES HAVE YOU LIVED?: 0

## 2022-08-26 SDOH — ECONOMIC STABILITY: HOUSING INSECURITY
IN THE LAST 12 MONTHS, WAS THERE A TIME WHEN YOU DID NOT HAVE A STEADY PLACE TO SLEEP OR SLEPT IN A SHELTER (INCLUDING NOW)?: NO

## 2022-08-26 SDOH — ECONOMIC STABILITY: TRANSPORTATION INSECURITY
IN THE PAST 12 MONTHS, HAS LACK OF TRANSPORTATION KEPT YOU FROM MEETINGS, WORK, OR FROM GETTING THINGS NEEDED FOR DAILY LIVING?: NO

## 2022-08-26 SDOH — ECONOMIC STABILITY: INCOME INSECURITY: IN THE LAST 12 MONTHS, WAS THERE A TIME WHEN YOU WERE NOT ABLE TO PAY THE MORTGAGE OR RENT ON TIME?: NO

## 2022-08-26 SDOH — ECONOMIC STABILITY: FOOD INSECURITY: WITHIN THE PAST 12 MONTHS, YOU WORRIED THAT YOUR FOOD WOULD RUN OUT BEFORE YOU GOT MONEY TO BUY MORE.: NEVER TRUE

## 2022-08-26 SDOH — ECONOMIC STABILITY: FOOD INSECURITY: WITHIN THE PAST 12 MONTHS, THE FOOD YOU BOUGHT JUST DIDN'T LAST AND YOU DIDN'T HAVE MONEY TO GET MORE.: NEVER TRUE

## 2022-08-26 ASSESSMENT — SOCIAL DETERMINANTS OF HEALTH (SDOH): HOW HARD IS IT FOR YOU TO PAY FOR THE VERY BASICS LIKE FOOD, HOUSING, MEDICAL CARE, AND HEATING?: NOT HARD AT ALL

## 2022-08-26 ASSESSMENT — PATIENT HEALTH QUESTIONNAIRE - PHQ9
8. MOVING OR SPEAKING SO SLOWLY THAT OTHER PEOPLE COULD HAVE NOTICED. OR THE OPPOSITE, BEING SO FIGETY OR RESTLESS THAT YOU HAVE BEEN MOVING AROUND A LOT MORE THAN USUAL: 0
SUM OF ALL RESPONSES TO PHQ QUESTIONS 1-9: 0
3. TROUBLE FALLING OR STAYING ASLEEP: 0
9. THOUGHTS THAT YOU WOULD BE BETTER OFF DEAD, OR OF HURTING YOURSELF: 0
5. POOR APPETITE OR OVEREATING: 0
2. FEELING DOWN, DEPRESSED OR HOPELESS: 0
SUM OF ALL RESPONSES TO PHQ QUESTIONS 1-9: 0
7. TROUBLE CONCENTRATING ON THINGS, SUCH AS READING THE NEWSPAPER OR WATCHING TELEVISION: 0
SUM OF ALL RESPONSES TO PHQ QUESTIONS 1-9: 0
6. FEELING BAD ABOUT YOURSELF - OR THAT YOU ARE A FAILURE OR HAVE LET YOURSELF OR YOUR FAMILY DOWN: 0
SUM OF ALL RESPONSES TO PHQ9 QUESTIONS 1 & 2: 0
10. IF YOU CHECKED OFF ANY PROBLEMS, HOW DIFFICULT HAVE THESE PROBLEMS MADE IT FOR YOU TO DO YOUR WORK, TAKE CARE OF THINGS AT HOME, OR GET ALONG WITH OTHER PEOPLE: 0
4. FEELING TIRED OR HAVING LITTLE ENERGY: 0
DEPRESSION UNABLE TO ASSESS: FUNCTIONAL CAPACITY MOTIVATION LIMITS ACCURACY
1. LITTLE INTEREST OR PLEASURE IN DOING THINGS: 0
SUM OF ALL RESPONSES TO PHQ QUESTIONS 1-9: 0

## 2022-08-26 ASSESSMENT — ENCOUNTER SYMPTOMS
WHEEZING: 0
SHORTNESS OF BREATH: 1
COUGH: 0
CHEST TIGHTNESS: 0

## 2022-08-26 NOTE — PROGRESS NOTES
Emma Amaral. (:  1985) is a 40 y.o. male,Established patient, here for evaluation of the following chief complaint(s):  Hypertension (Follow up ) and Edema         ASSESSMENT/PLAN:  1. Essential hypertension  -     Comprehensive Metabolic Panel; Future  -     Blood pressure is well controlled, continue with current dose of lisinopril and HCTZ. Follow up with me in 6 months  2. Swelling  -     Comprehensive Metabolic Panel; Future  -     TSH; Future  -     T4, Free; Future  -     Brain Natriuretic Peptide; Future  -     If negative lab work we will consider stopping HCTZ and starting him on Lasix. 3. Shortness of breath  -     XR CHEST STANDARD (2 VW); Future  -     CBC with Auto Differential; Future  -     Iron and TIBC; Future  -     abnormal chest sounds on the right side of chest. Will review xray. If negative could consider a PFT and CT of the lungs. If BNP is positive, may be related and an echo will be ordered    Return in about 6 months (around 2023) for HTN. Subjective   SUBJECTIVE/OBJECTIVE:  HPI  HTN:  Current medication: HCTZ and lisinopril  Side effects: none  Home blood pressure readings: 130/80  Reports: swelling in hands and legs  Denies: chest pain  Swelling started  after returning from work from a brief period off  Risk factors: works in the heat  Does not follow a low sodium diet    Shortness of breath  New symptom that has developed over the last few months  Occurs mostly with exertion- pt suspects that he has deconditioned due to a lack of exercise  He denies any heart palpitations, cough, chest pain  Has not tried anything other than rest to improve symptoms  There is no known history of asthma  Risk factors: obesity  States he wakes up sometimes from sleep and feels like he can't breathe, does have daytime fatigue      Review of Systems   Constitutional:  Negative for activity change, appetite change, fatigue, fever and unexpected weight change. Respiratory:  Positive for shortness of breath. Negative for cough, chest tightness and wheezing. Cardiovascular:  Positive for leg swelling. Negative for chest pain and palpitations. Musculoskeletal:         Swelling of hands   Skin:         Skin discoloration, occurred on left hand for the first time   Neurological:  Negative for dizziness, light-headedness and headaches. Hematological:  Negative for adenopathy. Does not bruise/bleed easily. Objective   Physical Exam  Vitals reviewed. Constitutional:       Appearance: Normal appearance. He is obese. HENT:      Head: Normocephalic and atraumatic. Cardiovascular:      Rate and Rhythm: Normal rate and regular rhythm. Heart sounds: Normal heart sounds. Pulmonary:      Effort: Pulmonary effort is normal.      Breath sounds: Examination of the right-upper field reveals rhonchi. Examination of the right-middle field reveals rhonchi. Examination of the right-lower field reveals rhonchi. Rhonchi present. Musculoskeletal:      Right hand: Swelling present. No tenderness. Normal range of motion. Normal strength. Left hand: Swelling present. No tenderness. Normal range of motion. Normal strength. Right ankle: Swelling present. No tenderness. Normal range of motion. Left ankle: Swelling present. No tenderness. Normal range of motion. Skin:     Findings: No erythema. Neurological:      Mental Status: He is alert and oriented to person, place, and time. Cranial Nerves: No cranial nerve deficit. An electronic signature was used to authenticate this note.     --LYDIA Jane

## 2022-08-27 LAB
A/G RATIO: 1.9 (ref 1.1–2.2)
ALBUMIN SERPL-MCNC: 4.3 G/DL (ref 3.4–5)
ALP BLD-CCNC: 63 U/L (ref 40–129)
ALT SERPL-CCNC: 36 U/L (ref 10–40)
ANION GAP SERPL CALCULATED.3IONS-SCNC: 14 MMOL/L (ref 3–16)
AST SERPL-CCNC: 17 U/L (ref 15–37)
BILIRUB SERPL-MCNC: 0.3 MG/DL (ref 0–1)
BUN BLDV-MCNC: 15 MG/DL (ref 7–20)
CALCIUM SERPL-MCNC: 8.9 MG/DL (ref 8.3–10.6)
CHLORIDE BLD-SCNC: 102 MMOL/L (ref 99–110)
CO2: 23 MMOL/L (ref 21–32)
CREAT SERPL-MCNC: 1 MG/DL (ref 0.9–1.3)
GFR AFRICAN AMERICAN: >60
GFR NON-AFRICAN AMERICAN: >60
GLUCOSE BLD-MCNC: 122 MG/DL (ref 70–99)
IRON SATURATION: 28 % (ref 20–50)
IRON: 84 UG/DL (ref 59–158)
POTASSIUM SERPL-SCNC: 3.9 MMOL/L (ref 3.5–5.1)
PRO-BNP: 44 PG/ML (ref 0–124)
SODIUM BLD-SCNC: 139 MMOL/L (ref 136–145)
T4 FREE: 1.1 NG/DL (ref 0.9–1.8)
TOTAL IRON BINDING CAPACITY: 300 UG/DL (ref 260–445)
TOTAL PROTEIN: 6.6 G/DL (ref 6.4–8.2)
TSH SERPL DL<=0.05 MIU/L-ACNC: 1.03 UIU/ML (ref 0.27–4.2)

## 2022-08-28 DIAGNOSIS — R06.02 SHORTNESS OF BREATH: Primary | ICD-10-CM

## 2022-08-28 RX ORDER — FUROSEMIDE 20 MG/1
20 TABLET ORAL DAILY
Qty: 90 TABLET | Refills: 1 | Status: SHIPPED | OUTPATIENT
Start: 2022-08-28

## 2022-08-28 RX ORDER — POTASSIUM CHLORIDE 750 MG/1
10 TABLET, EXTENDED RELEASE ORAL DAILY
Qty: 90 TABLET | Refills: 1 | Status: SHIPPED | OUTPATIENT
Start: 2022-08-28

## 2022-09-02 ENCOUNTER — HOSPITAL ENCOUNTER (OUTPATIENT)
Dept: PULMONOLOGY | Age: 37
Discharge: HOME | End: 2022-09-02
Payer: COMMERCIAL

## 2022-09-02 VITALS — OXYGEN SATURATION: 96 %

## 2022-09-02 DIAGNOSIS — R06.02 SHORTNESS OF BREATH: ICD-10-CM

## 2022-09-02 LAB
DLCO %PRED: 94 %
DLCO PRED: NORMAL
DLCO/VA %PRED: NORMAL
DLCO/VA PRED: NORMAL
DLCO/VA: NORMAL
DLCO: NORMAL
EXPIRATORY TIME-POST: NORMAL
EXPIRATORY TIME: NORMAL
FEF 25-75% %CHNG: NORMAL
FEF 25-75% %PRED-POST: NORMAL
FEF 25-75% %PRED-PRE: NORMAL
FEF 25-75% PRED: NORMAL
FEF 25-75%-POST: NORMAL
FEF 25-75%-PRE: NORMAL
FEV1 %PRED-POST: 103 %
FEV1 %PRED-PRE: 93 %
FEV1 PRED: NORMAL
FEV1-POST: NORMAL
FEV1-PRE: NORMAL
FEV1/FVC %PRED-POST: NORMAL
FEV1/FVC %PRED-PRE: NORMAL
FEV1/FVC PRED: NORMAL
FEV1/FVC-POST: 78 %
FEV1/FVC-PRE: 74 %
FVC %PRED-POST: 106 L
FVC %PRED-PRE: 102 %
FVC PRED: NORMAL
FVC-POST: NORMAL
FVC-PRE: NORMAL
GAW %PRED: NORMAL
GAW PRED: NORMAL
GAW: NORMAL
IC %PRED: NORMAL
IC PRED: NORMAL
IC: NORMAL
MEP: NORMAL
MIP: NORMAL
MVV %PRED-PRE: NORMAL
MVV PRED: NORMAL
MVV-PRE: NORMAL
PEF %PRED-POST: NORMAL
PEF %PRED-PRE: NORMAL
PEF PRED: NORMAL
PEF%CHNG: NORMAL
PEF-POST: NORMAL
PEF-PRE: NORMAL
RAW %PRED: NORMAL
RAW PRED: NORMAL
RAW: NORMAL
RV %PRED: NORMAL
RV PRED: NORMAL
RV: NORMAL
SVC %PRED: NORMAL
SVC PRED: NORMAL
SVC: NORMAL
TLC %PRED: 99 %
TLC PRED: NORMAL
TLC: NORMAL
VA %PRED: NORMAL
VA PRED: NORMAL
VA: NORMAL
VTG %PRED: NORMAL
VTG PRED: NORMAL
VTG: NORMAL

## 2022-09-02 PROCEDURE — 6370000000 HC RX 637 (ALT 250 FOR IP): Performed by: PHYSICIAN ASSISTANT

## 2022-09-02 PROCEDURE — 94729 DIFFUSING CAPACITY: CPT

## 2022-09-02 PROCEDURE — 94060 EVALUATION OF WHEEZING: CPT

## 2022-09-02 PROCEDURE — 94760 N-INVAS EAR/PLS OXIMETRY 1: CPT

## 2022-09-02 PROCEDURE — 94726 PLETHYSMOGRAPHY LUNG VOLUMES: CPT

## 2022-09-02 RX ORDER — ALBUTEROL SULFATE 90 UG/1
4 AEROSOL, METERED RESPIRATORY (INHALATION) ONCE
Status: COMPLETED | OUTPATIENT
Start: 2022-09-02 | End: 2022-09-02

## 2022-09-02 RX ADMIN — Medication 4 PUFF: at 09:32

## 2022-09-02 ASSESSMENT — PULMONARY FUNCTION TESTS
FEV1_PERCENT_PREDICTED_PRE: 93
FVC_PERCENT_PREDICTED_PRE: 102
FEV1/FVC_PRE: 74
FEV1/FVC_POST: 78
FVC_PERCENT_PREDICTED_POST: 106
FEV1_PERCENT_PREDICTED_POST: 103

## 2022-09-07 DIAGNOSIS — J45.20 MILD INTERMITTENT ASTHMA WITHOUT COMPLICATION: ICD-10-CM

## 2022-09-07 RX ORDER — ALBUTEROL SULFATE 90 UG/1
2 AEROSOL, METERED RESPIRATORY (INHALATION) 4 TIMES DAILY PRN
Qty: 18 G | Refills: 5 | Status: SHIPPED | OUTPATIENT
Start: 2022-09-07

## 2022-09-07 NOTE — PROCEDURES
[address] [facility]                 [facility]                               PULMONARY FUNCTION    PATIENT NAME: Odilia Jacob              :        1985  MED REC NO:   [id number]                          ROOM:  ACCOUNT NO:   [de-identified]                           ADMIT DATE: 2022  PROVIDER:     Florentino Ortiz MD    DATE OF PROCEDURE:  2022    PFT    Spirometry showed FVC 4.98 L, 102% predicted, FEV1 3.66 L, 93%  predicted, with FEV1/FVC ratio of 74%. There was borderline response to  bronchodilator. Lung volumes showed air trapping, diffusion capacity  was normal.    IMPRESSION:  PFT suggests a mild obstructive process such as asthma,  clinical correlation is recommended.         Hattie Peñaloza MD    D: 2022 16:40:57       T: 2022 16:43:14     [email or web address]  Job#: [id number]     Doc#: [id number]    CC:  LYDIA Teixeira

## 2022-11-03 DIAGNOSIS — I10 ESSENTIAL HYPERTENSION: ICD-10-CM

## 2022-11-03 RX ORDER — LISINOPRIL 10 MG/1
TABLET ORAL
Qty: 90 TABLET | Refills: 1 | Status: SHIPPED | OUTPATIENT
Start: 2022-11-03

## 2022-11-08 ENCOUNTER — TELEMEDICINE (OUTPATIENT)
Dept: PRIMARY CARE CLINIC | Age: 37
End: 2022-11-08
Payer: COMMERCIAL

## 2022-11-08 DIAGNOSIS — J45.901 ACUTE BRONCHITIS WITH ASTHMA WITH ACUTE EXACERBATION: Primary | ICD-10-CM

## 2022-11-08 DIAGNOSIS — J20.9 ACUTE BRONCHITIS WITH ASTHMA WITH ACUTE EXACERBATION: Primary | ICD-10-CM

## 2022-11-08 PROCEDURE — 99213 OFFICE O/P EST LOW 20 MIN: CPT | Performed by: NURSE PRACTITIONER

## 2022-11-08 RX ORDER — DOXYCYCLINE HYCLATE 100 MG
100 TABLET ORAL 2 TIMES DAILY
Qty: 14 TABLET | Refills: 0 | Status: SHIPPED | OUTPATIENT
Start: 2022-11-08 | End: 2022-11-15

## 2022-11-08 RX ORDER — PREDNISONE 20 MG/1
40 TABLET ORAL DAILY
Qty: 10 TABLET | Refills: 0 | Status: SHIPPED | OUTPATIENT
Start: 2022-11-08 | End: 2022-11-13

## 2022-11-08 RX ORDER — DEXTROMETHORPHAN HYDROBROMIDE AND PROMETHAZINE HYDROCHLORIDE 15; 6.25 MG/5ML; MG/5ML
5 SYRUP ORAL 2 TIMES DAILY PRN
Qty: 100 ML | Refills: 0 | Status: SHIPPED | OUTPATIENT
Start: 2022-11-08 | End: 2022-11-15

## 2022-11-08 RX ORDER — BENZONATATE 200 MG/1
200 CAPSULE ORAL 3 TIMES DAILY PRN
Qty: 30 CAPSULE | Refills: 0 | Status: SHIPPED | OUTPATIENT
Start: 2022-11-08 | End: 2022-11-15

## 2022-11-08 ASSESSMENT — ENCOUNTER SYMPTOMS
CHEST TIGHTNESS: 1
SORE THROAT: 1
SHORTNESS OF BREATH: 0
WHEEZING: 1
GASTROINTESTINAL NEGATIVE: 1
COUGH: 1

## 2022-11-08 NOTE — PROGRESS NOTES
2022    TELEHEALTH EVALUATION -- Audio/Visual (During VVLUO-33 public health emergency)  Chief Complaint   Patient presents with    Congestion    Cough     Dark yellow/brown color    Fever    Chills    Pharyngitis     With cough     HPI:    Gurinder Portillo. (:  1985) has requested an audio/video evaluation for the following concern(s):    Symptoms have been ongoing for 5 days and no improvement with use of OTC Mucinex and Nyquil. Cough is productive dark yellow/brown. Cough keeping him up harriet night. Slight wheezing does have history of Asthma and encouraged to use inhaler. Low grade fever/chills and sweats cough worse at night and keeps him up so he is fatigued. Throat sore due to coughing so much. Needs work letter and was sent in My Chart. Review of Systems   Constitutional:  Positive for chills, fatigue and fever. HENT:  Positive for congestion, postnasal drip and sore throat. Respiratory:  Positive for cough, chest tightness and wheezing. Negative for shortness of breath. Cardiovascular: Negative. Gastrointestinal: Negative. Genitourinary: Negative. Musculoskeletal: Negative. Neurological:  Positive for headaches. Psychiatric/Behavioral:  Positive for sleep disturbance. Prior to Visit Medications    Medication Sig Taking?  Authorizing Provider   doxycycline hyclate (VIBRA-TABS) 100 MG tablet Take 1 tablet by mouth 2 times daily for 7 days Yes DEREK Burrell CNP   predniSONE (DELTASONE) 20 MG tablet Take 2 tablets by mouth daily for 5 days Yes DEREK Burrell CNP   promethazine-dextromethorphan (PROMETHAZINE-DM) 6.25-15 MG/5ML syrup Take 5 mLs by mouth 2 times daily as needed for Cough Take in the AM and at bedtime Yes DEREK Burrell CNP   benzonatate (TESSALON) 200 MG capsule Take 1 capsule by mouth 3 times daily as needed for Cough Yes DEREK Burrell CNP   lisinopril (PRINIVIL;ZESTRIL) 10 MG tablet TAKE 1 TABLET BY MOUTH EVERY DAY LYDIA Curtis   albuterol sulfate HFA (VENTOLIN HFA) 108 (90 Base) MCG/ACT inhaler Inhale 2 puffs into the lungs 4 times daily as needed for Wheezing  LYDIA Curtis   furosemide (LASIX) 20 MG tablet Take 1 tablet by mouth daily  LYDIA Curtis   potassium chloride (KLOR-CON M) 10 MEQ extended release tablet Take 1 tablet by mouth daily  LYDIA Curtis   aspirin EC 81 MG EC tablet Take 1 tablet by mouth 2 times daily  Nandini Bautista PA-C   famotidine (PEPCID) 10 MG tablet Take 10 mg by mouth 2 times daily as needed  Historical Provider, MD       Social History     Tobacco Use    Smoking status: Former     Packs/day: 0.25     Types: Cigarettes     Quit date: 2022     Years since quittin.7    Smokeless tobacco: Current     Types: Snuff   Vaping Use    Vaping Use: Never used   Substance Use Topics    Alcohol use: Yes     Alcohol/week: 10.0 standard drinks     Types: 10 Cans of beer per week     Comment: 3-4 X WEEKLY    Drug use: Not Currently     Types: Marijuana Seabron Barban)        Allergies   Allergen Reactions    Cefepime Itching and Swelling     Lip swelling.     ,   Past Medical History:   Diagnosis Date    Arthritis     H/O degenerative disc disease     Hypertension     Mild intermittent asthma without complication 3576    Osteoarthritis     Perthes disease     RIGHT HIP   ,   Past Surgical History:   Procedure Laterality Date    HIP SURGERY Right     TOTAL HIP ARTHROPLASTY Left 3/28/2022    LEFT TOTAL HIP ARTHROPLASTY, MINIMALLY INVASIVE DIRECT ANTERIOR WITH FASCIAILIACA BLOCK FOR PAIN CONTROL               Bert Miller performed by Haley Nieto MD at [facility]   ,   Social History     Tobacco Use    Smoking status: Former     Packs/day: 0.25     Types: Cigarettes     Quit date: 2022     Years since quittin.7    Smokeless tobacco: Current     Types: Snuff   Vaping Use    Vaping Use: Never used   Substance Use Topics    Alcohol use:  Yes     Alcohol/week: 10.0 standard drinks     Types: 10 Cans of beer per week     Comment: 3-4 X WEEKLY    Drug use: Not Currently     Types: Marijuana Erasto Ford)   ,   Family History   Problem Relation Age of Onset    Diabetes Mother     High Cholesterol Mother     High Blood Pressure Mother     High Blood Pressure Father     High Cholesterol Father     Cancer Father         skin cancer    Heart Attack Maternal Aunt     Cancer Maternal Aunt         stomach cancer    Heart Attack Maternal Uncle     Alcohol Abuse Paternal Uncle     Stroke Maternal Grandfather     Heart Attack Maternal Grandfather    ,   Immunization History   Administered Date(s) Administered    Tdap (Boostrix, Adacel) 08/26/2022       PHYSICAL EXAMINATION:  [ INSTRUCTIONS:  \"[x]\" Indicates a positive item  \"[]\" Indicates a negative item  -- DELETE ALL ITEMS NOT EXAMINED]  Vital Signs: (As obtained by patient/caregiver or practitioner observation)    Blood pressure-  Heart rate-    Respiratory rate-    Temperature-  Pulse oximetry-     Constitutional: [x] Appears well-developed and well-nourished [x] No apparent distress      [x] Abnormal- appears ill  Mental status  [x] Alert and awake  [x] Oriented to person/place/time [x]Able to follow commands      Eyes:  EOM    [x]  Normal  [] Abnormal-  Sclera  [x]  Normal  [] Abnormal -         Discharge [x]  None visible  [] Abnormal -    HENT:   [x] Normocephalic, atraumatic.   [] Abnormal   [] Mouth/Throat: Mucous membranes are moist.     External Ears [x] Normal  [] Abnormal-     Neck: [x] No visualized mass     Pulmonary/Chest: [x] Respiratory effort normal.  [x] No visualized signs of difficulty breathing or respiratory distress        [] Abnormal-      Musculoskeletal:   [] Normal gait with no signs of ataxia         [x] Normal range of motion of neck        [] Abnormal-       Neurological:        [x] No Facial Asymmetry (Cranial nerve 7 motor function) (limited exam to video visit)          [x] No gaze palsy        [] Abnormal-         Skin: [x] No significant exanthematous lesions or discoloration noted on facial skin         [] Abnormal-            Psychiatric:       [x] Normal Affect [] No Hallucinations        [] Abnormal-     Other pertinent observable physical exam findings-     ASSESSMENT/PLAN:  1. Acute bronchitis with asthma with acute exacerbation  Notify office if you do not improve or have worsening of condition. Take medication as prescribed. Take in haler for Asthma as prescribed. May use Cepacol lozenges, or chloraseptic spray. Drink plenty of fluids and rest.  Practice good hand hygiene. May sleep with humidifier as needed. Gargle with warm salt water 3-4 times a day to help with sore throat. Warm tea with honey. You can use ice, warm chicken noodle soup, soft foods, popsicles and cough drops to help soothe your throat. May take Tylenol/Ibuprofen (alternate) as needed for fever/pain. Do not eat or drink after anyone. Do not share utensils. Cover your mouth and nose when you cough or sneeze. Follow up with PCP in 3-4 days if not improving or sooner if worsening. Please refer to educational handout with AVS.    - doxycycline hyclate (VIBRA-TABS) 100 MG tablet; Take 1 tablet by mouth 2 times daily for 7 days  Dispense: 14 tablet; Refill: 0  - predniSONE (DELTASONE) 20 MG tablet; Take 2 tablets by mouth daily for 5 days  Dispense: 10 tablet; Refill: 0  - promethazine-dextromethorphan (PROMETHAZINE-DM) 6.25-15 MG/5ML syrup; Take 5 mLs by mouth 2 times daily as needed for Cough Take in the AM and at bedtime  Dispense: 100 mL; Refill: 0  - benzonatate (TESSALON) 200 MG capsule; Take 1 capsule by mouth 3 times daily as needed for Cough  Dispense: 30 capsule; Refill: 0    Return if symptoms worsen or fail to improve. Dena Garcia., was evaluated through a synchronous (real-time) audio-video encounter.  The patient (or guardian if applicable) is aware that this is a billable service, which includes applicable co-pays. This Virtual Visit was conducted with patient's (and/or legal guardian's) consent. The visit was conducted pursuant to the emergency declaration under the [address] [place] [address], [address] [place] [address] and the [employer] and [employer] General Act. Patient identification was verified, and a caregiver was present when appropriate. The patient was located at Home: [address] [place] [address]. Provider was located at Other: HOME:FL . Total time spent on this encounter:  20 minutes    --DEERK Neville CNP on 11/8/2022 at 1:17 PM    An electronic signature was used to authenticate this note.

## 2022-11-08 NOTE — LETTER
[phone]  [facility] [address]  Phone: [phone]  Fax: [phone]    DEREK Carvajal CNP        November 8, 2022     Patient: Rodney Rider. YOB: 1985   Date of Visit: 11/8/2022       To Whom It May Concern: It is my medical opinion that Wellington Osgood may return to work on 11/10/2022. If you have any questions or concerns, please don't hesitate to call.     Sincerely,          DEREK Carvajal CNP

## 2022-11-08 NOTE — LETTER
I had the pleasure of seeing Cody Pro today for a primary care virtualist video visit secondary to Bronchitis and Asthma exacerbation. I have provided the following recommendations: see note. I have included my note for your review and have asked the patient to follow up with you in 3-4 days if no improvement or sooner if worsening. If you have questions, please reach out via Telebit secure messaging by searching for the [facility] Primary [facility] Virtualists. Your communication will be answered promptly by the Virtualist on service for the day. Additionally, we would love your overall feedback on this visit. Please hit shift and click the following link to let us know if the Virtualist service met your expectations. LocalFormerly [email or web address]. [email or web address]      Electronically signed by DEREK Marks CNP on 11/8/22 at 1:16 PM EST.

## 2022-11-08 NOTE — PATIENT INSTRUCTIONS
Notify office if you do not improve or have worsening of condition. Take medication as prescribed. Take in haler for Asthma as prescribed. May use Cepacol lozenges, or chloraseptic spray. Drink plenty of fluids and rest.  Practice good hand hygiene. May sleep with humidifier as needed. Gargle with warm salt water 3-4 times a day to help with sore throat. Warm tea with honey. You can use ice, warm chicken noodle soup, soft foods, popsicles and cough drops to help soothe your throat. May take Tylenol/Ibuprofen (alternate) as needed for fever/pain. Do not eat or drink after anyone. Do not share utensils. Cover your mouth and nose when you cough or sneeze. Follow up with PCP in 3-4 days if not improving or sooner if worsening.   Please refer to educational handout with AVS.

## 2022-11-20 ENCOUNTER — PATIENT MESSAGE (OUTPATIENT)
Dept: FAMILY MEDICINE CLINIC | Age: 37
End: 2022-11-20

## 2022-11-21 RX ORDER — FUROSEMIDE 20 MG/1
20 TABLET ORAL DAILY
Qty: 90 TABLET | Refills: 1 | Status: SHIPPED | OUTPATIENT
Start: 2022-11-21

## 2022-11-21 NOTE — TELEPHONE ENCOUNTER
From: Rodney Rider. To: Everette Avalos  Sent: 11/20/2022 9:59 AM EST  Subject: Lasix    Hi Karlee, Im almost out of the Lasix pills. Was that something you wanted me to take from here on out; or just to get the fluid off? My swelling is pretty much gone; a lot better then it was. So I didnt know if I needed to continue taking it or switch back to my other water pill, or if I could quit taking it all together. Thanks.

## 2022-12-22 ENCOUNTER — TELEPHONE (OUTPATIENT)
Dept: ORTHOPEDIC SURGERY | Age: 37
End: 2022-12-22

## 2022-12-22 NOTE — TELEPHONE ENCOUNTER
Same Day Appt CX    Appointment time:  9:15 AM       SAME DAY CX. DUE TO SICKNESS.  WILL CALL BACK TO RESCHEDULE

## 2023-01-05 ENCOUNTER — TELEMEDICINE (OUTPATIENT)
Dept: FAMILY MEDICINE CLINIC | Age: 38
End: 2023-01-05
Payer: COMMERCIAL

## 2023-01-05 DIAGNOSIS — L03.119 RECURRENT CELLULITIS OF LOWER EXTREMITY: Primary | ICD-10-CM

## 2023-01-05 PROCEDURE — 99214 OFFICE O/P EST MOD 30 MIN: CPT | Performed by: PHYSICIAN ASSISTANT

## 2023-01-05 RX ORDER — CLINDAMYCIN HYDROCHLORIDE 300 MG/1
300 CAPSULE ORAL 4 TIMES DAILY
Qty: 40 CAPSULE | Refills: 0 | Status: SHIPPED | OUTPATIENT
Start: 2023-01-05 | End: 2023-01-15

## 2023-01-05 RX ORDER — HYDROCHLOROTHIAZIDE 25 MG/1
TABLET ORAL
COMMUNITY
Start: 2022-11-04

## 2023-01-05 ASSESSMENT — ENCOUNTER SYMPTOMS
BACK PAIN: 0
COLOR CHANGE: 1
SHORTNESS OF BREATH: 0

## 2023-01-05 NOTE — PROGRESS NOTES
2023    TELEHEALTH EVALUATION -- Audio/Visual (During PEJLD-62 public health emergency)    HPI:    Cloyde Gaucher. (:  1985) has requested an audio/video evaluation for the following concern(s):    The pt is here for evaluation of cellulitis  This is a recurring problem that he hasn't had for the last 6 months  Symptoms started two weeks ago on the bottom of his foot, traveled to a spot on his calf and now above the knee on the right lateral thigh  Current symptoms: redness, warmth, inguinal lymphadenopathy  Denies any current calf pain, redness or swelling  He saw rheum at [facility] who wanted him to see Dr. Jhon Baldwin who specializes in inflammatory skin conditions. He was unable to get in but would like to reach out now      Review of Systems   Constitutional:  Negative for fatigue and fever. Respiratory:  Negative for shortness of breath. Cardiovascular:  Positive for leg swelling. Negative for chest pain and palpitations. Musculoskeletal:  Negative for arthralgias and back pain. Skin:  Positive for color change and rash. Hematological:  Positive for adenopathy. Prior to Visit Medications    Medication Sig Taking?  Authorizing Provider   hydroCHLOROthiazide (HYDRODIURIL) 25 MG tablet TAKE 1 TABLET BY MOUTH EVERY DAY IN THE MORNING Yes Historical Provider, MD   clindamycin (CLEOCIN) 300 MG capsule Take 1 capsule by mouth 4 times daily for 10 days Yes LYDIA Jane   furosemide (LASIX) 20 MG tablet Take 1 tablet by mouth daily Yes LYDIA Jane   lisinopril (PRINIVIL;ZESTRIL) 10 MG tablet TAKE 1 TABLET BY MOUTH EVERY DAY Yes LYDIA Ruiz   albuterol sulfate HFA (VENTOLIN HFA) 108 (90 Base) MCG/ACT inhaler Inhale 2 puffs into the lungs 4 times daily as needed for Wheezing Yes LYDIA Jane   potassium chloride (KLOR-CON M) 10 MEQ extended release tablet Take 1 tablet by mouth daily Yes LYDIA Jane   aspirin EC 81 MG EC tablet Take 1 tablet by mouth 2 times daily Yes Sree Elaine PA-C   famotidine (PEPCID) 10 MG tablet Take 10 mg by mouth 2 times daily as needed Yes Historical Provider, MD       Social History     Tobacco Use    Smoking status: Former     Packs/day: 0.25     Types: Cigarettes     Quit date: 2022     Years since quittin.9    Smokeless tobacco: Current     Types: Snuff   Vaping Use    Vaping Use: Never used   Substance Use Topics    Alcohol use: Yes     Alcohol/week: 10.0 standard drinks     Types: 10 Cans of beer per week     Comment: 3-4 X WEEKLY    Drug use: Not Currently     Types: Marijuana Kylah Gouty)        Allergies   Allergen Reactions    Cefepime Itching and Swelling     Lip swelling. PHYSICAL EXAMINATION:  [ INSTRUCTIONS:  \"[x]\" Indicates a positive item  \"[]\" Indicates a negative item  -- DELETE ALL ITEMS NOT EXAMINED]  Vital Signs: (As obtained by patient/caregiver or practitioner observation)    Blood pressure-  Heart rate-    Respiratory rate- 14   Temperature- 98.6 Pulse oximetry-     Constitutional: [] Appears well-developed and well-nourished [x] No apparent distress      [] Abnormal-   Mental status  [x] Alert and awake  [x] Oriented to person/place/time []Able to follow commands      Eyes:  EOM    [x]  Normal  [] Abnormal-  Sclera  []  Normal  [] Abnormal -         Discharge []  None visible  [] Abnormal -    HENT:   [x] Normocephalic, atraumatic.   [] Abnormal   [x] Mouth/Throat: Mucous membranes are moist.     External Ears [x] Normal  [] Abnormal-     Neck: [x] No visualized mass     Pulmonary/Chest: [x] Respiratory effort normal.  [] No visualized signs of difficulty breathing or respiratory distress        [] Abnormal-      Musculoskeletal:   [] Normal gait with no signs of ataxia         [] Normal range of motion of neck        [] Abnormal-       Neurological:        [] No Facial Asymmetry (Cranial nerve 7 motor function) (limited exam to video visit)          [] No gaze palsy        [] Abnormal-         Skin: [] No significant exanthematous lesions or discoloration noted on facial skin         [x] Abnormal- red, irregular shaped macule on left upper outer thigh, no erythema noted on calf           Psychiatric:       [] Normal Affect [] No Hallucinations        [] Abnormal-     Other pertinent observable physical exam findings- no calf swelling or tenderness with patient palpating    ASSESSMENT/PLAN:  1. Recurrent cellulitis of lower extremity  -  uncontrolled, recurring. pt will reach out to the inflammatory skin specialist to schedule a follow up. He would consider a referral to the [facility] if Dr. Jose Alberto Bhakta feels this is appropriate  - clindamycin (CLEOCIN) 300 MG capsule; Take 1 capsule by mouth 4 times daily for 10 days  Dispense: 40 capsule; Refill: 0    Return if symptoms worsen or fail to improve. Maikel Jackson., was evaluated through a synchronous (real-time) audio-video encounter. The patient (or guardian if applicable) is aware that this is a billable service, which includes applicable co-pays. This Virtual Visit was conducted with patient's (and/or legal guardian's) consent. The visit was conducted pursuant to the emergency declaration under the [address] [place] [address], [address] [place] [address] authority and the [employer] and [employer] General Act. Patient identification was verified, and a caregiver was present when appropriate. The patient was located at Home: [address] [place] [address]. Provider was located at [facility] (Appt Dept): [address]  [address] [address],[address]. Total time spent on this encounter: Not billed by time    --LYDIA Vitale on 1/5/2023 at 3:10 PM    An electronic signature was used to authenticate this note.

## 2023-01-05 NOTE — LETTER
Jimena Rosa [address] [address]  Phone: [phone]  Fax: [phone]    LYDIA Yuen        January 5, 2023     Patient: Gladys Crenshaw. YOB: 1985   Date of Visit: 1/5/2023       To Whom It May Concern: It is my medical opinion that Anastacio Thomas may return to work on 01/09/2023. Please excuse absence from work on 01/04-01/06 due to infection. If you have any questions or concerns, please don't hesitate to call.     Sincerely,          LYDIA Yuen

## 2023-01-21 NOTE — TELEPHONE ENCOUNTER
Refill Request     CONFIRM preferrred pharmacy with the patient. If Mail Order Rx - Pend for 90 day refill. Last Seen: Last Seen Department: 1/5/2023  Last Seen by PCP: 1/5/2023    Last Written: 8/28/2022    If no future appointment scheduled, route STAFF MESSAGE with patient name to the [facility] for scheduling. Next Appointment:   Future Appointments   Date Time Provider Odin Rodarte   2/17/2023  9:30 AM LYDIA Jose  [place] - [facility]   3/30/2023  8:00 AM Namrata Bauer MD AND ORTHO [facility]       Message sent to [address] [place] [address] to schedule appt with patient?   NO      Requested Prescriptions     Pending Prescriptions Disp Refills    KLOR-CON M10 10 MEQ extended release tablet [Pharmacy Med Name: KLOR-CON M10 TABLET] 90 tablet 1     Sig: TAKE 1 TABLET BY MOUTH EVERY DAY

## 2023-01-22 RX ORDER — POTASSIUM CHLORIDE 750 MG/1
TABLET, EXTENDED RELEASE ORAL
Qty: 90 TABLET | Refills: 0 | Status: SHIPPED | OUTPATIENT
Start: 2023-01-22

## 2023-02-01 DIAGNOSIS — R60.0 LOCALIZED EDEMA: ICD-10-CM

## 2023-02-01 RX ORDER — HYDROCHLOROTHIAZIDE 25 MG/1
TABLET ORAL
Qty: 90 TABLET | Refills: 1 | Status: SHIPPED | OUTPATIENT
Start: 2023-02-01

## 2023-02-01 NOTE — TELEPHONE ENCOUNTER
Refill Request     CONFIRM preferrred pharmacy with the patient. If Mail Order Rx - Pend for 90 day refill. Last Seen: Last Seen Department: 1/5/2023  Last Seen by PCP: 1/5/2023    Last Written: 11/04/2022 90 tablet 1 refills     If no future appointment scheduled, route STAFF MESSAGE with patient name to the [facility] for scheduling. Next Appointment:   Future Appointments   Date Time Provider [facility]   2/17/2023  9:30 AM LYDIA Gonzalez  [place] - [facility]   3/30/2023  8:00 AM Ajit Merino PA-C AND OR[facility]       Message sent to [employer] to schedule appt with patient?   NO      Requested Prescriptions     Pending Prescriptions Disp Refills    hydroCHLOROthiazide (HYDRODIURIL) 25 MG tablet [Pharmacy Med Name: HYDROCHLOROTHIAZIDE 25 MG TAB] 90 tablet 1     Sig: TAKE 1 TABLET BY MOUTH EVERY DAY IN THE MORNING

## 2023-02-16 NOTE — PROGRESS NOTES
Ksenia Nicholas (:  1985) is a 40 y.o. male,Established patient, here for evaluation of the following chief complaint(s):  Hypertension         ASSESSMENT/PLAN:  1. Essential hypertension  -     Renal Function Panel; Future  -    Blood pressure is well controlled with lasix and lisinopril. Pt is having side effects of muscle cramping. We will recheck renal function and adjust potassium as needed. Follow up in 6 months  2. Mild intermittent asthma without complication       -   stable, using albuterol rarely. We discussed the recommendation for pneumovax due to asthma but the pt declined at this time. Continue with albuterol inhaler  3. Class 2 severe obesity due to excess calories with serious comorbidity and body mass index (BMI) of 37.0 to 37.9 in [facility])     -  Increase physical activity to 150 minutes per week and reduce portions at meal times to improve this number as well as his blood pressure and swelling. Return in about 6 months (around 2023) for HTN. Subjective   SUBJECTIVE/OBJECTIVE:  HPI  HTN:  Current medication: Lasix (potassium) and lisinopril  Side effects: muscle cramps  Home blood pressure readings: normal range  Reports: none  Denies:dizziness, shortness of breath, headache  Swelling in legs has gone down as well as the redness he has been experiencing as well  Due for renal panel  Wears compression socks      Asthma:  Current medication: albuterol  Side effects: none  Aggravated by changes in temperature and allergies  Not needing inhaler at night    Pneumovax: pt declined  Flu: pt decined    Review of Systems   Constitutional:  Negative for diaphoresis, fatigue and unexpected weight change. Respiratory:  Negative for shortness of breath and wheezing. Cardiovascular:  Positive for leg swelling. Negative for chest pain and palpitations. Gastrointestinal:  Negative for abdominal pain and blood in stool. Genitourinary:  Negative for hematuria.    Skin: Negative for color change. Neurological:  Negative for dizziness, light-headedness and headaches. Objective   Physical Exam  Vitals reviewed. Constitutional:       Appearance: Normal appearance. He is obese. Neck:      Thyroid: No thyromegaly. Cardiovascular:      Rate and Rhythm: Normal rate and regular rhythm. Heart sounds: Normal heart sounds. Pulmonary:      Effort: Pulmonary effort is normal.      Breath sounds: Normal breath sounds. Musculoskeletal:      Right lower leg: No edema. Left lower leg: No edema. Comments: Wearing compression stockings   Neurological:      General: No focal deficit present. Mental Status: He is alert and oriented to person, place, and time. An electronic signature was used to authenticate this note.     --LYDIA Alcazar

## 2023-02-17 ENCOUNTER — OFFICE VISIT (OUTPATIENT)
Dept: FAMILY MEDICINE CLINIC | Age: 38
End: 2023-02-17
Payer: COMMERCIAL

## 2023-02-17 VITALS
DIASTOLIC BLOOD PRESSURE: 70 MMHG | HEART RATE: 62 BPM | OXYGEN SATURATION: 97 % | BODY MASS INDEX: 37.75 KG/M2 | SYSTOLIC BLOOD PRESSURE: 110 MMHG | WEIGHT: 241 LBS

## 2023-02-17 DIAGNOSIS — E66.01 CLASS 2 SEVERE OBESITY DUE TO EXCESS CALORIES WITH SERIOUS COMORBIDITY AND BODY MASS INDEX (BMI) OF 37.0 TO 37.9 IN ADULT (HCC): ICD-10-CM

## 2023-02-17 DIAGNOSIS — I10 ESSENTIAL HYPERTENSION: ICD-10-CM

## 2023-02-17 DIAGNOSIS — J45.20 MILD INTERMITTENT ASTHMA WITHOUT COMPLICATION: ICD-10-CM

## 2023-02-17 DIAGNOSIS — I10 ESSENTIAL HYPERTENSION: Primary | ICD-10-CM

## 2023-02-17 PROBLEM — M16.12 OSTEOARTHRITIS OF LEFT HIP: Status: RESOLVED | Noted: 2022-03-28 | Resolved: 2023-02-17

## 2023-02-17 PROBLEM — M16.12 PRIMARY OSTEOARTHRITIS OF LEFT HIP: Status: RESOLVED | Noted: 2021-12-28 | Resolved: 2023-02-17

## 2023-02-17 PROBLEM — M25.552 HIP PAIN, LEFT: Status: RESOLVED | Noted: 2021-12-28 | Resolved: 2023-02-17

## 2023-02-17 PROBLEM — L60.8 DISCOLORATION OF NAIL: Status: RESOLVED | Noted: 2019-01-14 | Resolved: 2023-02-17

## 2023-02-17 PROBLEM — M25.859 FEMORAL ACETABULAR IMPINGEMENT: Status: RESOLVED | Noted: 2021-12-28 | Resolved: 2023-02-17

## 2023-02-17 LAB
ALBUMIN SERPL-MCNC: 4.1 G/DL (ref 3.4–5)
ANION GAP SERPL CALCULATED.3IONS-SCNC: 10 MMOL/L (ref 3–16)
BUN BLDV-MCNC: 12 MG/DL (ref 7–20)
CALCIUM SERPL-MCNC: 9.2 MG/DL (ref 8.3–10.6)
CHLORIDE BLD-SCNC: 102 MMOL/L (ref 99–110)
CO2: 23 MMOL/L (ref 21–32)
CREAT SERPL-MCNC: 0.8 MG/DL (ref 0.9–1.3)
GFR SERPL CREATININE-BSD FRML MDRD: >60 ML/MIN/{1.73_M2}
GLUCOSE BLD-MCNC: 102 MG/DL (ref 70–99)
PHOSPHORUS: 2.8 MG/DL (ref 2.5–4.9)
POTASSIUM SERPL-SCNC: 4.9 MMOL/L (ref 3.5–5.1)
SODIUM BLD-SCNC: 135 MMOL/L (ref 136–145)

## 2023-02-17 PROCEDURE — 3074F SYST BP LT 130 MM HG: CPT | Performed by: PHYSICIAN ASSISTANT

## 2023-02-17 PROCEDURE — 3078F DIAST BP <80 MM HG: CPT | Performed by: PHYSICIAN ASSISTANT

## 2023-02-17 PROCEDURE — 99214 OFFICE O/P EST MOD 30 MIN: CPT | Performed by: PHYSICIAN ASSISTANT

## 2023-02-17 ASSESSMENT — PATIENT HEALTH QUESTIONNAIRE - PHQ9
6. FEELING BAD ABOUT YOURSELF - OR THAT YOU ARE A FAILURE OR HAVE LET YOURSELF OR YOUR FAMILY DOWN: 0
1. LITTLE INTEREST OR PLEASURE IN DOING THINGS: 0
3. TROUBLE FALLING OR STAYING ASLEEP: 0
2. FEELING DOWN, DEPRESSED OR HOPELESS: 0
5. POOR APPETITE OR OVEREATING: 0
SUM OF ALL RESPONSES TO PHQ QUESTIONS 1-9: 0
10. IF YOU CHECKED OFF ANY PROBLEMS, HOW DIFFICULT HAVE THESE PROBLEMS MADE IT FOR YOU TO DO YOUR WORK, TAKE CARE OF THINGS AT HOME, OR GET ALONG WITH OTHER PEOPLE: 0
SUM OF ALL RESPONSES TO PHQ9 QUESTIONS 1 & 2: 0
DEPRESSION UNABLE TO ASSESS: FUNCTIONAL CAPACITY MOTIVATION LIMITS ACCURACY
4. FEELING TIRED OR HAVING LITTLE ENERGY: 0
SUM OF ALL RESPONSES TO PHQ QUESTIONS 1-9: 0
9. THOUGHTS THAT YOU WOULD BE BETTER OFF DEAD, OR OF HURTING YOURSELF: 0
SUM OF ALL RESPONSES TO PHQ QUESTIONS 1-9: 0
SUM OF ALL RESPONSES TO PHQ QUESTIONS 1-9: 0
8. MOVING OR SPEAKING SO SLOWLY THAT OTHER PEOPLE COULD HAVE NOTICED. OR THE OPPOSITE, BEING SO FIGETY OR RESTLESS THAT YOU HAVE BEEN MOVING AROUND A LOT MORE THAN USUAL: 0
7. TROUBLE CONCENTRATING ON THINGS, SUCH AS READING THE NEWSPAPER OR WATCHING TELEVISION: 0

## 2023-02-17 ASSESSMENT — ENCOUNTER SYMPTOMS
SHORTNESS OF BREATH: 0
BLOOD IN STOOL: 0
WHEEZING: 0
COLOR CHANGE: 0
ABDOMINAL PAIN: 0

## 2023-03-17 DIAGNOSIS — J45.20 MILD INTERMITTENT ASTHMA WITHOUT COMPLICATION: Primary | ICD-10-CM

## 2023-03-17 RX ORDER — ALBUTEROL SULFATE 90 UG/1
2 AEROSOL, METERED RESPIRATORY (INHALATION) 4 TIMES DAILY PRN
Qty: 18 EACH | Refills: 5 | Status: SHIPPED | OUTPATIENT
Start: 2023-03-17

## 2023-03-17 NOTE — TELEPHONE ENCOUNTER
Refill Request     CONFIRM preferred pharmacy with the patient. If Mail Order Rx - Pend for 90 day refill. Last Seen: Last Seen Department: 2/17/2023  Last Seen by PCP: 2/17/2023    Last Written: 9/7/2022    If no future appointment scheduled, route STAFF MESSAGE with patient name to the [facility] for scheduling. Next Appointment:   Future Appointments   Date Time Provider Odin Rodarte   3/30/2023  8:00 AM Freddy Lomeli PA-C [facility]       Message sent to [address] [place] [address] to schedule appt with patient? NO      Requested Prescriptions     Pending Prescriptions Disp Refills    albuterol sulfate HFA (PROVENTIL;VENTOLIN;PROAIR) 108 (90 Base) MCG/ACT inhaler [Pharmacy Med Name: ALBUTEROL HFA (VENTOLIN) INH] 18 each 5     Sig: INHALE 2 PUFFS INTO THE LUNGS 4 TIMES DAILY AS NEEDED FOR WHEEZING.

## 2023-03-30 ENCOUNTER — OFFICE VISIT (OUTPATIENT)
Dept: ORTHOPEDIC SURGERY | Age: 38
End: 2023-03-30

## 2023-03-30 VITALS — WEIGHT: 241 LBS | HEIGHT: 67 IN | BODY MASS INDEX: 37.83 KG/M2

## 2023-03-30 DIAGNOSIS — Z96.642 STATUS POST TOTAL HIP REPLACEMENT, LEFT: Primary | ICD-10-CM

## 2023-03-30 NOTE — PROGRESS NOTES
Encounter   Procedures    XR HIP LEFT (2-3 VIEWS)     Standing Status:   Future     Number of Occurrences:   1     Standing Expiration Date:   3/27/2024       Treatment Plan: At this time the patient is doing very well at 1 year postop. Would recommend routine follow up every 2 years unless there is a problem. It is recommended they continue with oral anti-biotics for dental prophylaxis and maintaining healthy body weight. I discussed with Tata Chery. that his history, symptoms, signs, and imaging are most consistent with previous LIZETT replacement    We reviewed the natural history of these conditions and treatment options ranging from conservative measures (rest, icing, activity modification, physical therapy, pain meds, cortisone injection) to surgical options. In terms of treatment, I recommended continuing with rest, icing, avoidance of painful activities, NSAIDs or pain meds as tolerated, and physical therapy. If these are not effective, cortisone injection can be considered. We discussed surgical options as well, should conservative measures fail.

## 2023-05-01 DIAGNOSIS — I10 ESSENTIAL HYPERTENSION: ICD-10-CM

## 2023-05-01 RX ORDER — LISINOPRIL 10 MG/1
TABLET ORAL
Qty: 90 TABLET | Refills: 1 | Status: SHIPPED | OUTPATIENT
Start: 2023-05-01

## 2023-05-01 NOTE — TELEPHONE ENCOUNTER
.Refill Request     CONFIRM preferred pharmacy with the patient. If Mail Order Rx - Pend for 90 day refill. Last Seen: Last Seen Department: 2/17/2023  Last Seen by PCP: 2/17/2023    Last Written: 11-3-22 90 with 1     If no future appointment scheduled:  Review the last OV with PCP and review information for follow-up visit,  Route STAFF MESSAGE with patient name to the [facility] for scheduling with the following information:            -  Timing of next visit           -  Visit type ie Physical, OV, etc           -  Diagnoses/Reason ie. COPD, HTN - Do not use MEDICATION, Follow-up or CHECK UP - Give reason for visit      Next Appointment:   No future appointments. Message sent to [address] [place] [address] to schedule appt with patient?   YES      Requested Prescriptions     Pending Prescriptions Disp Refills    lisinopril (PRINIVIL;ZESTRIL) 10 MG tablet [Pharmacy Med Name: LISINOPRIL 10 MG TABLET] 90 tablet 1     Sig: TAKE 1 TABLET BY MOUTH EVERY DAY

## 2023-05-03 PROBLEM — L98.2 NEUTROPHILIC DERMATOSIS: Status: ACTIVE | Noted: 2023-05-03

## 2023-05-03 PROBLEM — L50.9 URTICARIAL DERMATITIS: Status: RESOLVED | Noted: 2023-05-03 | Resolved: 2023-05-03

## 2023-05-03 PROBLEM — L50.9 URTICARIAL DERMATITIS: Status: ACTIVE | Noted: 2023-05-03

## 2023-05-15 ENCOUNTER — PATIENT MESSAGE (OUTPATIENT)
Dept: FAMILY MEDICINE CLINIC | Age: 38
End: 2023-05-15

## 2023-05-15 RX ORDER — PREDNISONE 50 MG/1
50 TABLET ORAL DAILY
Qty: 5 TABLET | Refills: 0 | Status: SHIPPED | OUTPATIENT
Start: 2023-05-15 | End: 2023-05-20

## 2023-05-15 NOTE — TELEPHONE ENCOUNTER
From: Martin Alberts. To: Cindy Pérez  Sent: 5/15/2023 3:30 PM EDT  Subject: Flare Up     Hi Arielle Dinh hasnt been able to make it to get his blood work done yet, he worked 6 days last week and just didnt have time. But he is beginning to have another flare up, he can feel it coming on. Hopefully he will feel well enough to get it done this week. We were researching about the neutrophilic dermatosis, and it says that it is treatable with prednisone? We were wondering if he could try this to at least see if it helps. Hes always done antibiotics in the past, but it has never done anything for him. 800mg ibuprofen helps, and thats about it. Wanted to get your thoughts to see what you think?    Thanks!!

## 2023-05-16 DIAGNOSIS — M25.50 ARTHRALGIA, UNSPECIFIED JOINT: ICD-10-CM

## 2023-05-16 DIAGNOSIS — L50.9 URTICARIAL DERMATITIS: ICD-10-CM

## 2023-05-16 DIAGNOSIS — L98.2 NEUTROPHILIC DERMATOSIS: ICD-10-CM

## 2023-05-16 LAB
BASOPHILS # BLD: 0.1 K/UL (ref 0–0.2)
BASOPHILS NFR BLD: 0.5 %
CRP SERPL-MCNC: 63.7 MG/L (ref 0–5.1)
DEPRECATED RDW RBC AUTO: 13.6 % (ref 12.4–15.4)
EOSINOPHIL # BLD: 0 K/UL (ref 0–0.6)
EOSINOPHIL NFR BLD: 0.1 %
ERYTHROCYTE [SEDIMENTATION RATE] IN BLOOD BY WESTERGREN METHOD: 29 MM/HR (ref 0–15)
FERRITIN SERPL IA-MCNC: 212.3 NG/ML (ref 30–400)
HCT VFR BLD AUTO: 46.3 % (ref 40.5–52.5)
HGB BLD-MCNC: 15.7 G/DL (ref 13.5–17.5)
LYMPHOCYTES # BLD: 1.3 K/UL (ref 1–5.1)
LYMPHOCYTES NFR BLD: 9.1 %
MCH RBC QN AUTO: 31.7 PG (ref 26–34)
MCHC RBC AUTO-ENTMCNC: 34 G/DL (ref 31–36)
MCV RBC AUTO: 93.2 FL (ref 80–100)
MONOCYTES # BLD: 0.6 K/UL (ref 0–1.3)
MONOCYTES NFR BLD: 4.1 %
NEUTROPHILS # BLD: 12 K/UL (ref 1.7–7.7)
NEUTROPHILS NFR BLD: 86.2 %
PLATELET # BLD AUTO: 221 K/UL (ref 135–450)
PMV BLD AUTO: 9.4 FL (ref 5–10.5)
RBC # BLD AUTO: 4.96 M/UL (ref 4.2–5.9)
RHEUMATOID FACT SER IA-ACNC: 12 IU/ML
WBC # BLD AUTO: 13.9 K/UL (ref 4–11)

## 2023-05-16 RX ORDER — POTASSIUM CHLORIDE 750 MG/1
TABLET, EXTENDED RELEASE ORAL
Qty: 90 TABLET | Refills: 0 | Status: SHIPPED | OUTPATIENT
Start: 2023-05-16

## 2023-05-16 NOTE — TELEPHONE ENCOUNTER
Refill Request     CONFIRM preferred pharmacy with the patient. If Mail Order Rx - Pend for 90 day refill. Last Seen: Last Seen Department: 2/17/2023  Last Seen by PCP: Visit date not found    Last Written: 01/22/2023 90 tablet 0 refills     If no future appointment scheduled:  Review the last OV with PCP and review information for follow-up visit,  Route STAFF MESSAGE with patient name to the [facility] for scheduling with the following information:            -  Timing of next visit           -  Visit type ie Physical, OV, etc           -  Diagnoses/Reason ie. COPD, HTN - Do not use MEDICATION, Follow-up or CHECK UP - Give reason for visit      Next Appointment:   No future appointments. Message sent to [address] [place] [address] to schedule appt with patient? YES  Return in about 6 months (around 8/17/2023) for HTN.     Requested Prescriptions     Pending Prescriptions Disp Refills    KLOR-CON M10 10 MEQ extended release tablet [Pharmacy Med Name: KLOR-CON M10 TABLET] 90 tablet 0     Sig: TAKE 1 TABLET BY MOUTH EVERY DAY

## 2023-05-17 LAB
ALBUMIN SERPL ELPH-MCNC: 3.4 G/DL (ref 3.1–4.9)
ALPHA1 GLOB SERPL ELPH-MCNC: 0.4 G/DL (ref 0.2–0.4)
ALPHA2 GLOB SERPL ELPH-MCNC: 0.9 G/DL (ref 0.4–1.1)
ANA SER QL IA: NEGATIVE
B-GLOBULIN SERPL ELPH-MCNC: 1.2 G/DL (ref 0.9–1.6)
GAMMA GLOB SERPL ELPH-MCNC: 1.1 G/DL (ref 0.6–1.8)
PROT SERPL-MCNC: 7 G/DL (ref 6.4–8.2)
SPE/IFE INTERPRETATION: NORMAL

## 2023-05-18 DIAGNOSIS — R21 SKIN RASH: ICD-10-CM

## 2023-05-18 DIAGNOSIS — R79.82 ELEVATED C-REACTIVE PROTEIN (CRP): Primary | ICD-10-CM

## 2023-05-18 LAB — IL6 SERPL-MCNC: <2 PG/ML

## 2023-05-26 RX ORDER — FUROSEMIDE 20 MG/1
TABLET ORAL
Qty: 90 TABLET | Refills: 0 | Status: SHIPPED | OUTPATIENT
Start: 2023-05-26

## 2023-07-21 NOTE — FLOWSHEET NOTE
[address] [place] [address] and Sports Rehabilitation, [address]   Phone [phone]  Fax [phone]    Outpatient Physical Therapy     [x] Daily Treatment Note   [] Progress Note   [] Discharge Note    Date:  4/27/2022    Patient Name:  Luis Toro \"Ravindra\"       YOB: 1985    Medical Diagnosis: S/P L THR (L33.581)   Treatment Diagnosis:   L hip pain (M25.552), L hip effusion (M25.452), LE weakness, decreased flexibility, decreased ROM, decreased functional status, impaired balance     Onset Date: 3/28/22  Referral Date: 4/19/22  Referring Physician:  Prashanth Beverly PA-C/ Dr. Amari Carrizales     Visits Allowed/Insurance/Certification Information:  Luan[employer] [place] 150- 48 visits, no auth      Restrictions/Precautions:  L hip anterior THR precautions    Latex Allergy:  [x]NO      []YES    Plan of care sent to provider:   []NA   []Faxed   [x]Co-signature       Plan of care signed:    [x]NA   []Yes   []No      Progress report will be due (10 Rx or 30 days whichever is less): 9/87/51     Recertification will be due (POC Duration  / 90 days whichever is less): 7/27/22    Visit# / total visits:     Visit # Insurance Allowable Auth Required   In Person 1/16 50 []  Yes     [x]  No    [facility] 0  []  Yes     []  No    Total 1/16       Plan for Next Session:  Add nustep, heel raises, standing hip abd, standing hip ext, forward step downs, SLS, leg press, knee ext, prone HS curls, gait training without AD, assess effectiveness of heel lift    Subjective:  See eval     Pain level:   AT EVAL: Patient describes pain to be intermittent lateral aspect L hip and thigh. Having some popping in L knee. Patient reports pain is  1/10 pain at present and  5/10 pain at its worst.  Worsened by:   Incision hurts with prolonged sitting, some discomfort with transitional movements        Objective:         4 weeks p/o on 4/25/22  Exercises:    Exercises in bold performed in department today. Items not bolded are carried forward from prior visits for continuity of the record. Exercise/Equipment Resistance/Repetitions Issued for HEP     nustep nv      TA with ball squeeze 6 sec/ x10    Bridge with ball squeeze   x10     sidelying hip abd  x10     sidelying clam shells  x10      Prone hip ext x10  (small range)      Seated HS stretch 30 sec/ x3                                                                                      Therapeutic Exercise/Home Exercise Program:   x30 min. Patient educated on eval findings, plan of care, and goals. Instructed in HEP with handout given. Issued heel lift for R LE. Group Therapy:    0 minutes    Therapeutic Activity:  0 minutes     Gait: 0 minutes    Neuromuscular Re-Education:  0 minutes      Canalith Repositioning Procedure:  0 minutes    Manual Therapy:  0 minutes    Modalities: 0 minutes   [] GAME READY (VASO)- for significant edema, swelling, pain control. Functional Outcome Measure:   []NA  Measure Used:  FOTO  Date Assessed:4/27/22  FOTO initial Score: 53  Predicted d/c FOTO score:  78    Assessment/Treatment/Activity Tolerance:    Patients response to treatment:   [x]Patient tolerated treatment well with no adverse reactions noted    []Patient limited by fatigue   []Patient limited by pain    []Patient limited by other medical complications   []Other:     Goals:   Progress towards goals:  Goals established on 4/27/22  GOALS:  Short Term Goals: 2 weeks  1. Independent in HEP and progression per patient tolerance, in order to prevent re-injury. []? Progressing: []? Met: []? Not Met: []? Adjusted       2. Patient will have a decrease in pain to facilitate improvement in movement, function, and ADLs as indicated by Functional Deficits. []? Progressing: []? Met: []? Not Met: []? Adjusted          Long Term Goals: 8 weeks  1. Achieve d/c FOTO score of 78 or greater to assist with reaching prior level of function.    []? Progressing: []? Met: []? Not Met: []? Adjusted      2. Patient will demonstrate increased AROM L hip/knee/HS equal to R LE to allow for proper joint functioning as indicated by patients Functional Deficits. []? Progressing: []? Met: []? Not Met: []? Adjusted       3. Patient will demonstrate an increase in strength to 5/5 throughout L LE to allow for proper functional mobility as indicated by patients Functional Deficits. []? Progressing: []? Met: []? Not Met: []? Adjusted       4. Patient will return to all transfers, work activities, and functional activities without increased symptoms or restriction. []? Progressing: []? Met: []? Not Met: []? Adjusted       5. Patient will have 0-2/10 pain with ADL's.  []? Progressing: []? Met: []? Not Met: []? Adjusted       6. Patient stated goal: walk community distances on varied surfaces without AD, and up/down stairs with alt pattern without rails independently. []? Progressing: []? Met: []? Not Met: []? Adjusted    Prognosis: [x]Good   []Fair   []Poor    Patient Requires Follow-up:  [x]Yes  []No    Plan: [x]Plan of care initiated     []Continue per plan of care    [] Alter current plan (see comments)    []Hold pending MD visit []Discharge    Charges:  Timed Code Treatment Minutes: 30   Total Treatment Minutes:  60   BWC time for each procedure?:  TE TIME:  NMR TIME:  MANUAL TIME:  UNTIMED MINUTES:       [] EVAL (LOW) 81070 (typically 20 minutes face-to-face)  [x] EVAL (MOD) 49491 (typically 30 minutes face-to-face)  [] EVAL (HIGH) 41775 (typically 45 minutes face-to-face)  [] RE-EVAL     [x] KR(78849) x2    [] IONTO  [] NMR (89531) x     [] VASO  [] Manual (31102) x     [] Other:  [] TA x      [] Mech Traction (38935)  [] ES(attended) (61633)     [] ES (un) (41229):     Medicare Cap total YTD:   [x]N/A    Electronically signed by:  Melissa Blancas, PT, MPT, OMT-C [id number]      Note: If patient does not return for scheduled/ recommended follow up visits, this note will serve as a discharge from care along with most recent update on progress. Destruction After The Procedure: No

## 2023-08-20 NOTE — TELEPHONE ENCOUNTER
Refill Request     CONFIRM preferred pharmacy with the patient. If Mail Order Rx - Pend for 90 day refill. Last Seen: Last Seen Department: 2/17/2023  Last Seen by PCP: 2/17/2023    Last Written: 5/16/23 0 refill    If no future appointment scheduled:  Review the last OV with PCP and review information for follow-up visit,  Route STAFF MESSAGE with patient name to the [facility] for scheduling with the following information:            -  Timing of next visit           -  Visit type ie Physical, OV, etc           -  Diagnoses/Reason ie. COPD, HTN - Do not use MEDICATION, Follow-up or CHECK UP - Give reason for visit      Next Appointment:   Future Appointments   Date Time Provider [address]   9/1/2023 11:30 AM LYDIA Christopher  Chaim VetDC JULIO CESAR       Message sent to [address] [place] [address] to schedule appt with patient?   NO      Requested Prescriptions     Pending Prescriptions Disp Refills    KLOR-CON M10 10 MEQ extended release tablet [Pharmacy Med Name: KLOR-CON M10 TABLET] 90 tablet 0     Sig: TAKE 1 TABLET BY MOUTH EVERY DAY

## 2023-08-21 RX ORDER — POTASSIUM CHLORIDE 750 MG/1
TABLET, EXTENDED RELEASE ORAL
Qty: 90 TABLET | Refills: 0 | Status: SHIPPED | OUTPATIENT
Start: 2023-08-21

## 2023-09-25 ENCOUNTER — OFFICE VISIT (OUTPATIENT)
Dept: FAMILY MEDICINE CLINIC | Age: 38
End: 2023-09-25
Payer: COMMERCIAL

## 2023-09-25 VITALS
WEIGHT: 235 LBS | DIASTOLIC BLOOD PRESSURE: 70 MMHG | BODY MASS INDEX: 36.81 KG/M2 | HEART RATE: 103 BPM | OXYGEN SATURATION: 99 % | SYSTOLIC BLOOD PRESSURE: 140 MMHG

## 2023-09-25 DIAGNOSIS — L98.2 NEUTROPHILIC DERMATOSIS: ICD-10-CM

## 2023-09-25 DIAGNOSIS — J45.20 MILD INTERMITTENT ASTHMA WITHOUT COMPLICATION: ICD-10-CM

## 2023-09-25 DIAGNOSIS — I10 ESSENTIAL HYPERTENSION: Primary | ICD-10-CM

## 2023-09-25 PROCEDURE — 99214 OFFICE O/P EST MOD 30 MIN: CPT | Performed by: PHYSICIAN ASSISTANT

## 2023-09-25 PROCEDURE — 3077F SYST BP >= 140 MM HG: CPT | Performed by: PHYSICIAN ASSISTANT

## 2023-09-25 PROCEDURE — 3078F DIAST BP <80 MM HG: CPT | Performed by: PHYSICIAN ASSISTANT

## 2023-09-25 RX ORDER — PREDNISONE 20 MG/1
40 TABLET ORAL DAILY
Qty: 10 TABLET | Refills: 0 | Status: SHIPPED | OUTPATIENT
Start: 2023-09-25 | End: 2023-09-30

## 2023-09-25 SDOH — ECONOMIC STABILITY: INCOME INSECURITY: HOW HARD IS IT FOR YOU TO PAY FOR THE VERY BASICS LIKE FOOD, HOUSING, MEDICAL CARE, AND HEATING?: NOT HARD AT ALL

## 2023-09-25 SDOH — ECONOMIC STABILITY: FOOD INSECURITY: WITHIN THE PAST 12 MONTHS, THE FOOD YOU BOUGHT JUST DIDN'T LAST AND YOU DIDN'T HAVE MONEY TO GET MORE.: NEVER TRUE

## 2023-09-25 SDOH — ECONOMIC STABILITY: FOOD INSECURITY: WITHIN THE PAST 12 MONTHS, YOU WORRIED THAT YOUR FOOD WOULD RUN OUT BEFORE YOU GOT MONEY TO BUY MORE.: NEVER TRUE

## 2023-09-25 ASSESSMENT — ENCOUNTER SYMPTOMS
COLOR CHANGE: 0
SHORTNESS OF BREATH: 0

## 2023-09-25 NOTE — PROGRESS NOTES
Pinky Rubi. (:  1985) is a 41 y.o. male,Established patient, here for evaluation of the following chief complaint(s):  Hypertension (Follow up )         ASSESSMENT/PLAN:  1. Essential hypertension       -  elevated in office, I wanted to increase lisinopril but the pt would like to take his blood pressure at home for a week and report back with readings. Will call in him 7 days if I have not heard from him by then. Otherwise, follow up in 6 months, fasting lab work at that time  2. Neutrophilic dermatosis  -     predniSONE (DELTASONE) 20 MG tablet; Take 2 tablets by mouth daily for 5 days, Disp-10 tablet, R-0Normal  -    will start prednisone burst which has worked well in the past. Will follow up with specialist prn  3. Mild intermittent asthma without complication       -  stable, continue with albuterol inhaler. Follow up in 6 months or sooner if needed    Return in about 6 months (around 3/25/2024) for HTN. Subjective   SUBJECTIVE/OBJECTIVE:  HPI  HTN:  Current medication: lisinopril  Side effects: none  Home blood pressure readings: has not been checking  Reports: mild swelling in hands and feet  Denies: chest pressure, headaches, dizziness or dyspnea on exertion  Exercise: doing weight lifting 3-4 times per week  Diet: has been cutting out fast foods and low sweets    Neutrophilic Dermatosis  Pt has not had any recent flares  He is having some joint swelling and pain and is going on a trip soon  Pt is interested in taking another Rx of prednisone    Review of Systems   Constitutional:  Negative for fatigue and unexpected weight change. Respiratory:  Negative for shortness of breath. Cardiovascular:  Negative for chest pain, palpitations and leg swelling. Musculoskeletal:  Positive for joint swelling. Skin:  Negative for color change and rash. Neurological:  Negative for dizziness, light-headedness and headaches. Objective   Physical Exam  Vitals reviewed.

## 2023-10-02 ENCOUNTER — TELEPHONE (OUTPATIENT)
Dept: FAMILY MEDICINE CLINIC | Age: 38
End: 2023-10-02

## 2023-10-02 NOTE — TELEPHONE ENCOUNTER
Please call the patient and see how his blood pressure has been running at home since his appointment.  Thank you

## 2023-11-01 DIAGNOSIS — I10 ESSENTIAL HYPERTENSION: ICD-10-CM

## 2023-11-01 RX ORDER — LISINOPRIL 10 MG/1
TABLET ORAL
Qty: 90 TABLET | Refills: 1 | Status: SHIPPED | OUTPATIENT
Start: 2023-11-01

## 2023-11-01 NOTE — TELEPHONE ENCOUNTER
Refill Request     CONFIRM preferred pharmacy with the patient. If Mail Order Rx - Pend for 90 day refill. Last Seen: Last Seen Department: 9/25/2023  Last Seen by PCP: 9/25/2023    Last Written: 5/1/2023    If no future appointment scheduled:  Review the last OV with PCP and review information for follow-up visit,  Route STAFF MESSAGE with patient name to the [facility] for scheduling with the following information:            -  Timing of next visit           -  Visit type ie Physical, OV, etc           -  Diagnoses/Reason ie. COPD, HTN - Do not use MEDICATION, Follow-up or CHECK UP - Give reason for visit      Next Appointment:   Future Appointments   Date Time Provider [address]   3/25/2024 10:00 AM LYDIA Hearn  Chaim - JULIO CESAR       Message sent to [address] [place] [address] to schedule appt with patient?   NO      Requested Prescriptions     Pending Prescriptions Disp Refills    lisinopril (PRINIVIL;ZESTRIL) 10 MG tablet [Pharmacy Med Name: LISINOPRIL 10 MG TABLET] 90 tablet 1     Sig: TAKE 1 TABLET BY MOUTH EVERY DAY   ,

## 2023-12-12 ENCOUNTER — TELEMEDICINE (OUTPATIENT)
Dept: PRIMARY CARE CLINIC | Age: 38
End: 2023-12-12
Payer: COMMERCIAL

## 2023-12-12 DIAGNOSIS — J06.9 VIRAL URI WITH COUGH: Primary | ICD-10-CM

## 2023-12-12 PROCEDURE — 99213 OFFICE O/P EST LOW 20 MIN: CPT | Performed by: NURSE PRACTITIONER

## 2023-12-12 RX ORDER — CHLORPHENIRAMINE MALEATE 4 MG/1
4 TABLET ORAL EVERY 6 HOURS PRN
Qty: 28 TABLET | Refills: 0 | Status: SHIPPED | OUTPATIENT
Start: 2023-12-12

## 2023-12-12 RX ORDER — BENZONATATE 200 MG/1
200 CAPSULE ORAL 3 TIMES DAILY PRN
Qty: 30 CAPSULE | Refills: 0 | Status: SHIPPED | OUTPATIENT
Start: 2023-12-12 | End: 2023-12-22

## 2023-12-12 RX ORDER — SOD CHLOR,BICARB/SQUEEZ BOTTLE
1 PACKET, WITH RINSE DEVICE NASAL 2 TIMES DAILY PRN
Qty: 1 EACH | Refills: 0 | Status: SHIPPED | OUTPATIENT
Start: 2023-12-12 | End: 2024-01-11

## 2023-12-12 ASSESSMENT — ENCOUNTER SYMPTOMS
SORE THROAT: 0
SINUS PRESSURE: 1
SHORTNESS OF BREATH: 0
VOICE CHANGE: 1
HOARSE VOICE: 0
COUGH: 1
WHEEZING: 0
SINUS PAIN: 0
CHEST TIGHTNESS: 0

## 2023-12-12 NOTE — PROGRESS NOTES
Cassy Beyer (:  1985) is a Established patient, here for evaluation of the following:    Sinusitis (Pt c/o symptoms x1-2 weeks, sinus/face pressure, yellow green mucus, nasal congestion/drainage, slight cough from drainage down back of throat, denies fever/chills, body aches)       Assessment & Plan:  Below is the assessment and plan developed based on review of pertinent history, physical exam, labs, studies, and medications. 1. Viral URI with cough  -     Hypertonic Nasal Wash (SINUS RINSE BOTTLE KIT) PACK; 1 packet by Nasal route 2 times daily as needed (congestion), Disp-1 each, R-0Normal  -     chlorpheniramine (CHLOR-TRIMETON) 4 MG tablet; Take 1 tablet by mouth every 6 hours as needed for Allergies or Rhinitis, Disp-28 tablet, R-0Normal  -     benzonatate (TESSALON) 200 MG capsule; Take 1 capsule by mouth 3 times daily as needed for Cough, Disp-30 capsule, R-0Normal  Stop nasal decongestant spray. Resume nasonex. Drink fluids. Use salien sinus irrigation. Patient advised to contact the Virtualist if he has worsening symptoms including pain, fever, dental pain, or worsening mucus production. Patient verbalized understanding. Return if symptoms worsen or fail to improve. Subjective: symptoms started in the past 1-2 weeks; Sinusitis  This is a new problem. The current episode started 1 to 4 weeks ago. The problem has been gradually worsening since onset. There has been no fever. Associated symptoms include congestion, coughing, ear pain (right ear feels plugged), sinus pressure (under the bridge of his nose) and sneezing. Pertinent negatives include no chills, diaphoresis, headaches, hoarse voice, shortness of breath or sore throat. Past treatments include nasal decongestants (zyrtec and nasonex didn't work). The treatment provided mild relief. No facial pain, no foul taste. Symptoms are worse in the AM and when lying down.  Improves throughout the day while up and working after

## 2023-12-27 ENCOUNTER — OFFICE VISIT (OUTPATIENT)
Dept: FAMILY MEDICINE CLINIC | Age: 38
End: 2023-12-27
Payer: COMMERCIAL

## 2023-12-27 VITALS
OXYGEN SATURATION: 98 % | SYSTOLIC BLOOD PRESSURE: 126 MMHG | BODY MASS INDEX: 33.67 KG/M2 | HEART RATE: 94 BPM | WEIGHT: 215 LBS | DIASTOLIC BLOOD PRESSURE: 70 MMHG

## 2023-12-27 DIAGNOSIS — L98.2 NEUTROPHILIC DERMATOSIS: Primary | ICD-10-CM

## 2023-12-27 PROCEDURE — 99213 OFFICE O/P EST LOW 20 MIN: CPT | Performed by: PHYSICIAN ASSISTANT

## 2023-12-27 PROCEDURE — 3078F DIAST BP <80 MM HG: CPT | Performed by: PHYSICIAN ASSISTANT

## 2023-12-27 PROCEDURE — 3074F SYST BP LT 130 MM HG: CPT | Performed by: PHYSICIAN ASSISTANT

## 2023-12-27 RX ORDER — PREDNISONE 20 MG/1
40 TABLET ORAL DAILY
Qty: 10 TABLET | Refills: 0 | Status: SHIPPED | OUTPATIENT
Start: 2023-12-27 | End: 2024-01-01

## 2023-12-27 NOTE — PROGRESS NOTES
Lisa Benson (:  1985) is a 41 y.o. male,Established patient, here for evaluation of the following chief complaint(s):  Leg Pain (Right lower leg, Redness and swelling x's 3 days )         ASSESSMENT/PLAN:  1. Neutrophilic dermatosis  -     predniSONE (DELTASONE) 20 MG tablet; Take 2 tablets by mouth daily for 5 days, Disp-10 tablet, R-0Normal  -    pt to call in 2 days, if no improvement we will add an antibiotic      Return if symptoms worsen or fail to improve. Subjective   SUBJECTIVE/OBJECTIVE:  HPI  Pt is here for flare up of Neutrophilic dermatitis  Timing: 3 days  Location: right lower leg  Associated symptoms: swelling in lymph nodes of the groin, low grade fever, fatigue, sweating  Tx: none    Review of Systems   Constitutional:  Positive for fatigue and fever. Cardiovascular:  Positive for leg swelling. Skin:  Positive for color change. Negative for wound. Neurological:  Negative for dizziness. Objective   Physical Exam  Vitals reviewed. Constitutional:       Appearance: Normal appearance. Skin:     General: Skin is warm. Findings: Erythema present. Neurological:      General: No focal deficit present. Mental Status: He is alert and oriented to person, place, and time. Cranial Nerves: No cranial nerve deficit. An electronic signature was used to authenticate this note.     --LYDIA Caruso

## 2024-03-07 ENCOUNTER — PREP FOR PROCEDURE (OUTPATIENT)
Dept: ORTHOPEDIC SURGERY | Age: 39
End: 2024-03-07

## 2024-03-07 ENCOUNTER — OFFICE VISIT (OUTPATIENT)
Dept: ORTHOPEDIC SURGERY | Age: 39
End: 2024-03-07
Payer: COMMERCIAL

## 2024-03-07 VITALS — HEIGHT: 67 IN | WEIGHT: 209 LBS | BODY MASS INDEX: 32.8 KG/M2

## 2024-03-07 DIAGNOSIS — M91.11 LEGG-PERTHES DISEASE, RIGHT: ICD-10-CM

## 2024-03-07 DIAGNOSIS — M16.11 PRIMARY OSTEOARTHRITIS OF RIGHT HIP: Primary | ICD-10-CM

## 2024-03-07 DIAGNOSIS — M91.11 PERTHES DISEASE, RIGHT: ICD-10-CM

## 2024-03-07 DIAGNOSIS — M16.31 OSTEOARTHRITIS RESULTING FROM RIGHT HIP DYSPLASIA: ICD-10-CM

## 2024-03-07 DIAGNOSIS — Z01.818 PRE-OP TESTING: ICD-10-CM

## 2024-03-07 DIAGNOSIS — M25.551 PAIN IN RIGHT HIP: Primary | ICD-10-CM

## 2024-03-07 DIAGNOSIS — M16.11 PRIMARY OSTEOARTHRITIS OF RIGHT HIP: ICD-10-CM

## 2024-03-07 PROCEDURE — 99215 OFFICE O/P EST HI 40 MIN: CPT | Performed by: ORTHOPAEDIC SURGERY

## 2024-03-07 NOTE — PROGRESS NOTES
Dr Michael Macario      Date /Time 3/7/2024       10:59 AM EST  Name Noam Arita Jr.             1985   [facility]  MRN [id number]                Chief Complaint   Patient presents with    Hip Pain     CH R HIP (last visit 1-20-22) L LIZETT 3-8-22)        History of Present Illness    Noam Arita Jr. is a 38 y.o. male who presents with  bilateral hip pain.    Sent in consultation by Karlee Diaz PA,.      Injury Mechanism:  none.  Worker's Comp. & legal issues:   none.  Previous Treatments: Ice, Heat and NSAIDs    Patient here with a chief complaint of right hip pain.  Patient's right hip continues to be painful.  He has already had a left total hip arthroplasty on the other side and is happy with his left side.  His right side is slowly becoming more symptomatic.  It has not affected his ability to perform ADLs and his quality of life.  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  He reports that he has stopped smoking since last time he had surgery.    Past History  Past Medical History:   Diagnosis Date    Arthritis     H/O degenerative disc disease     Hypertension     Mild intermittent asthma without complication 9/7/2022    Osteoarthritis     Perthes disease 1995    RIGHT HIP     Past Surgical History:   Procedure Laterality Date    HIP SURGERY Right 1995    TOTAL HIP ARTHROPLASTY Left 3/28/2022    LEFT TOTAL HIP ARTHROPLASTY, MINIMALLY INVASIVE DIRECT ANTERIOR WITH FASCIAILIACA BLOCK FOR PAIN CONTROL               ANDREA BIOMET performed by Michael Macario MD at [facility] OR     Family History   Problem Relation Age of Onset    Diabetes Mother     High Cholesterol Mother     High Blood Pressure Mother     High Blood Pressure Father     High Cholesterol Father     Cancer Father         skin cancer    Heart Attack Maternal Aunt     Cancer Maternal Aunt         stomach cancer    Heart Attack Maternal Uncle     Alcohol Abuse Paternal Uncle     Stroke Maternal Grandfather

## 2024-03-12 ASSESSMENT — PATIENT HEALTH QUESTIONNAIRE - PHQ9
SUM OF ALL RESPONSES TO PHQ9 QUESTIONS 1 & 2: 0
1. LITTLE INTEREST OR PLEASURE IN DOING THINGS: NOT AT ALL
SUM OF ALL RESPONSES TO PHQ QUESTIONS 1-9: 0
2. FEELING DOWN, DEPRESSED OR HOPELESS: NOT AT ALL
SUM OF ALL RESPONSES TO PHQ QUESTIONS 1-9: 0
1. LITTLE INTEREST OR PLEASURE IN DOING THINGS: NOT AT ALL
SUM OF ALL RESPONSES TO PHQ9 QUESTIONS 1 & 2: 0
SUM OF ALL RESPONSES TO PHQ QUESTIONS 1-9: 0
SUM OF ALL RESPONSES TO PHQ QUESTIONS 1-9: 0
2. FEELING DOWN, DEPRESSED OR HOPELESS: NOT AT ALL

## 2024-03-15 ENCOUNTER — OFFICE VISIT (OUTPATIENT)
Dept: FAMILY MEDICINE CLINIC | Age: 39
End: 2024-03-15
Payer: COMMERCIAL

## 2024-03-15 ENCOUNTER — TELEPHONE (OUTPATIENT)
Dept: ADMINISTRATIVE | Age: 39
End: 2024-03-15

## 2024-03-15 VITALS
SYSTOLIC BLOOD PRESSURE: 138 MMHG | OXYGEN SATURATION: 96 % | HEART RATE: 95 BPM | DIASTOLIC BLOOD PRESSURE: 80 MMHG | WEIGHT: 218 LBS | BODY MASS INDEX: 34.14 KG/M2

## 2024-03-15 DIAGNOSIS — R53.83 OTHER FATIGUE: ICD-10-CM

## 2024-03-15 DIAGNOSIS — Z01.818 PRE-OP EXAMINATION: Primary | ICD-10-CM

## 2024-03-15 DIAGNOSIS — E78.00 PURE HYPERCHOLESTEROLEMIA: ICD-10-CM

## 2024-03-15 PROCEDURE — 3079F DIAST BP 80-89 MM HG: CPT | Performed by: PHYSICIAN ASSISTANT

## 2024-03-15 PROCEDURE — 93000 ELECTROCARDIOGRAM COMPLETE: CPT | Performed by: PHYSICIAN ASSISTANT

## 2024-03-15 PROCEDURE — 3075F SYST BP GE 130 - 139MM HG: CPT | Performed by: PHYSICIAN ASSISTANT

## 2024-03-15 PROCEDURE — 99214 OFFICE O/P EST MOD 30 MIN: CPT | Performed by: PHYSICIAN ASSISTANT

## 2024-03-15 RX ORDER — TESTOSTERONE CYPIONATE 200 MG/ML
250 INJECTION, SOLUTION INTRAMUSCULAR ONCE
COMMUNITY

## 2024-03-15 NOTE — PROGRESS NOTES
Preoperative Consultation      Noam Arita .  YOB: 1985    Date of Service:  3/15/2024    Vitals:    03/15/24 0833   BP: 138/80   Site: Right Upper Arm   Position: Sitting   Cuff Size: Medium Adult   Pulse: 95   SpO2: 96%   Weight: 98.9 kg (218 lb)      Wt Readings from Last 2 Encounters:   03/15/24 98.9 kg (218 lb)   03/07/24 94.8 kg (209 lb)     BP Readings from Last 3 Encounters:   03/15/24 138/80   12/27/23 126/70   09/25/23 (!) 140/70        Chief Complaint   Patient presents with    6 Month Follow-Up     HTN     Allergies   Allergen Reactions    Cefepime Itching and Swelling     Lip swelling.       Outpatient Medications Marked as Taking for the 3/15/24 encounter (Office Visit) with Karlee Diaz PA   Medication Sig Dispense Refill    mupirocin (BACTROBAN) 2 % ointment Apply twice daily to each nare for 5 days prior to surgical procedure 1 g 0    lisinopril (PRINIVIL;ZESTRIL) 10 MG tablet TAKE 1 TABLET BY MOUTH EVERY DAY 90 tablet 1    famotidine (PEPCID) 10 MG tablet Take 1 tablet by mouth daily as needed         This patient presents to the office today for a preoperative consultation at the request of surgeon, Dr. Macario, who plans on performing right total hip arthroplasty, posterior zain on April 8 at [facility].  The current problem began over a year ago, and symptoms have been worsening with time.  Conservative therapy: Yes: injection, which has been not very effective..    Planned anesthesia: General   Known anesthesia problems: None   Bleeding risk: No recent or remote history of abnormal bleeding  Personal or FH of DVT/PE: No    Patient objection to receiving blood products: No    Patient Active Problem List   Diagnosis    Class 2 severe obesity due to excess calories with serious comorbidity and body mass index (BMI) of 37.0 to 37.9 in adult (HCC)    Gastroesophageal reflux disease    Essential hypertension    Mild intermittent asthma without

## 2024-03-15 NOTE — TELEPHONE ENCOUNTER
Auth: NPR  Date: 4/8/2024  Reference # [id number]  Spoke with: Online  Type of SX: Outpatient   Location: [facility]  CPT: 29408   DX: M16.31, M91.11  SX area: Right Hip   Insurance: [employer]

## 2024-03-21 ENCOUNTER — TELEPHONE (OUTPATIENT)
Dept: ORTHOPEDIC SURGERY | Age: 39
End: 2024-03-21

## 2024-03-22 ENCOUNTER — HOSPITAL ENCOUNTER (OUTPATIENT)
Dept: PHYSICAL THERAPY | Age: 39
Setting detail: THERAPIES SERIES
Discharge: HOME | End: 2024-03-22
Payer: COMMERCIAL

## 2024-03-22 DIAGNOSIS — Z01.818 PRE-OP TESTING: ICD-10-CM

## 2024-03-22 DIAGNOSIS — E78.00 PURE HYPERCHOLESTEROLEMIA: ICD-10-CM

## 2024-03-22 DIAGNOSIS — R53.83 OTHER FATIGUE: ICD-10-CM

## 2024-03-22 DIAGNOSIS — M16.11 PRIMARY OSTEOARTHRITIS OF RIGHT HIP: ICD-10-CM

## 2024-03-22 DIAGNOSIS — M25.551 RIGHT HIP PAIN: Primary | ICD-10-CM

## 2024-03-22 DIAGNOSIS — R79.89 ELEVATED TESTOSTERONE LEVEL IN MALE: Primary | ICD-10-CM

## 2024-03-22 LAB
ABO + RH BLD: NORMAL
ALBUMIN SERPL-MCNC: 4.1 G/DL (ref 3.4–5)
ANION GAP SERPL CALCULATED.3IONS-SCNC: 10 MMOL/L (ref 3–16)
APTT BLD: 29.8 SEC (ref 22.7–35.9)
BACTERIA URNS QL MICRO: NORMAL /HPF
BASOPHILS # BLD: 0.1 K/UL (ref 0–0.2)
BASOPHILS NFR BLD: 0.6 %
BILIRUB UR QL STRIP.AUTO: NEGATIVE
BLD GP AB SCN SERPL QL: NORMAL
BUN SERPL-MCNC: 13 MG/DL (ref 7–20)
CALCIUM SERPL-MCNC: 9.3 MG/DL (ref 8.3–10.6)
CHLORIDE SERPL-SCNC: 103 MMOL/L (ref 99–110)
CHOLEST SERPL-MCNC: 155 MG/DL (ref 0–199)
CLARITY UR: CLEAR
CO2 SERPL-SCNC: 24 MMOL/L (ref 21–32)
COLOR UR: YELLOW
CREAT SERPL-MCNC: 0.8 MG/DL (ref 0.9–1.3)
DEPRECATED RDW RBC AUTO: 14.2 % (ref 12.4–15.4)
EOSINOPHIL # BLD: 0.3 K/UL (ref 0–0.6)
EOSINOPHIL NFR BLD: 3.2 %
EPI CELLS #/AREA URNS AUTO: 0 /HPF (ref 0–5)
GFR SERPLBLD CREATININE-BSD FMLA CKD-EPI: >60 ML/MIN/{1.73_M2}
GLUCOSE SERPL-MCNC: 87 MG/DL (ref 70–99)
GLUCOSE UR STRIP.AUTO-MCNC: NEGATIVE MG/DL
HCT VFR BLD AUTO: 53.9 % (ref 40.5–52.5)
HDLC SERPL-MCNC: 40 MG/DL (ref 40–60)
HGB BLD-MCNC: 18.5 G/DL (ref 13.5–17.5)
HGB UR QL STRIP.AUTO: NEGATIVE
HYALINE CASTS #/AREA URNS AUTO: 0 /LPF (ref 0–8)
INR PPP: 1.02 (ref 0.84–1.16)
KETONES UR STRIP.AUTO-MCNC: ABNORMAL MG/DL
LDLC SERPL CALC-MCNC: 104 MG/DL
LEUKOCYTE ESTERASE UR QL STRIP.AUTO: ABNORMAL
LYMPHOCYTES # BLD: 2.4 K/UL (ref 1–5.1)
LYMPHOCYTES NFR BLD: 25 %
MCH RBC QN AUTO: 33.1 PG (ref 26–34)
MCHC RBC AUTO-ENTMCNC: 34.3 G/DL (ref 31–36)
MCV RBC AUTO: 96.4 FL (ref 80–100)
MONOCYTES # BLD: 0.7 K/UL (ref 0–1.3)
MONOCYTES NFR BLD: 6.8 %
NEUTROPHILS # BLD: 6.2 K/UL (ref 1.7–7.7)
NEUTROPHILS NFR BLD: 64.4 %
NITRITE UR QL STRIP.AUTO: NEGATIVE
PH UR STRIP.AUTO: 6.5 [PH] (ref 5–8)
PLATELET # BLD AUTO: 242 K/UL (ref 135–450)
PMV BLD AUTO: 8.9 FL (ref 5–10.5)
POTASSIUM SERPL-SCNC: 4.9 MMOL/L (ref 3.5–5.1)
PROT UR STRIP.AUTO-MCNC: NEGATIVE MG/DL
PROTHROMBIN TIME: 13.4 SEC (ref 11.5–14.8)
RBC # BLD AUTO: 5.59 M/UL (ref 4.2–5.9)
RBC CLUMPS #/AREA URNS AUTO: 2 /HPF (ref 0–4)
SODIUM SERPL-SCNC: 137 MMOL/L (ref 136–145)
SP GR UR STRIP.AUTO: 1.02 (ref 1–1.03)
TRANSFERRIN SERPL-MCNC: 257 MG/DL (ref 200–360)
TRIGL SERPL-MCNC: 53 MG/DL (ref 0–150)
UA DIPSTICK W REFLEX MICRO PNL UR: YES
URN SPEC COLLECT METH UR: ABNORMAL
UROBILINOGEN UR STRIP-ACNC: 0.2 E.U./DL
VLDLC SERPL CALC-MCNC: 11 MG/DL
WBC # BLD AUTO: 9.7 K/UL (ref 4–11)
WBC #/AREA URNS AUTO: 0 /HPF (ref 0–5)

## 2024-03-22 PROCEDURE — 97161 PT EVAL LOW COMPLEX 20 MIN: CPT

## 2024-03-22 NOTE — PLAN OF CARE
functional positions  Reduced ability to ambulate prolonged functional periods/distances/surfaces  difficulty with self care    Participation Restrictions:   Reduced participation in self care  Reduced participation in work activities  Reduced participation in sports/recreation    Classification :   Pre hab for LIZETT    Barriers to/and or personal factors that will affect rehab potential:   None noted    Physical Therapy Evaluation Complexity Justification  [x] A history of present problem and no personal factors and/or co-morbidities that impact the plan of care  [x] A total of 1-2 elements  found upon examination of body systems using standardized tests and measures addressing any of the following: body structures, functions (impairments), activity limitations, and/or participation restrictions  [x] A clinical presentation with stable and/or uncomplicated characteristics   [x] Clinical decision making of LOW (85815 - Typically 20 minutes face-to-face) complexity using standardized patient assessment instrument and/or measurable assessment of functional outcome.    Today's Assessment: See above    Medical Necessity Documentation:  I certify that this patient meets the below criteria necessary for medical necessity for care and/or justification of therapy services:  The patient has functional impairments and/or activity limitations and would benefit from continued outpatient therapy services to address the deficits outlined in the patients goals      Return to Play: NA    Prognosis for POC: [x] Good [] Fair  [] Poor    Patient requires continued skilled intervention: [x] Yes  [] No      CHARGE CAPTURE     PT CHARGE GRID   CPT Code (TIMED) minutes # CPT Code (UNTIMED) #     Therex (24073)     EVAL:LOW (91604 - Typically 20 minutes face-to-face) 1    Neuromusc. Re-ed (21863)    Re-Eval (25310)     Manual (77279)    Estim Unattended (11154)     Ther. Act (14521)    Mech. Traction (05697)     Gait (00053)    Dry Needle

## 2024-03-23 LAB
EST. AVERAGE GLUCOSE BLD GHB EST-MCNC: 82.5 MG/DL
HBA1C MFR BLD: 4.5 %

## 2024-03-24 LAB — BACTERIA UR CULT: NORMAL

## 2024-03-25 LAB — MRSA SPEC QL CULT: NORMAL

## 2024-03-27 LAB
SHBG SERPL-SCNC: 41 NMOL/L (ref 17–56)
TESTOST FREE SERPL-MCNC: ABNORMAL PG/ML (ref 47–244)
TESTOST SERPL-MCNC: >1500 NG/DL (ref 249–836)

## 2024-04-02 ENCOUNTER — TELEPHONE (OUTPATIENT)
Dept: ORTHOPEDIC SURGERY | Age: 39
End: 2024-04-02

## 2024-04-02 NOTE — TELEPHONE ENCOUNTER
ORTHOPAEDIC NURSE NAVIGATOR SUMMARY NOTE      Anticipated Date of Surgery: 4/5/24    Recieved Pre-Op Education: yes   In person class:no  Pt used educational link:Yes   Pt completed pre and post test to measure learning:Yes    If pt did not complete either, why not?  N/A      PCP: Karlee Diaz PA   Phone #: [phone]    Date of PCP Visit for H&P: 3/15/24    Any Noted Concerns from PCP prior to surgery:  No   If Yes, what concerns?:    IS THE PATIENT IN A PAIN MANAGEMENT PROGRAM?:   No     Review of Past Medical History Reveals History of:      Critical Lab Values:   Hgb/Hct:   Hemoglobin (g/dL)   Date Value   03/22/2024 18.5 (H)   /  Hematocrit (%)   Date Value   03/22/2024 53.9 (H)      HgbA1C:    Lab Results   Component Value Date    LABA1C 4.5 03/22/2024    LABA1C 5.2 03/11/2022      Albumin:    Lab Results   Component Value Date    LABALBU 4.1 03/22/2024      BUN/Cr:   BUN (mg/dL)   Date Value   03/22/2024 13   /  Creatinine (mg/dL)   Date Value   03/22/2024 0.8 (L)      BMI:    BMI Readings from Last 1 Encounters:   03/15/24 34.14 kg/m²        Coronary Artery Disease/HTN/CHF History: Yes- HTN       -On any anticoagulation-none       Diabetes History: No     Pulmonary: COPD/Emphysema/ Use of home oxygen:      Alcohol use:        DVT Risk Stratification:  Low      Vascular Consult Ordered:                      Discharge Disposition Information:     Attended Pre-Hab Program: yes     Anticipated Discharge Disposition:  Home with no PT    Who will be with patient at home following discharge?  Wife, kids, mom and dad      Equipment pt already has:  walker   Bedroom on first or second floor: first   Bathroom on first or second floor: first   Weight bearing status: full   Pre-op ambulatory status: none   Number of entry steps: zero   Caregiver assistance: full time    Pt plan to DC same day: Ok to    [facility] preference: RANI AWAD RN  4/2/2024

## 2024-04-03 ENCOUNTER — TELEMEDICINE (OUTPATIENT)
Age: 39
End: 2024-04-03
Payer: COMMERCIAL

## 2024-04-03 DIAGNOSIS — J02.9 SORE THROAT: ICD-10-CM

## 2024-04-03 DIAGNOSIS — R09.81 NASAL CONGESTION: ICD-10-CM

## 2024-04-03 DIAGNOSIS — R09.89 RUNNY NOSE: ICD-10-CM

## 2024-04-03 DIAGNOSIS — R52 BODY ACHES: ICD-10-CM

## 2024-04-03 DIAGNOSIS — R05.1 ACUTE COUGH: Primary | ICD-10-CM

## 2024-04-03 PROCEDURE — 99213 OFFICE O/P EST LOW 20 MIN: CPT | Performed by: NURSE PRACTITIONER

## 2024-04-03 ASSESSMENT — ENCOUNTER SYMPTOMS
WHEEZING: 0
COUGH: 1
RHINORRHEA: 1
SHORTNESS OF BREATH: 0
CHEST TIGHTNESS: 1

## 2024-04-03 NOTE — PROGRESS NOTES
Noam Arita Jr., was evaluated through a synchronous (real-time) audio-video encounter. The patient (or guardian if applicable) is aware that this is a billable service, which includes applicable co-pays. This Virtual Visit was conducted with patient's (and/or legal guardian's) consent. Patient identification was verified, and a caregiver was present when appropriate.   The patient was located at Home: [address] [place] [id number]  Provider was located at Home (Appt Dept State): [place]  Confirm you are appropriately licensed, registered, or certified to deliver care in the state where the patient is located as indicated above. If you are not or unsure, please re-schedule the visit: Yes, I confirm.     Noam Arita Jr. (:  1985) is a Established patient, presenting virtually for evaluation of the following:  Symptoms started Monday, pt c/o cough with yellow mucus production, sore throat, nasal congestion/drainage, green/yellow mucus when blowing nose. Pt denies fever, chills, or body aches.  Assessment & Plan   Below is the assessment and plan developed based on review of pertinent history, physical exam, labs, studies, and medications.  1. Acute cough  Problem  Continue over-the-counter Mucinex DM  Patient states he has benzonatate from previous back in December.  He was instructed to take these during the day for his cough  See patient instructions    A cough is your body's response to something that bothers your throat or airways. Many things can cause a cough. You might cough because of a cold or the flu, bronchitis, or asthma. Smoking, postnasal drip, allergies, and stomach acid that backs up into your throat also can cause coughs.  A cough is a symptom, not a disease. Most coughs stop when the cause, such as a cold, goes away. You can take a few steps at home to cough less and feel better.  Follow-up care is a key part of your treatment and safety. Be sure to make and go to

## 2024-04-04 DIAGNOSIS — J06.9 ACUTE URI: Primary | ICD-10-CM

## 2024-04-04 RX ORDER — AZITHROMYCIN 250 MG/1
TABLET, FILM COATED ORAL
Qty: 6 TABLET | Refills: 0 | Status: SHIPPED | OUTPATIENT
Start: 2024-04-04 | End: 2024-04-14

## 2024-04-04 NOTE — PROGRESS NOTES
Patient is scheduled to have surgery on 4/8/24. He has a productive cough with purulent sputum and sinus congestion. He is concerned that this is the start of pneumonia and will cause a problem with his surgery on Monday. After speaking with his surgeon, antibiotics are recommended. I will send in a z-pack as discussed with VANE Winchester.

## 2024-04-05 ENCOUNTER — TELEPHONE (OUTPATIENT)
Dept: ORTHOPEDIC SURGERY | Age: 39
End: 2024-04-05

## 2024-04-05 NOTE — TELEPHONE ENCOUNTER
Surgery 04/08/2024 A 1:00 pm   ARRIVAL 11:00 am    LM / Please call back to confirm  [phone]        CONFIRMED!!!!      jm

## 2024-04-08 ENCOUNTER — TELEPHONE (OUTPATIENT)
Dept: ORTHOPEDIC SURGERY | Age: 39
End: 2024-04-08

## 2024-04-08 NOTE — TELEPHONE ENCOUNTER
Surgery and/or Procedure Scheduling     Contact Name: Noam Arita Jr.   Surgical/Procedure Request: R HIP   Patient Contact Number: [phone]     PATIENT WOULD LIKE TO RESCHEDULE SX

## 2024-04-09 ENCOUNTER — TELEPHONE (OUTPATIENT)
Dept: ORTHOPEDIC SURGERY | Age: 39
End: 2024-04-09

## 2024-04-09 ENCOUNTER — ANESTHESIA EVENT (OUTPATIENT)
Dept: OPERATING ROOM | Age: 39
End: 2024-04-09
Payer: COMMERCIAL

## 2024-04-09 NOTE — TELEPHONE ENCOUNTER
Auth: NPR  Date: 04/11/24  Reference # [id number]   Spoke with: Online  Type of SX:Outpatient  Location: [facility]  CPT: 37053    DX: M16.31 / M91.11   SX area: RIGHT HIP  Insurance: [employer]

## 2024-04-11 ENCOUNTER — ANESTHESIA (OUTPATIENT)
Dept: OPERATING ROOM | Age: 39
End: 2024-04-11
Payer: COMMERCIAL

## 2024-04-11 ENCOUNTER — APPOINTMENT (OUTPATIENT)
Dept: GENERAL RADIOLOGY | Age: 39
End: 2024-04-11
Attending: ORTHOPAEDIC SURGERY
Payer: COMMERCIAL

## 2024-04-11 ENCOUNTER — HOSPITAL ENCOUNTER (OUTPATIENT)
Age: 39
Discharge: HOME | End: 2024-04-12
Attending: ORTHOPAEDIC SURGERY | Admitting: ORTHOPAEDIC SURGERY
Payer: COMMERCIAL

## 2024-04-11 DIAGNOSIS — I10 ESSENTIAL HYPERTENSION: ICD-10-CM

## 2024-04-11 DIAGNOSIS — M16.31 OSTEOARTHRITIS RESULTING FROM RIGHT HIP DYSPLASIA: Primary | ICD-10-CM

## 2024-04-11 PROBLEM — M16.11 PRIMARY LOCALIZED OSTEOARTHRITIS OF RIGHT HIP: Status: ACTIVE | Noted: 2024-04-11

## 2024-04-11 LAB
ABO + RH BLD: NORMAL
BLD GP AB SCN SERPL QL: NORMAL
GLUCOSE BLD-MCNC: 147 MG/DL (ref 70–99)
GLUCOSE BLD-MCNC: 188 MG/DL (ref 70–99)
GLUCOSE BLD-MCNC: 83 MG/DL (ref 70–99)
PERFORMED ON: ABNORMAL
PERFORMED ON: ABNORMAL
PERFORMED ON: NORMAL

## 2024-04-11 PROCEDURE — 6360000002 HC RX W HCPCS: Performed by: ANESTHESIOLOGY

## 2024-04-11 PROCEDURE — 86900 BLOOD TYPING SEROLOGIC ABO: CPT

## 2024-04-11 PROCEDURE — 6370000000 HC RX 637 (ALT 250 FOR IP): Performed by: PHYSICIAN ASSISTANT

## 2024-04-11 PROCEDURE — 2720000010 HC SURG SUPPLY STERILE: Performed by: ORTHOPAEDIC SURGERY

## 2024-04-11 PROCEDURE — 86901 BLOOD TYPING SEROLOGIC RH(D): CPT

## 2024-04-11 PROCEDURE — 7100000000 HC PACU RECOVERY - FIRST 15 MIN: Performed by: ORTHOPAEDIC SURGERY

## 2024-04-11 PROCEDURE — C1713 ANCHOR/SCREW BN/BN,TIS/BN: HCPCS | Performed by: ORTHOPAEDIC SURGERY

## 2024-04-11 PROCEDURE — 3700000001 HC ADD 15 MINUTES (ANESTHESIA): Performed by: ORTHOPAEDIC SURGERY

## 2024-04-11 PROCEDURE — 94761 N-INVAS EAR/PLS OXIMETRY MLT: CPT

## 2024-04-11 PROCEDURE — 2580000003 HC RX 258: Performed by: PHYSICIAN ASSISTANT

## 2024-04-11 PROCEDURE — 2580000003 HC RX 258: Performed by: ANESTHESIOLOGY

## 2024-04-11 PROCEDURE — 3600000004 HC SURGERY LEVEL 4 BASE: Performed by: ORTHOPAEDIC SURGERY

## 2024-04-11 PROCEDURE — 7100000001 HC PACU RECOVERY - ADDTL 15 MIN: Performed by: ORTHOPAEDIC SURGERY

## 2024-04-11 PROCEDURE — 73501 X-RAY EXAM HIP UNI 1 VIEW: CPT

## 2024-04-11 PROCEDURE — 6360000002 HC RX W HCPCS: Performed by: PHYSICIAN ASSISTANT

## 2024-04-11 PROCEDURE — 86850 RBC ANTIBODY SCREEN: CPT

## 2024-04-11 PROCEDURE — 6370000000 HC RX 637 (ALT 250 FOR IP): Performed by: ANESTHESIOLOGY

## 2024-04-11 PROCEDURE — 6360000002 HC RX W HCPCS

## 2024-04-11 PROCEDURE — 2500000003 HC RX 250 WO HCPCS

## 2024-04-11 PROCEDURE — 2580000003 HC RX 258: Performed by: ORTHOPAEDIC SURGERY

## 2024-04-11 PROCEDURE — 97110 THERAPEUTIC EXERCISES: CPT

## 2024-04-11 PROCEDURE — 2500000003 HC RX 250 WO HCPCS: Performed by: ORTHOPAEDIC SURGERY

## 2024-04-11 PROCEDURE — 97166 OT EVAL MOD COMPLEX 45 MIN: CPT

## 2024-04-11 PROCEDURE — 97530 THERAPEUTIC ACTIVITIES: CPT

## 2024-04-11 PROCEDURE — A4217 STERILE WATER/SALINE, 500 ML: HCPCS | Performed by: ORTHOPAEDIC SURGERY

## 2024-04-11 PROCEDURE — 6360000002 HC RX W HCPCS: Performed by: ORTHOPAEDIC SURGERY

## 2024-04-11 PROCEDURE — 2700000000 HC OXYGEN THERAPY PER DAY

## 2024-04-11 PROCEDURE — 3700000000 HC ANESTHESIA ATTENDED CARE: Performed by: ORTHOPAEDIC SURGERY

## 2024-04-11 PROCEDURE — C1776 JOINT DEVICE (IMPLANTABLE): HCPCS | Performed by: ORTHOPAEDIC SURGERY

## 2024-04-11 PROCEDURE — 2709999900 HC NON-CHARGEABLE SUPPLY: Performed by: ORTHOPAEDIC SURGERY

## 2024-04-11 PROCEDURE — 97161 PT EVAL LOW COMPLEX 20 MIN: CPT

## 2024-04-11 PROCEDURE — 97116 GAIT TRAINING THERAPY: CPT

## 2024-04-11 PROCEDURE — 3600000014 HC SURGERY LEVEL 4 ADDTL 15MIN: Performed by: ORTHOPAEDIC SURGERY

## 2024-04-11 PROCEDURE — 2500000003 HC RX 250 WO HCPCS: Performed by: PHYSICIAN ASSISTANT

## 2024-04-11 PROCEDURE — 2580000003 HC RX 258

## 2024-04-11 DEVICE — LINER ACET NEUT G 40 MM VIVACIT-E G7: Type: IMPLANTABLE DEVICE | Site: HIP | Status: FUNCTIONAL

## 2024-04-11 DEVICE — BONE SCREW 6.5X35 SELF-TAP: Type: IMPLANTABLE DEVICE | Site: HIP | Status: FUNCTIONAL

## 2024-04-11 DEVICE — HEAD FEM DIA40MM HIP BIOLOX DELT OPT FOR G7 ACET SYS: Type: IMPLANTABLE DEVICE | Site: HIP | Status: FUNCTIONAL

## 2024-04-11 DEVICE — SLEEVE FEM -3MM OFFSET HIP TYP 1 TAPR FOR CERAMIC BIOLOX: Type: IMPLANTABLE DEVICE | Site: HIP | Status: FUNCTIONAL

## 2024-04-11 DEVICE — TPRLC 133 FP TYPE1 PPS SO 6.0: Type: IMPLANTABLE DEVICE | Site: HIP | Status: FUNCTIONAL

## 2024-04-11 DEVICE — G7 OSSEOTI 4 HOLE SHELL 58MM G: Type: IMPLANTABLE DEVICE | Site: HIP | Status: FUNCTIONAL

## 2024-04-11 RX ORDER — MORPHINE SULFATE 4 MG/ML
4 INJECTION, SOLUTION INTRAMUSCULAR; INTRAVENOUS
Status: DISCONTINUED | OUTPATIENT
Start: 2024-04-11 | End: 2024-04-11

## 2024-04-11 RX ORDER — MELOXICAM 7.5 MG/1
15 TABLET ORAL ONCE
Status: COMPLETED | OUTPATIENT
Start: 2024-04-11 | End: 2024-04-11

## 2024-04-11 RX ORDER — ONDANSETRON 2 MG/ML
4 INJECTION INTRAMUSCULAR; INTRAVENOUS EVERY 6 HOURS PRN
Status: DISCONTINUED | OUTPATIENT
Start: 2024-04-11 | End: 2024-04-12 | Stop reason: HOSPADM

## 2024-04-11 RX ORDER — OXYCODONE HYDROCHLORIDE 5 MG/1
10 TABLET ORAL EVERY 4 HOURS PRN
Status: DISCONTINUED | OUTPATIENT
Start: 2024-04-11 | End: 2024-04-12 | Stop reason: HOSPADM

## 2024-04-11 RX ORDER — ONDANSETRON 2 MG/ML
INJECTION INTRAMUSCULAR; INTRAVENOUS PRN
Status: DISCONTINUED | OUTPATIENT
Start: 2024-04-11 | End: 2024-04-11 | Stop reason: SDUPTHER

## 2024-04-11 RX ORDER — DEXAMETHASONE SODIUM PHOSPHATE 4 MG/ML
INJECTION, SOLUTION INTRA-ARTICULAR; INTRALESIONAL; INTRAMUSCULAR; INTRAVENOUS; SOFT TISSUE PRN
Status: DISCONTINUED | OUTPATIENT
Start: 2024-04-11 | End: 2024-04-11 | Stop reason: SDUPTHER

## 2024-04-11 RX ORDER — OXYCODONE HYDROCHLORIDE 5 MG/1
10 TABLET ORAL PRN
Status: DISCONTINUED | OUTPATIENT
Start: 2024-04-11 | End: 2024-04-11 | Stop reason: HOSPADM

## 2024-04-11 RX ORDER — ONDANSETRON 4 MG/1
4 TABLET, FILM COATED ORAL 3 TIMES DAILY PRN
Qty: 15 TABLET | Refills: 0 | Status: SHIPPED | OUTPATIENT
Start: 2024-04-11

## 2024-04-11 RX ORDER — INSULIN LISPRO 100 [IU]/ML
0-4 INJECTION, SOLUTION INTRAVENOUS; SUBCUTANEOUS NIGHTLY
Status: DISCONTINUED | OUTPATIENT
Start: 2024-04-11 | End: 2024-04-12 | Stop reason: HOSPADM

## 2024-04-11 RX ORDER — SODIUM CHLORIDE 0.9 % (FLUSH) 0.9 %
5-40 SYRINGE (ML) INJECTION EVERY 12 HOURS SCHEDULED
Status: DISCONTINUED | OUTPATIENT
Start: 2024-04-11 | End: 2024-04-12 | Stop reason: HOSPADM

## 2024-04-11 RX ORDER — OXYCODONE HYDROCHLORIDE 5 MG/1
5 TABLET ORAL
Status: DISCONTINUED | OUTPATIENT
Start: 2024-04-11 | End: 2024-04-11 | Stop reason: HOSPADM

## 2024-04-11 RX ORDER — MIDAZOLAM HYDROCHLORIDE 1 MG/ML
INJECTION INTRAMUSCULAR; INTRAVENOUS PRN
Status: DISCONTINUED | OUTPATIENT
Start: 2024-04-11 | End: 2024-04-11 | Stop reason: SDUPTHER

## 2024-04-11 RX ORDER — MELOXICAM 7.5 MG/1
7.5 TABLET ORAL DAILY
Status: DISCONTINUED | OUTPATIENT
Start: 2024-04-12 | End: 2024-04-12

## 2024-04-11 RX ORDER — MELOXICAM 15 MG/1
15 TABLET ORAL DAILY
Qty: 30 TABLET | Refills: 0 | Status: SHIPPED | OUTPATIENT
Start: 2024-04-11 | End: 2024-04-12

## 2024-04-11 RX ORDER — IPRATROPIUM BROMIDE AND ALBUTEROL SULFATE 2.5; .5 MG/3ML; MG/3ML
1 SOLUTION RESPIRATORY (INHALATION)
Status: DISCONTINUED | OUTPATIENT
Start: 2024-04-11 | End: 2024-04-11 | Stop reason: HOSPADM

## 2024-04-11 RX ORDER — SODIUM CHLORIDE 0.9 % (FLUSH) 0.9 %
5-40 SYRINGE (ML) INJECTION PRN
Status: DISCONTINUED | OUTPATIENT
Start: 2024-04-11 | End: 2024-04-11 | Stop reason: HOSPADM

## 2024-04-11 RX ORDER — MAGNESIUM SULFATE IN WATER 40 MG/ML
2000 INJECTION, SOLUTION INTRAVENOUS ONCE
Status: COMPLETED | OUTPATIENT
Start: 2024-04-11 | End: 2024-04-11

## 2024-04-11 RX ORDER — SODIUM CHLORIDE, SODIUM LACTATE, POTASSIUM CHLORIDE, CALCIUM CHLORIDE 600; 310; 30; 20 MG/100ML; MG/100ML; MG/100ML; MG/100ML
INJECTION, SOLUTION INTRAVENOUS CONTINUOUS
Status: DISCONTINUED | OUTPATIENT
Start: 2024-04-11 | End: 2024-04-12 | Stop reason: HOSPADM

## 2024-04-11 RX ORDER — SODIUM CHLORIDE 9 MG/ML
INJECTION, SOLUTION INTRAVENOUS PRN
Status: DISCONTINUED | OUTPATIENT
Start: 2024-04-11 | End: 2024-04-11 | Stop reason: HOSPADM

## 2024-04-11 RX ORDER — SODIUM CHLORIDE 0.9 % (FLUSH) 0.9 %
5-40 SYRINGE (ML) INJECTION PRN
Status: DISCONTINUED | OUTPATIENT
Start: 2024-04-11 | End: 2024-04-12 | Stop reason: HOSPADM

## 2024-04-11 RX ORDER — KETOROLAC TROMETHAMINE 30 MG/ML
30 INJECTION, SOLUTION INTRAMUSCULAR; INTRAVENOUS ONCE
Status: COMPLETED | OUTPATIENT
Start: 2024-04-11 | End: 2024-04-11

## 2024-04-11 RX ORDER — SULFAMETHOXAZOLE AND TRIMETHOPRIM 800; 160 MG/1; MG/1
1 TABLET ORAL 2 TIMES DAILY
Qty: 2 TABLET | Refills: 0 | Status: SHIPPED | OUTPATIENT
Start: 2024-04-11 | End: 2024-04-12 | Stop reason: HOSPADM

## 2024-04-11 RX ORDER — OXYCODONE HYDROCHLORIDE 5 MG/1
5 TABLET ORAL EVERY 6 HOURS PRN
Qty: 28 TABLET | Refills: 0 | Status: SHIPPED | OUTPATIENT
Start: 2024-04-11 | End: 2024-04-12

## 2024-04-11 RX ORDER — CYCLOBENZAPRINE HCL 10 MG
10 TABLET ORAL 3 TIMES DAILY PRN
Qty: 30 TABLET | Refills: 0 | Status: SHIPPED | OUTPATIENT
Start: 2024-04-11 | End: 2024-04-12

## 2024-04-11 RX ORDER — MEPERIDINE HYDROCHLORIDE 50 MG/ML
12.5 INJECTION INTRAMUSCULAR; INTRAVENOUS; SUBCUTANEOUS EVERY 5 MIN PRN
Status: DISCONTINUED | OUTPATIENT
Start: 2024-04-11 | End: 2024-04-11 | Stop reason: HOSPADM

## 2024-04-11 RX ORDER — ROCURONIUM BROMIDE 10 MG/ML
INJECTION, SOLUTION INTRAVENOUS PRN
Status: DISCONTINUED | OUTPATIENT
Start: 2024-04-11 | End: 2024-04-11 | Stop reason: SDUPTHER

## 2024-04-11 RX ORDER — ONDANSETRON 2 MG/ML
4 INJECTION INTRAMUSCULAR; INTRAVENOUS
Status: DISCONTINUED | OUTPATIENT
Start: 2024-04-11 | End: 2024-04-11 | Stop reason: HOSPADM

## 2024-04-11 RX ORDER — MAGNESIUM HYDROXIDE 1200 MG/15ML
LIQUID ORAL CONTINUOUS PRN
Status: COMPLETED | OUTPATIENT
Start: 2024-04-11 | End: 2024-04-11

## 2024-04-11 RX ORDER — DEXTROSE MONOHYDRATE 100 MG/ML
INJECTION, SOLUTION INTRAVENOUS CONTINUOUS PRN
Status: DISCONTINUED | OUTPATIENT
Start: 2024-04-11 | End: 2024-04-12 | Stop reason: HOSPADM

## 2024-04-11 RX ORDER — GLUCAGON 1 MG/ML
1 KIT INJECTION PRN
Status: DISCONTINUED | OUTPATIENT
Start: 2024-04-11 | End: 2024-04-12 | Stop reason: HOSPADM

## 2024-04-11 RX ORDER — SODIUM CHLORIDE 0.9 % (FLUSH) 0.9 %
5-40 SYRINGE (ML) INJECTION EVERY 12 HOURS SCHEDULED
Status: DISCONTINUED | OUTPATIENT
Start: 2024-04-11 | End: 2024-04-11 | Stop reason: HOSPADM

## 2024-04-11 RX ORDER — LIDOCAINE HYDROCHLORIDE 10 MG/ML
2 INJECTION, SOLUTION INFILTRATION; PERINEURAL
Status: DISCONTINUED | OUTPATIENT
Start: 2024-04-11 | End: 2024-04-11 | Stop reason: HOSPADM

## 2024-04-11 RX ORDER — PROPOFOL 10 MG/ML
INJECTION, EMULSION INTRAVENOUS PRN
Status: DISCONTINUED | OUTPATIENT
Start: 2024-04-11 | End: 2024-04-11 | Stop reason: SDUPTHER

## 2024-04-11 RX ORDER — MORPHINE SULFATE 4 MG/ML
4 INJECTION, SOLUTION INTRAMUSCULAR; INTRAVENOUS EVERY 4 HOURS PRN
Status: DISCONTINUED | OUTPATIENT
Start: 2024-04-11 | End: 2024-04-12

## 2024-04-11 RX ORDER — MORPHINE SULFATE 2 MG/ML
2 INJECTION, SOLUTION INTRAMUSCULAR; INTRAVENOUS
Status: DISCONTINUED | OUTPATIENT
Start: 2024-04-11 | End: 2024-04-11

## 2024-04-11 RX ORDER — INSULIN LISPRO 100 [IU]/ML
0.08 INJECTION, SOLUTION INTRAVENOUS; SUBCUTANEOUS
Status: DISCONTINUED | OUTPATIENT
Start: 2024-04-11 | End: 2024-04-12 | Stop reason: HOSPADM

## 2024-04-11 RX ORDER — SODIUM CHLORIDE 9 MG/ML
INJECTION, SOLUTION INTRAVENOUS PRN
Status: DISCONTINUED | OUTPATIENT
Start: 2024-04-11 | End: 2024-04-12 | Stop reason: HOSPADM

## 2024-04-11 RX ORDER — NALOXONE HYDROCHLORIDE 0.4 MG/ML
INJECTION, SOLUTION INTRAMUSCULAR; INTRAVENOUS; SUBCUTANEOUS PRN
Status: DISCONTINUED | OUTPATIENT
Start: 2024-04-11 | End: 2024-04-11 | Stop reason: HOSPADM

## 2024-04-11 RX ORDER — INSULIN LISPRO 100 [IU]/ML
0-8 INJECTION, SOLUTION INTRAVENOUS; SUBCUTANEOUS
Status: DISCONTINUED | OUTPATIENT
Start: 2024-04-11 | End: 2024-04-12 | Stop reason: HOSPADM

## 2024-04-11 RX ORDER — ACETAMINOPHEN 500 MG
1000 TABLET ORAL ONCE
Status: COMPLETED | OUTPATIENT
Start: 2024-04-11 | End: 2024-04-11

## 2024-04-11 RX ORDER — TRAMADOL HYDROCHLORIDE 50 MG/1
100 TABLET ORAL ONCE
Status: COMPLETED | OUTPATIENT
Start: 2024-04-11 | End: 2024-04-11

## 2024-04-11 RX ORDER — METHOCARBAMOL 750 MG/1
750 TABLET, FILM COATED ORAL EVERY 8 HOURS PRN
Status: ACTIVE | OUTPATIENT
Start: 2024-04-11 | End: 2024-04-11

## 2024-04-11 RX ORDER — VANCOMYCIN HYDROCHLORIDE 1 G/20ML
INJECTION, POWDER, LYOPHILIZED, FOR SOLUTION INTRAVENOUS PRN
Status: DISCONTINUED | OUTPATIENT
Start: 2024-04-11 | End: 2024-04-11 | Stop reason: ALTCHOICE

## 2024-04-11 RX ORDER — ACETAMINOPHEN 325 MG/1
650 TABLET ORAL EVERY 6 HOURS
Status: DISCONTINUED | OUTPATIENT
Start: 2024-04-11 | End: 2024-04-12 | Stop reason: HOSPADM

## 2024-04-11 RX ORDER — PROCHLORPERAZINE EDISYLATE 5 MG/ML
5 INJECTION INTRAMUSCULAR; INTRAVENOUS
Status: DISCONTINUED | OUTPATIENT
Start: 2024-04-11 | End: 2024-04-11 | Stop reason: HOSPADM

## 2024-04-11 RX ORDER — METHYLPREDNISOLONE 4 MG/1
TABLET ORAL
Qty: 1 KIT | Refills: 0 | Status: SHIPPED | OUTPATIENT
Start: 2024-04-11 | End: 2024-04-12

## 2024-04-11 RX ORDER — HYDROMORPHONE HYDROCHLORIDE 2 MG/ML
INJECTION, SOLUTION INTRAMUSCULAR; INTRAVENOUS; SUBCUTANEOUS PRN
Status: DISCONTINUED | OUTPATIENT
Start: 2024-04-11 | End: 2024-04-11 | Stop reason: SDUPTHER

## 2024-04-11 RX ORDER — ONDANSETRON 4 MG/1
4 TABLET, ORALLY DISINTEGRATING ORAL EVERY 8 HOURS PRN
Status: DISCONTINUED | OUTPATIENT
Start: 2024-04-11 | End: 2024-04-12 | Stop reason: HOSPADM

## 2024-04-11 RX ORDER — OXYCODONE HYDROCHLORIDE 5 MG/1
5 TABLET ORAL EVERY 4 HOURS PRN
Status: DISCONTINUED | OUTPATIENT
Start: 2024-04-11 | End: 2024-04-12 | Stop reason: HOSPADM

## 2024-04-11 RX ORDER — SENNOSIDES A AND B 8.6 MG/1
1 TABLET, FILM COATED ORAL 2 TIMES DAILY PRN
Status: DISCONTINUED | OUTPATIENT
Start: 2024-04-11 | End: 2024-04-12 | Stop reason: HOSPADM

## 2024-04-11 RX ORDER — ASPIRIN 81 MG/1
81 TABLET, CHEWABLE ORAL 2 TIMES DAILY
Qty: 60 TABLET | Refills: 0 | Status: SHIPPED | OUTPATIENT
Start: 2024-04-12 | End: 2024-04-12

## 2024-04-11 RX ORDER — SODIUM CHLORIDE, SODIUM LACTATE, POTASSIUM CHLORIDE, CALCIUM CHLORIDE 600; 310; 30; 20 MG/100ML; MG/100ML; MG/100ML; MG/100ML
INJECTION, SOLUTION INTRAVENOUS CONTINUOUS
Status: DISCONTINUED | OUTPATIENT
Start: 2024-04-11 | End: 2024-04-11 | Stop reason: HOSPADM

## 2024-04-11 RX ORDER — POLYETHYLENE GLYCOL 3350 17 G/17G
17 POWDER, FOR SOLUTION ORAL DAILY
Status: DISCONTINUED | OUTPATIENT
Start: 2024-04-11 | End: 2024-04-12 | Stop reason: HOSPADM

## 2024-04-11 RX ADMIN — TRANEXAMIC ACID 1000 MG: 100 INJECTION, SOLUTION INTRAVENOUS at 14:15

## 2024-04-11 RX ADMIN — DEXMEDETOMIDINE HYDROCHLORIDE 8 MCG: 100 INJECTION, SOLUTION INTRAVENOUS at 14:23

## 2024-04-11 RX ADMIN — HYDROMORPHONE HYDROCHLORIDE 0.5 MG: 2 INJECTION, SOLUTION INTRAMUSCULAR; INTRAVENOUS; SUBCUTANEOUS at 14:40

## 2024-04-11 RX ADMIN — HYDROMORPHONE HYDROCHLORIDE 0.5 MG: 2 INJECTION, SOLUTION INTRAMUSCULAR; INTRAVENOUS; SUBCUTANEOUS at 14:28

## 2024-04-11 RX ADMIN — HYDROMORPHONE HYDROCHLORIDE 1 MG: 1 INJECTION, SOLUTION INTRAMUSCULAR; INTRAVENOUS; SUBCUTANEOUS at 15:42

## 2024-04-11 RX ADMIN — OXYCODONE 10 MG: 5 TABLET ORAL at 18:51

## 2024-04-11 RX ADMIN — PROPOFOL 25 MG: 10 INJECTION, EMULSION INTRAVENOUS at 14:34

## 2024-04-11 RX ADMIN — ROCURONIUM BROMIDE 30 MG: 50 INJECTION, SOLUTION INTRAVENOUS at 13:44

## 2024-04-11 RX ADMIN — ONDANSETRON 4 MG: 2 INJECTION INTRAMUSCULAR; INTRAVENOUS at 13:00

## 2024-04-11 RX ADMIN — HYDROMORPHONE HYDROCHLORIDE 1 MG: 1 INJECTION, SOLUTION INTRAMUSCULAR; INTRAVENOUS; SUBCUTANEOUS at 15:10

## 2024-04-11 RX ADMIN — Medication 1500 MG: at 12:47

## 2024-04-11 RX ADMIN — MAGNESIUM SULFATE HEPTAHYDRATE 2000 MG: 40 INJECTION, SOLUTION INTRAVENOUS at 13:05

## 2024-04-11 RX ADMIN — Medication 10 ML: at 21:24

## 2024-04-11 RX ADMIN — DEXAMETHASONE SODIUM PHOSPHATE 10 MG: 4 INJECTION, SOLUTION INTRAMUSCULAR; INTRAVENOUS at 13:00

## 2024-04-11 RX ADMIN — PROPOFOL 200 MG: 10 INJECTION, EMULSION INTRAVENOUS at 12:52

## 2024-04-11 RX ADMIN — KETOROLAC TROMETHAMINE 30 MG: 30 INJECTION, SOLUTION INTRAMUSCULAR at 15:33

## 2024-04-11 RX ADMIN — ACETAMINOPHEN 1000 MG: 500 TABLET ORAL at 11:36

## 2024-04-11 RX ADMIN — MELOXICAM 15 MG: 7.5 TABLET ORAL at 11:36

## 2024-04-11 RX ADMIN — SODIUM CHLORIDE, SODIUM LACTATE, POTASSIUM CHLORIDE, AND CALCIUM CHLORIDE: .6; .31; .03; .02 INJECTION, SOLUTION INTRAVENOUS at 14:02

## 2024-04-11 RX ADMIN — ACETAMINOPHEN 650 MG: 325 TABLET ORAL at 17:55

## 2024-04-11 RX ADMIN — SODIUM CHLORIDE, SODIUM LACTATE, POTASSIUM CHLORIDE, AND CALCIUM CHLORIDE: .6; .31; .03; .02 INJECTION, SOLUTION INTRAVENOUS at 12:44

## 2024-04-11 RX ADMIN — SUGAMMADEX 200 MG: 100 INJECTION, SOLUTION INTRAVENOUS at 14:40

## 2024-04-11 RX ADMIN — HYDROMORPHONE HYDROCHLORIDE 1 MG: 1 INJECTION, SOLUTION INTRAMUSCULAR; INTRAVENOUS; SUBCUTANEOUS at 15:22

## 2024-04-11 RX ADMIN — MAGNESIUM SULFATE HEPTAHYDRATE 2000 MG: 40 INJECTION, SOLUTION INTRAVENOUS at 11:37

## 2024-04-11 RX ADMIN — HYDROMORPHONE HYDROCHLORIDE 1 MG: 1 INJECTION, SOLUTION INTRAMUSCULAR; INTRAVENOUS; SUBCUTANEOUS at 14:56

## 2024-04-11 RX ADMIN — ROCURONIUM BROMIDE 50 MG: 50 INJECTION, SOLUTION INTRAVENOUS at 12:52

## 2024-04-11 RX ADMIN — OXYCODONE 10 MG: 5 TABLET ORAL at 22:47

## 2024-04-11 RX ADMIN — HYDROMORPHONE HYDROCHLORIDE 1 MG: 2 INJECTION, SOLUTION INTRAMUSCULAR; INTRAVENOUS; SUBCUTANEOUS at 12:52

## 2024-04-11 RX ADMIN — OXYCODONE 10 MG: 5 TABLET ORAL at 15:22

## 2024-04-11 RX ADMIN — DEXMEDETOMIDINE HYDROCHLORIDE 8 MCG: 100 INJECTION, SOLUTION INTRAVENOUS at 13:23

## 2024-04-11 RX ADMIN — DEXMEDETOMIDINE HYDROCHLORIDE 4 MCG: 100 INJECTION, SOLUTION INTRAVENOUS at 14:12

## 2024-04-11 RX ADMIN — PROPOFOL 25 MG: 10 INJECTION, EMULSION INTRAVENOUS at 14:25

## 2024-04-11 RX ADMIN — ROCURONIUM BROMIDE 30 MG: 50 INJECTION, SOLUTION INTRAVENOUS at 13:20

## 2024-04-11 RX ADMIN — MIDAZOLAM 2 MG: 1 INJECTION INTRAMUSCULAR; INTRAVENOUS at 12:42

## 2024-04-11 RX ADMIN — TRAMADOL HYDROCHLORIDE 100 MG: 50 TABLET ORAL at 11:37

## 2024-04-11 ASSESSMENT — PAIN SCALES - GENERAL
PAINLEVEL_OUTOF10: 0
PAINLEVEL_OUTOF10: 7
PAINLEVEL_OUTOF10: 10
PAINLEVEL_OUTOF10: 7
PAINLEVEL_OUTOF10: 9
PAINLEVEL_OUTOF10: 7
PAINLEVEL_OUTOF10: 9
PAINLEVEL_OUTOF10: 9
PAINLEVEL_OUTOF10: 10
PAINLEVEL_OUTOF10: 6
PAINLEVEL_OUTOF10: 7
PAINLEVEL_OUTOF10: 7
PAINLEVEL_OUTOF10: 9

## 2024-04-11 ASSESSMENT — PAIN DESCRIPTION - LOCATION
LOCATION: HIP

## 2024-04-11 ASSESSMENT — PAIN DESCRIPTION - DESCRIPTORS: DESCRIPTORS: ACHING

## 2024-04-11 ASSESSMENT — PAIN DESCRIPTION - ORIENTATION
ORIENTATION: RIGHT

## 2024-04-11 NOTE — ANESTHESIA POSTPROCEDURE EVALUATION
Department of Anesthesiology  Postprocedure Note    Patient: Noam Arita Jr.  MRN: [id number]  YOB: 1985  Date of evaluation: 4/11/2024    Procedure Summary       Date: 04/11/24 Room / Location: [address]    Anesthesia Start: 1244 Anesthesia Stop: 1453    Procedure: RIGHT TOTAL HIP ARTHROPLASTY, POSTERIOR (Right: Hip) Diagnosis:       Osteoarthritis resulting from right hip dysplasia      Legg-Perthes disease, right      (Osteoarthritis resulting from right hip dysplasia [M16.31])      (Legg-Perthes disease, right [M91.11])    Surgeons: Michael Macraio MD Responsible Provider: Rony Smith MD    Anesthesia Type: general ASA Status: 2            Anesthesia Type: No value filed.    Jayla Phase I: Jayla Score: 9    Jayla Phase II:      Anesthesia Post Evaluation    Patient location during evaluation: PACU  Patient participation: complete - patient participated  Level of consciousness: awake and alert  Airway patency: patent  Nausea & Vomiting: no nausea and no vomiting  Cardiovascular status: hemodynamically stable  Respiratory status: acceptable  Hydration status: euvolemic  Pain management: adequate    No notable events documented.

## 2024-04-11 NOTE — ANESTHESIA PRE PROCEDURE
Snuff   Substance Use Topics   • Alcohol use: Yes     Alcohol/week: 10.0 standard drinks of alcohol     Types: 10 Cans of beer per week                                Counseling given: Not Answered      Vital Signs (Current):   Vitals:    04/11/24 1116 04/11/24 1118   BP: (!) 140/76    Pulse: 70    Resp: 18    Temp: 98.6 °F (37 °C)    TempSrc: Oral    SpO2:  98%                                              BP Readings from Last 3 Encounters:   04/11/24 (!) 140/76   03/15/24 138/80   12/27/23 126/70       NPO Status:                                                                                 BMI:   Wt Readings from Last 3 Encounters:   03/15/24 98.9 kg (218 lb)   03/07/24 94.8 kg (209 lb)   12/27/23 97.5 kg (215 lb)     There is no height or weight on file to calculate BMI.    CBC:   Lab Results   Component Value Date/Time    WBC 9.7 03/22/2024 07:55 AM    RBC 5.59 03/22/2024 07:55 AM    HGB 18.5 03/22/2024 07:55 AM    HCT 53.9 03/22/2024 07:55 AM    MCV 96.4 03/22/2024 07:55 AM    RDW 14.2 03/22/2024 07:55 AM     03/22/2024 07:55 AM       CMP:   Lab Results   Component Value Date/Time     03/22/2024 07:55 AM    K 4.9 03/22/2024 07:55 AM    K 3.9 01/16/2022 11:20 PM     03/22/2024 07:55 AM    CO2 24 03/22/2024 07:55 AM    BUN 13 03/22/2024 07:55 AM    CREATININE 0.8 03/22/2024 07:55 AM    GFRAA >60 08/26/2022 03:04 PM    AGRATIO 1.9 08/26/2022 03:04 PM    LABGLOM >60 03/22/2024 07:55 AM    GLUCOSE 87 03/22/2024 07:55 AM    PROT 7.0 05/16/2023 11:26 AM    CALCIUM 9.3 03/22/2024 07:55 AM    BILITOT 0.3 08/26/2022 03:04 PM    ALKPHOS 63 08/26/2022 03:04 PM    AST 17 08/26/2022 03:04 PM    ALT 36 08/26/2022 03:04 PM       POC Tests: No results for input(s): \"POCGLU\", \"POCNA\", \"POCK\", \"POCCL\", \"POCBUN\", \"POCHEMO\", \"POCHCT\" in the last 72 hours.    Coags:   Lab Results   Component Value Date/Time    PROTIME 13.4 03/22/2024 07:55 AM    INR 1.02 03/22/2024 07:55 AM    APTT 29.8 03/22/2024 07:55 AM

## 2024-04-11 NOTE — DISCHARGE INSTRUCTIONS
Total Hip & Bipolar Replacement  Discharge Instructions    To prevent Clot formation, you have been placed on the following medication:  Take aspirin 81 mg twice a day starting day after surgery   Surgical Site Care:  Keep incision clean and dry.  May shower on post-op day #3 with waterproof dressing on.  Change to new waterproof dressing between 5-7 days.   Physical Therapy:  Weight Bearing Status:     Weight bearing as tolerated  Precautions  Per Physical Therapy handout  No flexion greater than 90 degrees, no hip adduction past midline, no internal rotation past neutral.  No sitting in low chairs or toilets that would allow hip to be below the knee.   Pain Medications  You were given oxycodone (Oxycontin, Oxyir)  Wean off pain medications as you deem appropriate as long as pain is under control  Be sure to drink plenty of fluids (recommend water) while taking narcotic pain medications to prevent constipation  You may take an over the counter laxative or stool softener as needed to prevent/treat constipation as well, we recommend Senokot S OTC.  We recommend that you consider taking these medications the entire time you are taking pain medication.  Cold packs/Ice packs/Machine  May be used as much as necessary to reduce swelling/inflammation/soreness  Be sure to have a barrier (cloth, clothing, towel) between your skin/incision and the ice pack to prevent frostbite  Contact office if  Increased redness, swelling, drainage of any kind, and/or pain to surgery site.  As well as new onset fevers and or chills.  These could signify an infection.  Calf or thigh tenderness to touch as well as increased swelling or redness.  This could signify a clot formation.  Numbness or tingling to an area around the incision site or below the incision site (toes).  Any rash appears, increased  or new onset nausea/vomiting occur.  This may indicate a reaction to a medication.   Phone # [phone]  Follow up with Dr. Macario or Jacob  Fair

## 2024-04-11 NOTE — OP NOTE
Orthopaedic Surgery  Operative Report      Patient Name:  Noam Arita Jr.  Patient :  1985  MRN: [id number]    Date: 24     Pre-operative Diagnosis:   M16.31 Right hip arthritis secondary to dysplasia  M91.11 Legg calf Perthes disease    Post-operative Diagnosis:    Same    Procedure: RIGHT  [address]3725 Total Hip Arthroplasty, posterior  Modifier 22    Surgeon:  Surgeon(s) and Role:     * Michael Macario MD - Primary    Assistant: Circulator: Karlee Sutton RN  Surgical Assistant: Len Hester  Scrub Person First: Beverley Jolley  Scrub Person Second: Rosanna Manuel    Anesthesia: General endotracheal anesthesia and Intraoperative local infiltration - Exparel/marcaine solution    Estimated blood loss: 150    Specimens: * No specimens in log *    Complications: None    Drains: None    Condition: Stable    Implants:   Implant Name Type Inv. Item Serial No.  Lot No. LRB No. Used Action   G7 OSSEOTI 4 HOLE SHELL 58MM G - [id number]  G7 OSSEOTI 4 HOLE SHELL 58MM G  [employer]- [id number] Right 1 Implanted   LINER ACET NEUT G 40 MM VIVACIT-E G7 - [id number]  LINER ACET NEUT G 40 MM VIVACIT-E G7  [employer]- [id number] Right 1 Implanted   BONE SCREW 6.5X35 SELF-TAP - [id number]  BONE SCREW 6.5X35 SELF-TAP  [employer]- [id number] Right 1 Implanted   TPRLC 133 FP TYPE1 PPS SO 6.0 - [id number]  TPRLC 133 FP TYPE1 PPS SO 6.0  [employer]- [id number] Right 1 Implanted   SLEEVE FEM -3MM OFFSET HIP TYP 1 TAPR FOR CERAMIC BIOLOX - [id number]  SLEEVE FEM -3MM OFFSET HIP TYP 1 TAPR FOR CERAMIC BIOLOX  [employer]- [id number] Right 1 Implanted   HEAD FEM [id number] HIP BIOLOX DELT OPT FOR G7 ACET SYS - [id number]  HEAD FEM [id number] HIP BIOLOX DELT OPT FOR G7 ACET SYS  [employer]- [id number] Right 1 Implanted       Findings:   1. End stage OA  2.  Preop length inequality, 2 cm shorter on right than left    Indications:   The

## 2024-04-11 NOTE — H&P
Update History & Physical     The patient's History and Physical of 3/25/2024 was reviewed with the patient and I examined the patient. There was no change. The surgical site was confirmed by the patient and me.      Plan: The risks, benefits, expected outcome, and alternative to the recommended procedure have been discussed with the patient / family. Patient understands and wants to proceed with the procedure.      Electronically signed by Michael Macario MD on 4/11/2024 at 12:18 PM

## 2024-04-12 VITALS
DIASTOLIC BLOOD PRESSURE: 70 MMHG | RESPIRATION RATE: 18 BRPM | SYSTOLIC BLOOD PRESSURE: 137 MMHG | BODY MASS INDEX: 33.27 KG/M2 | TEMPERATURE: 98 F | WEIGHT: 212 LBS | HEIGHT: 67 IN | HEART RATE: 74 BPM | OXYGEN SATURATION: 94 %

## 2024-04-12 LAB
ANION GAP SERPL CALCULATED.3IONS-SCNC: 12 MMOL/L (ref 3–16)
BASOPHILS # BLD: 0 K/UL (ref 0–0.2)
BASOPHILS NFR BLD: 0.1 %
BUN SERPL-MCNC: 7 MG/DL (ref 7–20)
CALCIUM SERPL-MCNC: 8.2 MG/DL (ref 8.3–10.6)
CHLORIDE SERPL-SCNC: 98 MMOL/L (ref 99–110)
CO2 SERPL-SCNC: 24 MMOL/L (ref 21–32)
CREAT SERPL-MCNC: 0.7 MG/DL (ref 0.9–1.3)
DEPRECATED RDW RBC AUTO: 14.1 % (ref 12.4–15.4)
EOSINOPHIL # BLD: 0 K/UL (ref 0–0.6)
EOSINOPHIL NFR BLD: 0.1 %
GFR SERPLBLD CREATININE-BSD FMLA CKD-EPI: >90 ML/MIN/{1.73_M2}
GLUCOSE BLD-MCNC: 77 MG/DL (ref 70–99)
GLUCOSE BLD-MCNC: 89 MG/DL (ref 70–99)
GLUCOSE SERPL-MCNC: 105 MG/DL (ref 70–99)
HCT VFR BLD AUTO: 48.3 % (ref 40.5–52.5)
HGB BLD-MCNC: 16 G/DL (ref 13.5–17.5)
LYMPHOCYTES # BLD: 1.7 K/UL (ref 1–5.1)
LYMPHOCYTES NFR BLD: 8.6 %
MCH RBC QN AUTO: 31.7 PG (ref 26–34)
MCHC RBC AUTO-ENTMCNC: 33.1 G/DL (ref 31–36)
MCV RBC AUTO: 95.9 FL (ref 80–100)
MONOCYTES # BLD: 1 K/UL (ref 0–1.3)
MONOCYTES NFR BLD: 5 %
NEUTROPHILS # BLD: 16.9 K/UL (ref 1.7–7.7)
NEUTROPHILS NFR BLD: 86.2 %
PERFORMED ON: NORMAL
PERFORMED ON: NORMAL
PLATELET # BLD AUTO: 305 K/UL (ref 135–450)
PMV BLD AUTO: 7.8 FL (ref 5–10.5)
POTASSIUM SERPL-SCNC: 4.2 MMOL/L (ref 3.5–5.1)
RBC # BLD AUTO: 5.04 M/UL (ref 4.2–5.9)
SODIUM SERPL-SCNC: 134 MMOL/L (ref 136–145)
WBC # BLD AUTO: 19.6 K/UL (ref 4–11)

## 2024-04-12 PROCEDURE — APPNB45 APP NON BILLABLE 31-45 MINUTES: Performed by: SPECIALIST/TECHNOLOGIST

## 2024-04-12 PROCEDURE — 97530 THERAPEUTIC ACTIVITIES: CPT

## 2024-04-12 PROCEDURE — 36415 COLL VENOUS BLD VENIPUNCTURE: CPT

## 2024-04-12 PROCEDURE — 6360000002 HC RX W HCPCS: Performed by: NURSE PRACTITIONER

## 2024-04-12 PROCEDURE — 97535 SELF CARE MNGMENT TRAINING: CPT

## 2024-04-12 PROCEDURE — 97110 THERAPEUTIC EXERCISES: CPT

## 2024-04-12 PROCEDURE — 6370000000 HC RX 637 (ALT 250 FOR IP): Performed by: PHYSICIAN ASSISTANT

## 2024-04-12 PROCEDURE — 6370000000 HC RX 637 (ALT 250 FOR IP): Performed by: SPECIALIST/TECHNOLOGIST

## 2024-04-12 PROCEDURE — 85025 COMPLETE CBC W/AUTO DIFF WBC: CPT

## 2024-04-12 PROCEDURE — 97116 GAIT TRAINING THERAPY: CPT

## 2024-04-12 PROCEDURE — 2580000003 HC RX 258: Performed by: PHYSICIAN ASSISTANT

## 2024-04-12 PROCEDURE — 99024 POSTOP FOLLOW-UP VISIT: CPT | Performed by: SPECIALIST/TECHNOLOGIST

## 2024-04-12 PROCEDURE — 6360000002 HC RX W HCPCS: Performed by: PHYSICIAN ASSISTANT

## 2024-04-12 PROCEDURE — 80048 BASIC METABOLIC PNL TOTAL CA: CPT

## 2024-04-12 RX ORDER — LISINOPRIL 10 MG/1
10 TABLET ORAL DAILY
Qty: 90 TABLET | Refills: 1 | Status: SHIPPED | OUTPATIENT
Start: 2024-04-12

## 2024-04-12 RX ORDER — CYCLOBENZAPRINE HCL 10 MG
10 TABLET ORAL 3 TIMES DAILY PRN
Qty: 30 TABLET | Refills: 0 | Status: SHIPPED | OUTPATIENT
Start: 2024-04-12 | End: 2024-04-22

## 2024-04-12 RX ORDER — MELOXICAM 15 MG/1
15 TABLET ORAL DAILY
Qty: 30 TABLET | Refills: 0 | Status: SHIPPED | OUTPATIENT
Start: 2024-04-12 | End: 2024-05-12

## 2024-04-12 RX ORDER — OXYCODONE HYDROCHLORIDE 5 MG/1
5 TABLET ORAL EVERY 6 HOURS PRN
Qty: 28 TABLET | Refills: 0 | Status: SHIPPED | OUTPATIENT
Start: 2024-04-12 | End: 2024-04-19

## 2024-04-12 RX ORDER — ASPIRIN 81 MG/1
81 TABLET, CHEWABLE ORAL 2 TIMES DAILY
Qty: 60 TABLET | Refills: 0 | Status: SHIPPED | OUTPATIENT
Start: 2024-04-12 | End: 2024-05-12

## 2024-04-12 RX ORDER — KETOROLAC TROMETHAMINE 30 MG/ML
15 INJECTION, SOLUTION INTRAMUSCULAR; INTRAVENOUS EVERY 6 HOURS PRN
Status: DISCONTINUED | OUTPATIENT
Start: 2024-04-12 | End: 2024-04-12 | Stop reason: HOSPADM

## 2024-04-12 RX ORDER — METHOCARBAMOL 500 MG/1
500 TABLET, FILM COATED ORAL 4 TIMES DAILY
Status: DISCONTINUED | OUTPATIENT
Start: 2024-04-12 | End: 2024-04-12 | Stop reason: HOSPADM

## 2024-04-12 RX ORDER — METHYLPREDNISOLONE 4 MG/1
TABLET ORAL
Qty: 1 KIT | Refills: 0 | Status: SHIPPED | OUTPATIENT
Start: 2024-04-12

## 2024-04-12 RX ADMIN — ACETAMINOPHEN 650 MG: 325 TABLET ORAL at 06:06

## 2024-04-12 RX ADMIN — ONDANSETRON 4 MG: 2 INJECTION INTRAMUSCULAR; INTRAVENOUS at 00:47

## 2024-04-12 RX ADMIN — MELOXICAM 7.5 MG: 7.5 TABLET ORAL at 08:29

## 2024-04-12 RX ADMIN — VANCOMYCIN HYDROCHLORIDE 1500 MG: 10 INJECTION, POWDER, LYOPHILIZED, FOR SOLUTION INTRAVENOUS at 00:36

## 2024-04-12 RX ADMIN — ACETAMINOPHEN 650 MG: 325 TABLET ORAL at 13:24

## 2024-04-12 RX ADMIN — OXYCODONE 10 MG: 5 TABLET ORAL at 07:32

## 2024-04-12 RX ADMIN — MORPHINE SULFATE 4 MG: 4 INJECTION, SOLUTION INTRAMUSCULAR; INTRAVENOUS at 00:47

## 2024-04-12 RX ADMIN — Medication 10 ML: at 08:29

## 2024-04-12 RX ADMIN — OXYCODONE 10 MG: 5 TABLET ORAL at 03:27

## 2024-04-12 RX ADMIN — OXYCODONE 10 MG: 5 TABLET ORAL at 11:32

## 2024-04-12 RX ADMIN — ACETAMINOPHEN 650 MG: 325 TABLET ORAL at 00:37

## 2024-04-12 RX ADMIN — METHOCARBAMOL 500 MG: 500 TABLET ORAL at 13:25

## 2024-04-12 ASSESSMENT — PAIN DESCRIPTION - DESCRIPTORS: DESCRIPTORS: ACHING;PRESSURE

## 2024-04-12 ASSESSMENT — PAIN DESCRIPTION - LOCATION: LOCATION: HIP;LEG

## 2024-04-12 ASSESSMENT — PAIN - FUNCTIONAL ASSESSMENT: PAIN_FUNCTIONAL_ASSESSMENT: PREVENTS OR INTERFERES SOME ACTIVE ACTIVITIES AND ADLS

## 2024-04-12 ASSESSMENT — PAIN SCALES - GENERAL
PAINLEVEL_OUTOF10: 7
PAINLEVEL_OUTOF10: 7
PAINLEVEL_OUTOF10: 6
PAINLEVEL_OUTOF10: 7
PAINLEVEL_OUTOF10: 7

## 2024-04-12 ASSESSMENT — PAIN DESCRIPTION - ORIENTATION: ORIENTATION: RIGHT

## 2024-04-12 NOTE — DISCHARGE SUMMARY
Department of Orthopedic Surgery  Physician Assistant   Discharge Summary      The Noam Arita Jr. is a 38 y.o. male underwent total hip replacement procedure without complication.  Noam Arita Jr. was admitted to the floor following their recovery in the PACU.     Discharge Diagnosis  right Total Hip Arthroplasty    Current Inpatient Medications    Current Facility-Administered Medications: ketorolac (TORADOL) injection 15 mg, 15 mg, IntraVENous, Q6H PRN  methocarbamol (ROBAXIN) tablet 500 mg, 500 mg, Oral, 4x Daily  insulin NPH (HumuLIN N;NovoLIN N) injection vial 12 Units, 0.125 Units/kg (Order-Specific), SubCUTAneous, BID AC  insulin lispro (HUMALOG) injection vial 8 Units, 0.08 Units/kg (Order-Specific), SubCUTAneous, TID WC  insulin lispro (HUMALOG) injection vial 0-8 Units, 0-8 Units, SubCUTAneous, TID WC  insulin lispro (HUMALOG) injection vial 0-4 Units, 0-4 Units, SubCUTAneous, Nightly  glucose chewable tablet 16 g, 4 tablet, Oral, PRN  dextrose bolus 10% 125 mL, 125 mL, IntraVENous, PRN **OR** dextrose bolus 10% 250 mL, 250 mL, IntraVENous, PRN  glucagon injection 1 mg, 1 mg, SubCUTAneous, PRN  dextrose 10 % infusion, , IntraVENous, Continuous PRN  lactated ringers IV soln infusion, , IntraVENous, Continuous  sodium chloride flush 0.9 % injection 5-40 mL, 5-40 mL, IntraVENous, 2 times per day  sodium chloride flush 0.9 % injection 5-40 mL, 5-40 mL, IntraVENous, PRN  0.9 % sodium chloride infusion, , IntraVENous, PRN  acetaminophen (TYLENOL) tablet 650 mg, 650 mg, Oral, Q6H  oxyCODONE (ROXICODONE) immediate release tablet 5 mg, 5 mg, Oral, Q4H PRN **OR** oxyCODONE (ROXICODONE) immediate release tablet 10 mg, 10 mg, Oral, Q4H PRN  polyethylene glycol (GLYCOLAX) packet 17 g, 17 g, Oral, Daily  senna (SENOKOT) tablet 8.6 mg, 1 tablet, Oral, BID PRN  ondansetron (ZOFRAN-ODT) disintegrating tablet 4 mg, 4 mg, Oral, Q8H PRN **OR** ondansetron (ZOFRAN) injection 4 mg, 4 mg, IntraVENous, Q6H

## 2024-04-12 NOTE — DISCHARGE INSTR - COC
completed shifts:  In: 1200 [I.V.:1000; Blood:200]  Out: 2725 [Urine:2325; Blood:400]    Safety Concerns:     { RIK Safety Concerns:056971340}    Impairments/Disabilities:      { RIK Impairments/Disabilities:291666352}    Nutrition Therapy:  Current Nutrition Therapy:   { RIK Diet List:882837626}    Routes of Feeding: {[facility] DME Other Feedings:841082884}  Liquids: {Slp liquid thickness:03640}  Daily Fluid Restriction: {[facility] DME Yes amt example:416577606}  Last Modified Barium Swallow with Video (Video Swallowing Test): {Done Not Done Date:}    Treatments at the Time of Hospital Discharge:   Respiratory Treatments: ***  Oxygen Therapy:  {Therapy; copd oxygen:79853}  Ventilator:    { CC Vent List:323420290}    Rehab Therapies: {THERAPEUTIC INTERVENTION:8418546217}  Weight Bearing Status/Restrictions: {[facility] Weight Bearin}  Other Medical Equipment (for information only, NOT a DME order):  {EQUIPMENT:761664092}  Other Treatments: ***    Patient's personal belongings (please select all that are sent with patient):  {[facility] DME Belongings:458944201}    RN SIGNATURE:  {Esignature:653118234}    CASE MANAGEMENT/SOCIAL WORK SECTION    Inpatient Status Date: ***    Readmission Risk Assessment Score:  Readmission Risk              Risk of Unplanned Readmission:  0           Discharging to Facility/ Agency   Name:   Address:  Phone:  Fax:    Dialysis Facility (if applicable)   Name:  Address:  Dialysis Schedule:  Phone:  Fax:    / signature: {Esignature:045349859}    PHYSICIAN SECTION    Prognosis: {Prognosis:5799195588}    Condition at Discharge: { Patient Condition:094620093}    Rehab Potential (if transferring to Rehab): {Prognosis:7986900908}    Recommended Labs or Other Treatments After Discharge: ***    Physician Certification: I certify the above information and transfer of Noam Arita Jr.  is necessary for the continuing treatment of the diagnosis listed and that

## 2024-04-12 NOTE — CONSULTS
Consult Placed- hospitalist    Who: Corrie served    Date:4/11/24  Time:18:27     Electronically signed by Lillie Kiser on 4/11/2024 at 6:23 PM    
15 MG tablet Take 1 tablet by mouth daily Start after medrol completed 4/11/24  Yes Jacob Mclaughlin PA-C   ondansetron (ZOFRAN) 4 MG tablet Take 1 tablet by mouth 3 times daily as needed for Nausea or Vomiting 4/11/24  Yes Jacob Mclaughlin PA-C   oxyCODONE (ROXICODONE) 5 MG immediate release tablet Take 1 tablet by mouth every 6 hours as needed for Pain for up to 7 days. Intended supply: 7 days. Take lowest dose possible to manage pain Max Daily Amount: 20 mg 4/11/24 4/18/24 Yes Jacob Mclaughlin PA-C   sulfamethoxazole-trimethoprim (BACTRIM DS;SEPTRA DS) 800-160 MG per tablet Take 1 tablet by mouth 2 times daily for 1 day 4/11/24 4/12/24 Yes Jacob Mclaughlin PA-C   MILK THISTLE PO Take 1 tablet by mouth daily    Provider, MD Wesley   lisinopril (PRINIVIL;ZESTRIL) 10 MG tablet TAKE 1 TABLET BY MOUTH EVERY DAY  Patient taking differently: Take 1 tablet by mouth daily 11/1/23   Karlee Diaz PA   famotidine (PEPCID) 10 MG tablet Take 1 tablet by mouth daily as needed (gerd)    Provider, MD Wesley       Labs: Personally reviewed and interpreted for clinical significance.   No results for input(s): \"WBC\", \"HGB\", \"HCT\", \"PLT\" in the last 72 hours.  No results for input(s): \"NA\", \"K\", \"CL\", \"CO2\", \"BUN\", \"CREATININE\", \"CALCIUM\", \"MG\", \"PHOS\" in the last 72 hours.  No results for input(s): \"PROBNP\", \"TROPHS\" in the last 72 hours.  No results for input(s): \"LABA1C\" in the last 72 hours.  No results for input(s): \"AST\", \"ALT\", \"BILIDIR\", \"BILITOT\", \"ALKPHOS\" in the last 72 hours.  No results for input(s): \"INR\", \"LACTA\", \"TSH\" in the last 72 hours.      Rosales Mcgraw MD MD

## 2024-04-12 NOTE — PROGRESS NOTES
Surgery Date and Time: 4/11/24 @ 01:30 pm   Arrival Time:  11:30 am    The instructions given when and if a patient needs to stop oral intake prior to surgery varies. Follow the instructions you were given by your    Surgeon or RN during the Pre-op call.       __X__Nothing to eat or to drink after Midnight the night before the surgery. NO gum, mints, candy or ice chips day of surgery.                   __X__ Carbo-loading or ENHANCED RECOVERY instructions will be given to select patients.  Please do the following:     The evening before your surgery after dinner before midnight drink 40 ounces (2 - 20 ounce bottles) of Gatorade. If you are diabetic use sugar free.      The morning of surgery drink two (2) - 20 ounce bottle water. This needs to be finished 2 hours prior to your surgery start time.                   Only take the following medications with a small sip of water the morning of surgery:  none                 Hold the following medications (per anesthesia or surgeon request):  lisinopril        Last Dose: 4/10/24 am      Aspirin, Ibuprofen, Advil, Naproxen, Vitamin E and other Anti-inflammatory products and supplements should be stopped for 5 -7days before surgery      or as directed by your physician.      - Do not smoke or vape, and do not drink any alcoholic beverages 24 hours prior to surgery, this includes NA Beer. Refrain from using any recreational drugs,     including non-prescribed prescription drugs.     -You may brush your teeth and gargle the morning of surgery.  DO NOT SWALLOW WATER.    -You MUST plan for a responsible adult to stay on site while you are here and take you home after your surgery. You will not be allowed to leave alone or drive               yourself home. It is requested someone stay with you the first 24 hrs. Your surgery will be cancelled if you do not have a ride home with a responsible adult.    -A parent/legal guardian must accompany a child scheduled for surgery and 
Bedside report given to Karlee VÁZQUEZ   
D: Patient here for surgery, taken to [facility] room 3, patient voided and was able to undress self. A: Pre op checklist completed and documented, assessment completed and documented, all pre op medications and pre procedures completed, instructed on use of call light and spouse is at stretcher.  
Occupational Therapy  Facility/Department: [facility] OR  Occupational Therapy Initial Assessment & Treatment    Name: Noam Arita Jr.  : 1985  MRN: [id number]  Date of Service: 2024    Discharge Recommendations:  24 hour supervision or assist  OT Equipment Recommendations  Equipment Needed: Yes  Mobility Devices: ADL Assistive Devices  ADL Assistive Devices: Toileting - Raised Toilet Seat with arms    AM-PAC score  AM-PAC Inpatient Daily Activity Raw Score: 15 (24)  AM-PAC Inpatient ADL T-Scale Score : 34.69 (24)  ADL Inpatient CMS 0-100% Score: 56.46 (24)  ADL Inpatient CMS G-Code Modifier : CK (24)    If pt is unable to be seen after this session, please let this note serve as discharge summary.  Please see case management note for discharge disposition.  Thank you.     Patient Diagnosis(es): The encounter diagnosis was Osteoarthritis resulting from right hip dysplasia.  Past Medical History:  has a past medical history of Arthritis, Avascular necrosis of hip (HCC), H/O degenerative disc disease, Hypertension, Lumbosacral spondylosis without myelopathy, Mild intermittent asthma without complication, Osteoarthritis, and Perthes disease.  Past Surgical History:  has a past surgical history that includes hip surgery (Right, ); Total hip arthroplasty (Left, 2022); and Trosper tooth extraction.    Assessment   Performance deficits / Impairments: Decreased functional mobility ;Decreased ADL status;Decreased strength;Decreased safe awareness;Decreased balance;Decreased ROM;Decreased endurance    Assessment: Pt referred for a comprehensive OT evaluation d/t diagnosis of R hip replacement, posterior approach with deficits impacting pt's functional baseline. Pt was received for OT eval/treat resting in PACU bed with wife at bedside and is agreeable to treatment session. Reports 2/10 pain in RLE. Prior to admit, pt reports living with wife in 1 story 
Physical Therapy  Facility/Department: [facility] C5 - MED SURG/ORTHO  Daily Treatment Note  NAME: Noam Arita Jr.  : 1985  MRN: [id number]    Date of Service: 2024    Discharge Recommendations:  24 hour supervision or assist, Outpatient PT        Patient Diagnosis(es): The encounter diagnosis was Osteoarthritis resulting from right hip dysplasia.    Assessment   Assessment: Pt demos SUP for bed mobility, SB(A) for transfers and for ambulation. Pt had 2 mild instances of R knee instability w/o LOB. Pt demos proper use and sequencing with RW and demonstrates understanding 3/3 hip precautions. Pt would benefit from continued skilled PT to address current deficits. Recommend home with 24hr supervision and OPPT once medically stable.  Activity Tolerance: Patient tolerated treatment well     Plan    Physical Therapy Plan  General Plan: 2 times a day 7 days a week  Current Treatment Recommendations: Strengthening;Balance training;Transfer training;Functional mobility training;Endurance training;Pain management;Neuromuscular re-education;Safety education & training;Home exercise program;Gait training;Patient/Caregiver education & training;Therapeutic activities     Restrictions  Restrictions/Precautions  Restrictions/Precautions: Surgical Protocols, ROM Restrictions, General Precautions  Position Activity Restriction  Hip Precautions: No hip flexion > 90 degrees, No ADduction, No hip internal rotation, Posterior hip precautions     Subjective    Subjective  Subjective: Pt agrees to PT session  Pain: 7/10 R hip  Orientation  Overall Orientation Status: Within Normal Limits     Objective   Vitals  Pulse: 84  BP: 126/76  BP Location: Left upper arm  MAP (Calculated): 93  SpO2: 97 %  O2 Device: None (Room air)  Bed Mobility Training  Bed Mobility Training: Yes  Interventions: Verbal cues;Safety awareness training  Supine to Sit: Supervision;Additional time  Scooting: Supervision  Balance  Sitting: 
Pt brought to PACU. Report obtained from OR RN and anesthesia. Pt placed on monitor and O2 at  3L   
Pt discharged from facility. Medication received from outpatient pharmacy. IV removed w/o complication. Education provided. Questions answered.    Nichelle Cabello RN    
place  Restraints  Restraints Initially in Place: No     Patient Education  Education Given To: Patient;Family  Education Provided: Role of Therapy;Transfer Training;Equipment;Plan of Care;Fall Prevention Strategies;ADL Adaptive Strategies;Family Education;Precautions  Education Provided Comments: post hip precautions, car transfer/toilet transfer, raised-toilet seat for post hip precaution adherance.  Education Method: Demonstration;Verbal  Barriers to Learning: None  Education Outcome: Verbalized understanding;Continued education needed  Disease Specific Education: Pt educated on weight bearing status, post-op precautions, appropriate DME, and safe mobility with AD. Pt verbalized understanding    Patient Educated in safety with car transfers and  dispensed instruction on car transfers with use of assistive device with patient demonstrating:  [x] Verbalizing understanding of appropriate technique, maintaining any ordered precautions for car transfer training s/p TJR  [] Merrick with simulated car transfer with walker  [] Other: Educated patient in written car transfer instruction with patient verbalizing understanding of appropriate techniques.    Goals  Short Term Goals  Time Frame for Short Term Goals: 1 week (4/18/2024): unless otherwise noted  Short Term Goal 1: Pt will complete toilet transfer w/ LRD and SPV by 4/16/2024. - GOAL MET 4/12  Short Term Goal 2: Pt will verbalize all posterior hip precautions with no cues for safety. - GOAL MET 4/12  Short Term Goal 3: Pt will complete LB dressing with increased time and setup. - ongoing, pt was educated on reacher and sock aid 4/12  Short Term Goal 4: Pt will complete grooming tasks at sink for ~5mins for increased endurance. - ongoing, pt declined need for grooming  Patient Goals   Patient goals : \"go home\"    AM-PAC - ADL  AM-PAC Daily Activity - Inpatient   How much help is needed for putting on and taking off regular lower body clothing?: A Little  How 
required verbal cues for compensatory strategies and posterior hip precautions    Transfers: Pt performed transfers with CGA and Min Assist with use of Rolling Walker. Pt required verbal cues for compensatory strategies. Educated on posterior hip precautions in sitting and hand placement, multiple reps performed from EOB    Gait Training: Pt able to ambulate 40 ft with CGA and Min Assist with use of Rolling Walker. Pt required verbal/Tactile cues for hand placement, verbal cues for compensatory strategies.Pt demo 3 instances of R knee buckling due to impaired sensation/proprioception, able to compensate with proper use of RW    Therapeutic Exercises:  Pt performed 10 reps of BLE exercises including: ankle pumps, quad sets, glute sets, heel slides, SAQ. Pt required verbal/Tactile cues for technique. Pt unable to complete SAQ without AAROM.  Pt provided written HEP.    Disease Specific Education:   Pt educated on weight bearing status, post-op precautions, appropriate DME, and safe mobility with AD. Pt verbalized understanding    Assessment: see above    Plan:   [x] Pt to be seen BID 7 days/week while in acute care setting for Therapeutic Exercises, Therapeutic Activities, Bed mobility, Transfer training, Progressive gait training, Balance training, and Patient and/or family education    PT Goals: to be met by 4/15  Pt will perform bed mobility with Supervision  Pt will perform transfers with Supervision and use of Rolling Walker  Pt will ambulate 100 ft with Supervision and use of Rolling Walker  Pt will perform 10 reps of BLE exercises without assist by 4/12  Pt will verbalize 3/3 Posterior hip precautions     Pt specific goal: \"to walk with walker and a little help\"    Dispo: At end of session, pt Discussed with RN and pt sitting EOB     Therapy Time:  Time In: 1545  Time Out: 1625    Total Treatment Minutes:  30 minutes (10 minute eval)      Electronically signed by Alexia Dee PT on 4/11/2024 at 4:56 PM   
-- 78 (!) 9 95 % -- --   04/11/24 1509 130/62 -- -- 77 22 96 % -- --   04/11/24 1508 -- -- -- 87 12 90 % -- --   04/11/24 1507 -- -- -- 68 16 (!) 88 % -- --   04/11/24 1506 -- -- -- 69 16 (!) 88 % -- --   04/11/24 1505 -- -- -- 70 14 (!) 86 % -- --   04/11/24 1504 -- -- -- 70 17 (!) 88 % -- --   04/11/24 1503 -- -- -- 69 14 93 % -- --   04/11/24 1502 -- -- -- 80 17 91 % -- --   04/11/24 1501 -- -- -- 68 14 93 % -- --   04/11/24 1500 -- -- -- 78 15 95 % -- --   04/11/24 1459 -- -- -- 73 (!) 5 93 % -- --   04/11/24 1458 -- -- -- 71 (!) 9 94 % -- --   04/11/24 1457 -- -- -- 68 10 93 % -- --   04/11/24 1456 129/69 98.5 °F (36.9 °C) Temporal 68 (!) 8 93 % -- --   04/11/24 1455 -- -- -- 75 12 95 % -- --   04/11/24 1454 -- -- -- 70 19 94 % -- --   04/11/24 1453 -- -- -- 79 14 (!) 88 % -- --   04/11/24 1452 -- -- -- -- -- 90 % -- --   04/11/24 1118 -- -- -- -- -- 98 % -- --   04/11/24 1116 (!) 140/76 98.6 °F (37 °C) Oral 70 18 -- -- --       General: alert, appears stated age, cooperative, and no distress   Wound: Wound clean and dry no evidence of infection.   Motion: Painful range of Motion in affected extremity   DVT Exam: No evidence of DVT seen on physical exam.  No cords or calf tenderness.  No significant calf/ankle edema.     Additional exam: Patient seen laying in bed at time of interview  Leg lengths equal, rotational alignment neutral  Thigh moderate swelling as expected, compartments compressible  EHL, FHL, gastroc, and anterior tibialis motor intact  NVI      Data Review  CBC:   Lab Results   Component Value Date/Time    WBC 19.6 04/12/2024 06:47 AM    RBC 5.04 04/12/2024 06:47 AM    HGB 16.0 04/12/2024 06:47 AM    HCT 48.3 04/12/2024 06:47 AM     04/12/2024 06:47 AM       Renal:   Lab Results   Component Value Date/Time     04/12/2024 06:47 AM    K 4.2 04/12/2024 06:47 AM    CL 98 04/12/2024 06:47 AM    CO2 24 04/12/2024 06:47 AM    BUN 7 04/12/2024 06:47 AM    CREATININE 0.7 04/12/2024 06:47 AM

## 2024-04-12 NOTE — CARE COORDINATION
Case Management Assessment  Initial Evaluation    Date/Time of Evaluation: 4/12/2024 9:14 AM  Assessment Completed by: Harini Helm RN    If patient is discharged prior to next notation, then this note serves as note for discharge by case management.    Patient Name: Noam Arita Jr.                   YOB: 1985  Diagnosis: Osteoarthritis resulting from right hip dysplasia [M16.31]  Legg-Perthes disease, right [M91.11]  Primary localized osteoarthritis of right hip [M16.11]                   Date / Time: 4/11/2024 10:45 AM    Patient Admission Status: Outpatient in a bed   Readmission Risk (Low < 19, Mod (19-27), High > 27): No data recorded  Current PCP: Karlee Diaz PA  PCP verified by CM? Yes (Karlee FREEMAN)    Chart Reviewed: Yes      History Provided by: Patient, Medical Record  Patient Orientation: Alert and Oriented    Patient Cognition: Alert    Hospitalization in the last 30 days (Readmission):  No    If yes, Readmission Assessment in CM Navigator will be completed.    Advance Directives:      Code Status: Full Code   Patient's Primary Decision Maker is: Legal Next of Kin    Primary Decision Maker: Ailyn Arita - Spouse - [phone]    Discharge Planning:    Patient lives with: Spouse/Significant Other, Children Type of Home: House  Primary Care Giver: Self  Patient Support Systems include: Spouse/Significant Other   Current Financial resources: Other (Comment) (BCBS)  Current community resources: None  Current services prior to admission: Durable Medical Equipment            Current DME: Walker, Crutches            Type of Home Care services:  None    ADLS  Prior functional level: Independent in ADLs/IADLs  Current functional level: Assistance with the following:, Mobility (POD #2 Hip replacement)    PT AM-PAC: 18 /24  OT AM-PAC: 15 /24    Family can provide assistance at DC: Yes  Would you like Case Management to discuss the discharge plan with any other

## 2024-04-15 ENCOUNTER — TELEPHONE (OUTPATIENT)
Dept: ORTHOPEDIC SURGERY | Age: 39
End: 2024-04-15

## 2024-04-15 ENCOUNTER — TELEPHONE (OUTPATIENT)
Dept: FAMILY MEDICINE CLINIC | Age: 39
End: 2024-04-15

## 2024-04-15 NOTE — TELEPHONE ENCOUNTER
Care Transitions Initial Follow Up Call    Outreach made within 2 business days of discharge: Yes    Patient: Noam Arita Jr. Patient : 1985   MRN: [id number]  Reason for Admission: There are no discharge diagnoses documented for the most recent discharge.  Discharge Date: 24       Spoke with: Noam Patient had a planned right hip surgery and will be following up with ortho.    Discharge department/facility: [facility] Interactive Patient Contact:  Was patient able to fill all prescriptions: Yes  Was patient instructed to bring all medications to the follow-up visit: Yes  Is patient taking all medications as directed in the discharge summary? Yes  Does patient understand their discharge instructions: Yes  Does patient have questions or concerns that need addressed prior to 7-14 day follow up office visit: no    Scheduled appointment with PCP within 7-14 days    Follow Up  Future Appointments   Date Time Provider Department Center   2024  8:20 AM Sabrina Travis PT [facility]   2024 11:00 AM Michael Macario MD [facility]   2024  9:00 AM Karlee Diaz PA [facility] [place] [facility]ES[facility] Shook LPN

## 2024-04-15 NOTE — TELEPHONE ENCOUNTER
Spoke with pt, doing well.     Incision status: No drainage or redness    Edema/Swelling/Teds: Minimal     Pain level and status: A bit more pain than he thought it would be.     Use of pain medications: Using as needed- was hesitant to use pain meds bc he thought he was going to run out.   Using muscle relaxant only at night, discussed using it throughout the day as well.     Blood thinner: ASA 81mg BID with no issues    Bowels: Going to start prune juice today, aware to use stool softener if that does not help.     Home Care Agency active: NA    Outpatient therapy: NA    Do you have all of your medications: Yes    Changes in medications: no    Instructed pt to call Nurse Navigator or surgeon's office with any questions or concerns.     Follow up appointments:    Future Appointments   Date Time Provider Department Center   4/29/2024  8:20 AM Sabrina Travis, PT [facility]   5/2/2024 11:00 AM Michael Macario MD [facility]   9/16/2024  9:00 AM Karlee Diaz PA [facility]  [facility]

## 2024-04-16 DIAGNOSIS — M16.31 OSTEOARTHRITIS RESULTING FROM RIGHT HIP DYSPLASIA: ICD-10-CM

## 2024-04-18 RX ORDER — OXYCODONE HYDROCHLORIDE 5 MG/1
5 TABLET ORAL EVERY 6 HOURS PRN
Qty: 28 TABLET | Refills: 0 | Status: SHIPPED | OUTPATIENT
Start: 2024-04-18 | End: 2024-04-25

## 2024-04-24 ENCOUNTER — TELEPHONE (OUTPATIENT)
Dept: ORTHOPEDIC SURGERY | Age: 39
End: 2024-04-24

## 2024-04-24 NOTE — TELEPHONE ENCOUNTER
Attempted to contact pt, unable to leave a voicemail with purpose and call back number.    Christy Martinez  Orthopedic Nurse Navigator  Phone number: [phone]    Follow up appointments:    Future Appointments   Date Time Provider Department Center   4/29/2024  8:20 AM [facility]   5/2/2024 11:00 AM Michael Macario MD [facility]   9/16/2024  9:00 AM Karlee Diaz PA [facility]  [facility]     Signed off from pt.  Instructed pt to call RN or Surgeon's office with any issues or concerns.

## 2024-04-29 ENCOUNTER — HOSPITAL ENCOUNTER (OUTPATIENT)
Dept: PHYSICAL THERAPY | Age: 39
Setting detail: THERAPIES SERIES
Discharge: HOME | End: 2024-04-29
Payer: COMMERCIAL

## 2024-04-29 DIAGNOSIS — Z96.641 STATUS POST TOTAL REPLACEMENT OF RIGHT HIP: Primary | ICD-10-CM

## 2024-04-29 PROCEDURE — 97164 PT RE-EVAL EST PLAN CARE: CPT

## 2024-04-29 PROCEDURE — 97140 MANUAL THERAPY 1/> REGIONS: CPT

## 2024-04-29 PROCEDURE — 97110 THERAPEUTIC EXERCISES: CPT

## 2024-04-29 NOTE — PLAN OF CARE
[facility]- Outpatient Rehabilitation and Therapy [address] [place] [address] [place] [address] office: [phone] fax: [phone]    Physical Therapy Re-Certification Plan of Care    Dear Michael Macario MD  ,    We had the pleasure of treating the following patient for physical therapy services at [facility] Outpatient Physical Therapy. A summary of our findings can be found in the updated assessment below.  This includes our plan of care.  If you have any questions or concerns regarding these findings, please do not hesitate to contact me at the office phone number checked above.  Thank you for the referral.     Physician Signature:________________________________Date:__________________  By signing above (or electronic signature), therapist's plan is approved by physician      Functional Outcome: WOMAC: 29%  Noam Arita Jr. 1985 continues to present with functional deficits in ROM, joint mobility, strength symmetry, flexibility, and muscle activation  limiting ability with walking on uneven ground, walking up/down stairs, navigate curbs/steps, transitions between positions, and heavy home activity .  During therapy this date, patient required verbal cueing, progression of exercises and program, and manual interventions for improving proper muscle recruitment and activation/motor control patterns, modulating pain, increasing ROM, reduce/eliminate soft tissue swelling/inflammation/restriction, kinesthetic sense and proprioception, and improving postural awareness. Patient will continue to benefit from ongoing evaluation and advanced clinical decision from a Physical Therapist to improve ROM, muscle strength, neuromuscular control, normalization of gait, and functional mobility to safely return to [facility] without symptoms or restrictions.    Overall Response to Treatment:  Re-evaluation done post op LIZETT    Total Visits: 2     Recommendation:    [x] Continue PT 2x / wk for 4-6 weeks.   []

## 2024-05-01 ENCOUNTER — APPOINTMENT (OUTPATIENT)
Dept: PHYSICAL THERAPY | Age: 39
End: 2024-05-01
Payer: COMMERCIAL

## 2024-05-02 ENCOUNTER — OFFICE VISIT (OUTPATIENT)
Dept: ORTHOPEDIC SURGERY | Age: 39
End: 2024-05-02

## 2024-05-02 VITALS — WEIGHT: 212 LBS | HEIGHT: 67 IN | BODY MASS INDEX: 33.27 KG/M2

## 2024-05-02 DIAGNOSIS — Z96.641 S/P TOTAL RIGHT HIP ARTHROPLASTY: Primary | ICD-10-CM

## 2024-05-02 PROCEDURE — 99024 POSTOP FOLLOW-UP VISIT: CPT | Performed by: ORTHOPAEDIC SURGERY

## 2024-05-02 RX ORDER — SULFAMETHOXAZOLE AND TRIMETHOPRIM 800; 160 MG/1; MG/1
1 TABLET ORAL 2 TIMES DAILY
Qty: 20 TABLET | Refills: 0 | Status: SHIPPED | OUTPATIENT
Start: 2024-05-02 | End: 2024-05-12

## 2024-05-02 RX ORDER — METHYLPREDNISOLONE 4 MG/1
TABLET ORAL
Qty: 1 KIT | Refills: 0 | Status: SHIPPED | OUTPATIENT
Start: 2024-05-02

## 2024-05-02 NOTE — PROGRESS NOTES
Dr Michael Macario      Date /Time 5/2/2024       11:20 AM EDT  Name Noam Arita Jr.             1985   Location  [facility] ORTHO  MRN [id number]                Chief Complaint   Patient presents with    Post-op Problem     1st PO Right LIZETT 04/11/2024        History of Present Illness      Noam Arita Jr. is a 38 y.o. male is here for post-op visit after RIGHT  25976 Total Hip Arthroplasty  Posterior      Patient presents the office today for postoperative visit for above-mentioned surgery.  Patient doing well.  Patient denies any fever, chills, or drainage.  Pain controlled.    Physical Exam    Based off 1997 Exam Criteria    Ht 1.702 m (5' 7.01\")   Wt 96.2 kg (212 lb)   BMI 33.19 kg/m²      Constitutional:       General: He is not in acute distress.     Appearance: Normal appearance.     RIGHT Hip: incision clean, intact, healing appropriately.  Mild surrounding erythema.  This more reflects allergic reaction then infection. Neuro intact distal. No evidence of DVT.        Imaging       Right Hip: [facility]  Radiographs: AP pelvis, lateral, and false profile were ordered today and reviewed.  They demonstrate a total hip arthroplasty in good position.  No evidence of loosening or periprosthetic fracture.      Assessment and Plan    Noam was seen today for post-op problem.    Diagnoses and all orders for this visit:    S/P total right hip arthroplasty  -     XR HIP RIGHT (2-3 VIEWS); Future        Patient doing well.  Patient can start physical therapy at this time.  We will see him back in 1 week to check his incision and his allergic reaction.  He will take oral Benadryl and apply topical hydrocortisone cream but not directly over the incision.  We will also place him on a Medrol Dosepak.  In addition prophylactically we will place him on Bactrim.  Call sooner if any problems arise    Electronically signed by Michael Macario MD on 5/2/2024 at 11:20 AM  This dictation

## 2024-05-09 ENCOUNTER — OFFICE VISIT (OUTPATIENT)
Dept: ORTHOPEDIC SURGERY | Age: 39
End: 2024-05-09

## 2024-05-09 DIAGNOSIS — Z96.641 S/P TOTAL RIGHT HIP ARTHROPLASTY: Primary | ICD-10-CM

## 2024-05-09 PROCEDURE — 99024 POSTOP FOLLOW-UP VISIT: CPT | Performed by: ORTHOPAEDIC SURGERY

## 2024-05-09 NOTE — PROGRESS NOTES
Dr Michael Macario      Date /Time 5/9/2024       11:20 AM EDT  Name Noam Arita Jr.             1985   [facility]  MRN [id number]                Chief Complaint   Patient presents with    Follow-up     Ck Right LIZETT 04/11/2024 (incision)         History of Present Illness      Noam Arita Jr. is a 38 y.o. male is here for post-op visit after RIGHT  35246 Total Hip Arthroplasty  Posterior      Patient presents the office today for postoperative visit for above-mentioned surgery.  Patient doing well.  Patient denies any fever, chills, or drainage.  Pain controlled.    Physical Exam    Based off 1997 Exam Criteria    There were no vitals taken for this visit.     Constitutional:       General: He is not in acute distress.     Appearance: Normal appearance.     RIGHT Hip: incision clean, intact, healing appropriately.  No erythema no fluctuance no discharge this more reflects allergic reaction then infection. Neuro intact distal. No evidence of DVT.        Imaging       Right Hip: [facility]  Radiographs: AP pelvis, lateral, and false profile were ordered today and reviewed.  They demonstrate a total hip arthroplasty in good position.  No evidence of loosening or periprosthetic fracture.      Assessment and Plan    Noam was seen today for follow-up.    Diagnoses and all orders for this visit:    S/P total right hip arthroplasty          Patient doing well.  Patient can start physical therapy at this time.  His incision looks much better.  Finish off antibiotics.  Follow-up in 3 months or sooner if problems arise    Electronically signed by Michael Macario MD on 5/9/2024 at 9:28 AM  This dictation was generated by voice recognition computer software.  Although all attempts are made to edit the dictation for accuracy, there may be errors in the transcription that are not intended.

## 2024-05-10 ENCOUNTER — HOSPITAL ENCOUNTER (OUTPATIENT)
Dept: PHYSICAL THERAPY | Age: 39
Setting detail: THERAPIES SERIES
Discharge: HOME | End: 2024-05-10
Payer: COMMERCIAL

## 2024-05-10 PROCEDURE — 97110 THERAPEUTIC EXERCISES: CPT

## 2024-05-10 PROCEDURE — 97140 MANUAL THERAPY 1/> REGIONS: CPT

## 2024-05-10 NOTE — PLAN OF CARE
[facility]- Outpatient Rehabilitation and Therapy  [facility] [place] [address] office: [phone] fax: [phone]      Physical Therapy: TREATMENT/PROGRESS NOTE   Patient: Noam Arita Jr. (38 y.o. male)   Examination Date: 05/10/2024   :  1985 MRN: [id number]   Visit #: 3   Insurance Allowable Auth Needed   50 erick year []Yes    [x]No    Insurance: Payor: [facility] / Plan: [facility] [place] PPO / Product Type: *No Product type* /   Insurance ID: [id number] - ([facility])  Secondary Insurance (if applicable):    Treatment Diagnosis: Hip and knee pain, mobility, strength and gait deficits.     Medical Diagnosis:  Unilateral primary osteoarthritis, right hip [M16.11]   Referring Physician: Michael Macario MD  PCP: Karlee Diaz PA       Plan of care signed (Y/N):     Date of Patient follow up with Physician: Surgery 24     Progress Report/POC: NO  POC update due: (10 visits /OR AUTH LIMITS, whichever is less)                                              Precautions/ Contra-indications:           Latex allergy:  NO  Pacemaker:    NO  Contraindications for Manipulation: None  Date of Surgery: 24  Other: H/o Degenerative disc disease , HTN, H/o left THR     Red Flags:  None    C-SSRS Triggered by Intake questionnaire:   [x] No, Questionnaire did not trigger screening.   [] Yes, Patient intake triggered further evaluation      [] C-SSRS Screening completed  [] PCP notified via Plan of Care  [] Emergency services notified     Preferred Language for Healthcare:   [x] English       [] other:    SUBJECTIVE EXAMINATION     Patient stated complaint: Patient reports no pain since last visit.  He stated he had a severe reaching to the mesh placed in the surgery.  He reports improving now.         Test used Initial score  3/22/24 05/10/2024   Pain Summary VAS 2/10 in hip  3/10 in knee 0/10 in hip  2/10 in knee   Functional questionnaire LEFS  30/80= 75%    Other: WOMAC

## 2024-05-15 ENCOUNTER — HOSPITAL ENCOUNTER (OUTPATIENT)
Dept: PHYSICAL THERAPY | Age: 39
Setting detail: THERAPIES SERIES
Discharge: HOME | End: 2024-05-15
Payer: COMMERCIAL

## 2024-05-15 PROCEDURE — 97140 MANUAL THERAPY 1/> REGIONS: CPT

## 2024-05-15 PROCEDURE — 97110 THERAPEUTIC EXERCISES: CPT

## 2024-05-15 NOTE — PLAN OF CARE
activity modification, progression of HEP.        [] Aquatic Therapy    Plan: Cont POC- Continue emphasis/focus on improving proper muscle recruitment and activation/motor control patterns, modulating pain, reduce/eliminate soft tissue swelling/inflammation/restriction, kinesthetic sense and proprioception, and improving postural awareness. Next visit plan to progress reps and continue current phase     Electronically Signed by OCTAVIO KAPLAN  PTA [id number]        Date: 05/15/2024     Note: Portions of this note have been templated and/or copied from initial evaluation, reassessments and prior notes for documentation efficiency.     Note: If patient does not return for scheduled/recommended follow up visits, this note will serve as a discharge from care along with the most recent update on progress.

## 2024-05-17 ENCOUNTER — HOSPITAL ENCOUNTER (OUTPATIENT)
Dept: PHYSICAL THERAPY | Age: 39
Setting detail: THERAPIES SERIES
Discharge: HOME | End: 2024-05-17
Payer: COMMERCIAL

## 2024-05-17 PROCEDURE — 97110 THERAPEUTIC EXERCISES: CPT

## 2024-05-17 PROCEDURE — 97140 MANUAL THERAPY 1/> REGIONS: CPT

## 2024-05-17 NOTE — FLOWSHEET NOTE
SURGERY: R LIZETT   Scheduled Sx Date: 4/8/24   Hospital D/C recommendations outpatient therapy   Additional Comments:      Functional Testing Prehab     Date: 05/17/2024 Post-op Re-Eval    Date:   6 weeks from surgery   Date : D/C      Date:    Current AD use None Crutches     TUG (sec) 6 sec 12.20 sec     5x  sit to stand w/out UE (reps)   6\" 10\"     Gait speed (m/s)  4x10m fast walk  Result = 40m/total time NT 7.91 \"     Supine Hip/Knee AROM L R L R L R L R   Hip Flexion 103 93  90       Knee Flexion           Knee Extension           WOMAC (raw) NT 28/96=29%         ROM/Strength: (Blank cells denote NT)     AROM L AROM R Notes              HIP Flexion 103 90     Abduction 50 45     ER  35     IR       Extension  10           KNEE Flexion       Extension                  (0-5) MMT L MMT R Notes       HIP Flexion 5 3/5 Very painful    Abduction 5 3/5 Mild pain    ER       IR       Extension  3-/5           KNEE Flexion 5 5     Extension 5 5        Palpation:   Mild irritation still present at the inferior tip of incision.  No obvious evidence of infection.      Exercises/Interventions     Therapeutic Ex (66626)  resistance Sets/time Reps Notes/Cues/Progressions   HEP     Issued and instructed           NuStep  L5 5'     Retro walking   2'     Bridge with hip abd Green  2 10    Clams supine green 1/10' 10 (B)   SLR  2/ 5\" 10    Calf stretching  30\" 2    Retro walking   2'     Monster walks  2'     Hip extension  1 10 Right only                 Manual Intervention (34880)  TIME     PROM to hip  Left hip flexor stretching  15'  x     STM roller to the quad, TP hip flexor right  x     MET for left post ilium with SB sacrum  x                   NMR re-education (85576) resistance Sets/time Reps CUES NEEDED                                      Therapeutic Activity (52921)  Sets/time                                               Modalities:    No modalities applied this session    Education/Home Exercise Program: Access

## 2024-05-22 ENCOUNTER — HOSPITAL ENCOUNTER (OUTPATIENT)
Dept: PHYSICAL THERAPY | Age: 39
Setting detail: THERAPIES SERIES
Discharge: HOME | End: 2024-05-22
Payer: COMMERCIAL

## 2024-05-22 PROCEDURE — 97140 MANUAL THERAPY 1/> REGIONS: CPT

## 2024-05-22 PROCEDURE — 97110 THERAPEUTIC EXERCISES: CPT

## 2024-05-22 NOTE — FLOWSHEET NOTE
mobilizations, STM, and Dry Needling/IASTM  [] Modalities as needed that may include: Cryotherapy and Electrical Stimulation  [] Patient education on joint protection, postural re-education, activity modification, progression of HEP.        [] Aquatic Therapy    Plan: Cont POC- Continue emphasis/focus on improving proper muscle recruitment and activation/motor control patterns, reduce/eliminate soft tissue swelling/inflammation/restriction, allowing for proper ROM, kinesthetic sense and proprioception, and improving postural awareness. Next visit plan to progress reps and add new exercises     Electronically Signed by OCTAVIO KAPLAN  PTA [id number]        Date: 05/22/2024     Note: Portions of this note have been templated and/or copied from initial evaluation, reassessments and prior notes for documentation efficiency.     Note: If patient does not return for scheduled/recommended follow up visits, this note will serve as a discharge from care along with the most recent update on progress.

## 2024-05-24 ENCOUNTER — APPOINTMENT (OUTPATIENT)
Dept: PHYSICAL THERAPY | Age: 39
End: 2024-05-24
Payer: COMMERCIAL

## 2024-05-29 ENCOUNTER — APPOINTMENT (OUTPATIENT)
Dept: PHYSICAL THERAPY | Age: 39
End: 2024-05-29
Payer: COMMERCIAL

## 2024-05-31 ENCOUNTER — APPOINTMENT (OUTPATIENT)
Dept: PHYSICAL THERAPY | Age: 39
End: 2024-05-31
Payer: COMMERCIAL

## 2024-05-31 DIAGNOSIS — J45.20 MILD INTERMITTENT ASTHMA WITHOUT COMPLICATION: ICD-10-CM

## 2024-05-31 RX ORDER — ALBUTEROL SULFATE 90 UG/1
2 AEROSOL, METERED RESPIRATORY (INHALATION) 4 TIMES DAILY PRN
Qty: 18 EACH | Refills: 5 | OUTPATIENT
Start: 2024-05-31

## 2024-05-31 NOTE — TELEPHONE ENCOUNTER
Refill Request     CONFIRM preferred pharmacy with the patient.    If Mail Order Rx - Pend for 90 day refill.      Last Seen: Last Seen Department: 3/15/2024  Last Seen by PCP: 3/15/2024    Last Written: 12/18/23 18 each 5 refills    If no future appointment scheduled:  Review the last OV with PCP and review information for follow-up visit,  Route STAFF MESSAGE with patient name to the  Pool for scheduling with the following information:            -  Timing of next visit           -  Visit type ie Physical, OV, etc           -  Diagnoses/Reason ie. COPD, HTN - Do not use MEDICATION, Follow-up or CHECK UP - Give reason for visit      Next Appointment:   Future Appointments   Date Time Provider Department Center   8/22/2024  9:15 AM Michael Macario MD [facility]   9/16/2024  9:00 AM Karlee Diaz PA [facility]  [place] [facility]       Message sent to  to schedule appt with patient?  NO      Requested Prescriptions     Pending Prescriptions Disp Refills    albuterol sulfate HFA (PROVENTIL;VENTOLIN;PROAIR) 108 (90 Base) MCG/ACT inhaler [Pharmacy Med Name: ALBUTEROL HFA (VENTOLIN) INH] 18 each 5     Sig: INHALE 2 PUFFS INTO THE LUNGS 4 TIMES DAILY AS NEEDED FOR WHEEZING.

## 2024-06-05 ENCOUNTER — PATIENT MESSAGE (OUTPATIENT)
Dept: ORTHOPEDIC SURGERY | Age: 39
End: 2024-06-05

## 2024-07-03 ENCOUNTER — PATIENT MESSAGE (OUTPATIENT)
Dept: FAMILY MEDICINE CLINIC | Age: 39
End: 2024-07-03

## 2024-07-03 RX ORDER — PREDNISONE 20 MG/1
40 TABLET ORAL DAILY
Qty: 10 TABLET | Refills: 0 | Status: SHIPPED | OUTPATIENT
Start: 2024-07-03 | End: 2024-07-08

## 2024-07-03 NOTE — TELEPHONE ENCOUNTER
From: Noam Arita Jr.  To: Karlee Diaz  Sent: 7/3/2024 10:21 AM EDT  Subject: Swelling     Hi Karlee. I was wondering if you could send me thru a prednisone blast like you’ve done before. I had a flare in my leg a couple weeks ago and the swelling just won’t go down. It’s not red anymore but the prednisone is what makes my swelling go away. Thanks!

## 2024-07-30 ENCOUNTER — OFFICE VISIT (OUTPATIENT)
Dept: FAMILY MEDICINE CLINIC | Age: 39
End: 2024-07-30
Payer: COMMERCIAL

## 2024-07-30 VITALS
WEIGHT: 218 LBS | OXYGEN SATURATION: 98 % | TEMPERATURE: 97.5 F | DIASTOLIC BLOOD PRESSURE: 64 MMHG | SYSTOLIC BLOOD PRESSURE: 122 MMHG | HEART RATE: 96 BPM | BODY MASS INDEX: 34.21 KG/M2 | HEIGHT: 67 IN

## 2024-07-30 DIAGNOSIS — L98.2 NEUTROPHILIC DERMATOSIS: Primary | ICD-10-CM

## 2024-07-30 PROCEDURE — 3078F DIAST BP <80 MM HG: CPT | Performed by: PHYSICIAN ASSISTANT

## 2024-07-30 PROCEDURE — 99213 OFFICE O/P EST LOW 20 MIN: CPT | Performed by: PHYSICIAN ASSISTANT

## 2024-07-30 PROCEDURE — 3074F SYST BP LT 130 MM HG: CPT | Performed by: PHYSICIAN ASSISTANT

## 2024-07-30 RX ORDER — PREDNISONE 10 MG/1
TABLET ORAL
Qty: 30 TABLET | Refills: 0 | Status: SHIPPED | OUTPATIENT
Start: 2024-07-30 | End: 2024-08-11

## 2024-07-30 RX ORDER — ALBUTEROL SULFATE 90 UG/1
2 AEROSOL, METERED RESPIRATORY (INHALATION) 4 TIMES DAILY PRN
COMMUNITY
Start: 2024-04-25 | End: 2024-07-30

## 2024-07-30 SDOH — ECONOMIC STABILITY: FOOD INSECURITY: WITHIN THE PAST 12 MONTHS, YOU WORRIED THAT YOUR FOOD WOULD RUN OUT BEFORE YOU GOT MONEY TO BUY MORE.: NEVER TRUE

## 2024-07-30 SDOH — ECONOMIC STABILITY: FOOD INSECURITY: WITHIN THE PAST 12 MONTHS, THE FOOD YOU BOUGHT JUST DIDN'T LAST AND YOU DIDN'T HAVE MONEY TO GET MORE.: NEVER TRUE

## 2024-07-30 SDOH — ECONOMIC STABILITY: INCOME INSECURITY: HOW HARD IS IT FOR YOU TO PAY FOR THE VERY BASICS LIKE FOOD, HOUSING, MEDICAL CARE, AND HEATING?: NOT HARD AT ALL

## 2024-07-30 ASSESSMENT — PATIENT HEALTH QUESTIONNAIRE - PHQ9: DEPRESSION UNABLE TO ASSESS: PT REFUSES

## 2024-07-30 ASSESSMENT — ENCOUNTER SYMPTOMS: COLOR CHANGE: 1

## 2024-07-30 NOTE — PROGRESS NOTES
Noam Arita Jr. (:  1985) is a 38 y.o. male,Established patient, here for evaluation of the following chief complaint(s):  Other (Rash is moving up back from left leg-hot )      Assessment & Plan   1. Neutrophilic dermatosis  -     predniSONE (DELTASONE) 10 MG tablet; Take 4 tablets by mouth daily for 3 days, THEN 3 tablets daily for 3 days, THEN 2 tablets daily for 3 days, THEN 1 tablet daily for 3 days., Disp-30 tablet, R-0Normal         -    recommended he follow up with the specialist to discuss possibly starting immunologic. Follow up in 2 days, if no improvement in rash we will add on clindamycin      Subjective   HPI  Neutrophilic Dermatosis  Timing: one month  Started on the left lower leg and then traveled to his back  The left lower leg is no longer red and the swelling is mostly improved  He has chills  He denies pain or itching    Review of Systems   Constitutional:  Positive for chills. Negative for fever.   Cardiovascular:  Positive for leg swelling.   Skin:  Positive for color change.   Hematological:  Negative for adenopathy.          Objective   Physical Exam  Vitals reviewed.   Constitutional:       Appearance: Normal appearance.   Skin:     Findings: Erythema present.   Neurological:      Mental Status: He is alert.                  An electronic signature was used to authenticate this note.    --LYDIA Ruiz

## 2024-08-16 ENCOUNTER — PATIENT MESSAGE (OUTPATIENT)
Dept: FAMILY MEDICINE CLINIC | Age: 39
End: 2024-08-16

## 2024-08-16 DIAGNOSIS — R60.0 LOWER EXTREMITY EDEMA: Primary | ICD-10-CM

## 2024-08-19 RX ORDER — PREDNISONE 10 MG/1
TABLET ORAL
Qty: 18 TABLET | Refills: 0 | Status: SHIPPED | OUTPATIENT
Start: 2024-08-19 | End: 2024-08-28

## 2024-08-22 ENCOUNTER — OFFICE VISIT (OUTPATIENT)
Dept: ORTHOPEDIC SURGERY | Age: 39
End: 2024-08-22
Payer: COMMERCIAL

## 2024-08-22 VITALS — BODY MASS INDEX: 34.21 KG/M2 | HEIGHT: 67 IN | WEIGHT: 218 LBS

## 2024-08-22 DIAGNOSIS — Z96.641 S/P TOTAL RIGHT HIP ARTHROPLASTY: Primary | ICD-10-CM

## 2024-08-22 PROCEDURE — 99213 OFFICE O/P EST LOW 20 MIN: CPT | Performed by: ORTHOPAEDIC SURGERY

## 2024-08-22 NOTE — PROGRESS NOTES
Dr Michael Macario      Date /Time 8/22/2024       11:20 AM EDT  Name Noam Arita Jr.             1985   [facility]  MRN [id number]                Chief Complaint   Patient presents with    Follow-up     Ck Right LIZETT 04/11/2024        History of Present Illness      Noam Arita Jr. is a 39 y.o. male is here for post-op visit after RIGHT  34363 Total Hip Arthroplasty  Posterior      Patient presents the office today for postoperative visit for above-mentioned surgery.  Patient doing well.  Patient denies any fever, chills, or drainage.  Pain controlled.    Physical Exam    Based off 1997 Exam Criteria    Ht 1.702 m (5' 7\")   Wt 98.9 kg (218 lb)   BMI 34.14 kg/m²      Constitutional:       General: He is not in acute distress.     Appearance: Normal appearance.     RIGHT Hip: incision clean, intact, healing appropriately.  No erythema no fluctuance no discharge this more reflects allergic reaction then infection. Neuro intact distal. No evidence of DVT.        Imaging       Right Hip: [facility]  Radiographs: AP pelvis, lateral, and false profile were ordered today and reviewed.  They demonstrate a total hip arthroplasty in good position.  No evidence of loosening or periprosthetic fracture.      Assessment and Plan    Noam was seen today for follow-up.    Diagnoses and all orders for this visit:    S/P total right hip arthroplasty  -     XR HIP RIGHT (2-3 VIEWS); Future          Patient doing well.  Patient continuing wellness physical therapy exercises on his own at home.  He will follow-up in the office 1 year from surgery or sooner if problems arise.  We have discussed predental and preinvasive procedure antibiotics.    I discussed with Noam Arita Jr. that his history, symptoms, signs, and imaging are most consistent with previous LIZETT replacement    We reviewed the natural history of these conditions and treatment options ranging from

## 2024-08-26 ENCOUNTER — HOSPITAL ENCOUNTER (OUTPATIENT)
Dept: VASCULAR LAB | Age: 39
Discharge: HOME | End: 2024-08-28
Payer: COMMERCIAL

## 2024-08-26 DIAGNOSIS — R60.0 LOWER EXTREMITY EDEMA: ICD-10-CM

## 2024-08-26 PROCEDURE — 93971 EXTREMITY STUDY: CPT

## 2024-08-28 ENCOUNTER — PATIENT MESSAGE (OUTPATIENT)
Dept: FAMILY MEDICINE CLINIC | Age: 39
End: 2024-08-28

## 2024-08-28 PROCEDURE — 93971 EXTREMITY STUDY: CPT | Performed by: SURGERY

## 2024-08-28 RX ORDER — DOXYCYCLINE HYCLATE 100 MG
100 TABLET ORAL 2 TIMES DAILY
Qty: 14 TABLET | Refills: 0 | Status: SHIPPED | OUTPATIENT
Start: 2024-08-28 | End: 2024-09-04

## 2024-09-04 ENCOUNTER — PATIENT MESSAGE (OUTPATIENT)
Dept: FAMILY MEDICINE CLINIC | Age: 39
End: 2024-09-04

## 2024-09-04 DIAGNOSIS — R60.0 LOWER EXTREMITY EDEMA: Primary | ICD-10-CM

## 2024-09-13 ENCOUNTER — HOSPITAL ENCOUNTER (OUTPATIENT)
Dept: CT IMAGING | Age: 39
Discharge: HOME | End: 2024-09-13
Payer: COMMERCIAL

## 2024-09-13 DIAGNOSIS — R60.0 LOWER EXTREMITY EDEMA: ICD-10-CM

## 2024-09-13 LAB
PERFORMED ON: NORMAL
POC CREATININE: 1.2 MG/DL (ref 0.9–1.3)
POC SAMPLE TYPE: NORMAL

## 2024-09-13 PROCEDURE — 72193 CT PELVIS W/DYE: CPT

## 2024-09-13 PROCEDURE — 82565 ASSAY OF CREATININE: CPT

## 2024-09-13 PROCEDURE — 6360000004 HC RX CONTRAST MEDICATION: Performed by: PHYSICIAN ASSISTANT

## 2024-09-13 RX ORDER — IOPAMIDOL 755 MG/ML
75 INJECTION, SOLUTION INTRAVASCULAR
Status: COMPLETED | OUTPATIENT
Start: 2024-09-13 | End: 2024-09-13

## 2024-09-13 RX ADMIN — IOPAMIDOL 75 ML: 755 INJECTION, SOLUTION INTRAVENOUS at 07:50

## 2024-09-30 ENCOUNTER — PATIENT MESSAGE (OUTPATIENT)
Dept: FAMILY MEDICINE CLINIC | Age: 39
End: 2024-09-30

## 2024-09-30 RX ORDER — PREDNISONE 10 MG/1
TABLET ORAL
Qty: 30 TABLET | Refills: 0 | Status: SHIPPED | OUTPATIENT
Start: 2024-09-30 | End: 2024-10-12

## 2024-11-13 DIAGNOSIS — I10 ESSENTIAL HYPERTENSION: ICD-10-CM

## 2024-11-13 RX ORDER — LISINOPRIL 10 MG/1
10 TABLET ORAL DAILY
Qty: 90 TABLET | Refills: 1 | OUTPATIENT
Start: 2024-11-13

## 2024-11-15 ENCOUNTER — PATIENT MESSAGE (OUTPATIENT)
Dept: FAMILY MEDICINE CLINIC | Age: 39
End: 2024-11-15

## 2024-11-15 ENCOUNTER — OFFICE VISIT (OUTPATIENT)
Dept: FAMILY MEDICINE CLINIC | Age: 39
End: 2024-11-15
Payer: COMMERCIAL

## 2024-11-15 VITALS
HEART RATE: 82 BPM | DIASTOLIC BLOOD PRESSURE: 80 MMHG | OXYGEN SATURATION: 96 % | WEIGHT: 228 LBS | SYSTOLIC BLOOD PRESSURE: 126 MMHG | BODY MASS INDEX: 35.71 KG/M2

## 2024-11-15 DIAGNOSIS — I10 ESSENTIAL HYPERTENSION: ICD-10-CM

## 2024-11-15 DIAGNOSIS — E78.00 HYPERCHOLESTEROLEMIA: Primary | ICD-10-CM

## 2024-11-15 DIAGNOSIS — J45.20 MILD INTERMITTENT ASTHMA WITHOUT COMPLICATION: ICD-10-CM

## 2024-11-15 DIAGNOSIS — L81.9 HYPERPIGMENTATION: ICD-10-CM

## 2024-11-15 DIAGNOSIS — R79.89 ELEVATED TESTOSTERONE LEVEL IN MALE: ICD-10-CM

## 2024-11-15 DIAGNOSIS — E78.00 HYPERCHOLESTEROLEMIA: ICD-10-CM

## 2024-11-15 LAB
ALBUMIN SERPL-MCNC: 3.8 G/DL (ref 3.4–5)
ALBUMIN/GLOB SERPL: 1.7 {RATIO} (ref 1.1–2.2)
ALP SERPL-CCNC: 66 U/L (ref 40–129)
ALT SERPL-CCNC: 23 U/L (ref 10–40)
ANION GAP SERPL CALCULATED.3IONS-SCNC: 9 MMOL/L (ref 3–16)
AST SERPL-CCNC: 17 U/L (ref 15–37)
BASOPHILS # BLD: 0 K/UL (ref 0–0.2)
BASOPHILS NFR BLD: 0.4 %
BILIRUB SERPL-MCNC: 0.4 MG/DL (ref 0–1)
BUN SERPL-MCNC: 7 MG/DL (ref 7–20)
CALCIUM SERPL-MCNC: 9.1 MG/DL (ref 8.3–10.6)
CHLORIDE SERPL-SCNC: 105 MMOL/L (ref 99–110)
CHOLEST SERPL-MCNC: 168 MG/DL (ref 0–199)
CO2 SERPL-SCNC: 25 MMOL/L (ref 21–32)
CORTIS AM PEAK SERPL-MCNC: 8.9 UG/DL (ref 4.3–22.4)
CREAT SERPL-MCNC: 0.9 MG/DL (ref 0.9–1.3)
DEPRECATED RDW RBC AUTO: 13.9 % (ref 12.4–15.4)
EOSINOPHIL # BLD: 0.3 K/UL (ref 0–0.6)
EOSINOPHIL NFR BLD: 2.4 %
GFR SERPLBLD CREATININE-BSD FMLA CKD-EPI: >90 ML/MIN/{1.73_M2}
GLUCOSE SERPL-MCNC: 86 MG/DL (ref 70–99)
HCT VFR BLD AUTO: 56.8 % (ref 40.5–52.5)
HDLC SERPL-MCNC: 41 MG/DL (ref 40–60)
HGB BLD-MCNC: 19 G/DL (ref 13.5–17.5)
LDLC SERPL CALC-MCNC: 117 MG/DL
LYMPHOCYTES # BLD: 2.5 K/UL (ref 1–5.1)
LYMPHOCYTES NFR BLD: 21.2 %
MCH RBC QN AUTO: 32.4 PG (ref 26–34)
MCHC RBC AUTO-ENTMCNC: 33.5 G/DL (ref 31–36)
MCV RBC AUTO: 96.8 FL (ref 80–100)
MONOCYTES # BLD: 0.8 K/UL (ref 0–1.3)
MONOCYTES NFR BLD: 6.5 %
NEUTROPHILS # BLD: 8.3 K/UL (ref 1.7–7.7)
NEUTROPHILS NFR BLD: 69.5 %
PLATELET # BLD AUTO: 230 K/UL (ref 135–450)
PMV BLD AUTO: 9.1 FL (ref 5–10.5)
POTASSIUM SERPL-SCNC: 4.6 MMOL/L (ref 3.5–5.1)
PROT SERPL-MCNC: 6 G/DL (ref 6.4–8.2)
RBC # BLD AUTO: 5.87 M/UL (ref 4.2–5.9)
SODIUM SERPL-SCNC: 139 MMOL/L (ref 136–145)
TRIGL SERPL-MCNC: 50 MG/DL (ref 0–150)
VLDLC SERPL CALC-MCNC: 10 MG/DL
WBC # BLD AUTO: 12 K/UL (ref 4–11)

## 2024-11-15 PROCEDURE — 3079F DIAST BP 80-89 MM HG: CPT | Performed by: PHYSICIAN ASSISTANT

## 2024-11-15 PROCEDURE — 99214 OFFICE O/P EST MOD 30 MIN: CPT | Performed by: PHYSICIAN ASSISTANT

## 2024-11-15 PROCEDURE — 3074F SYST BP LT 130 MM HG: CPT | Performed by: PHYSICIAN ASSISTANT

## 2024-11-15 RX ORDER — PREDNISONE 10 MG/1
TABLET ORAL
Qty: 30 TABLET | Refills: 0 | Status: SHIPPED | OUTPATIENT
Start: 2024-11-15 | End: 2024-11-27

## 2024-11-15 RX ORDER — LISINOPRIL 10 MG/1
10 TABLET ORAL DAILY
Qty: 90 TABLET | Refills: 1 | Status: SHIPPED | OUTPATIENT
Start: 2024-11-15

## 2024-11-15 ASSESSMENT — ENCOUNTER SYMPTOMS
COLOR CHANGE: 1
CHEST TIGHTNESS: 0
WHEEZING: 0
SHORTNESS OF BREATH: 0

## 2024-11-15 NOTE — PROGRESS NOTES
Noam Arita Jr. (:  1985) is a 39 y.o. male,Established patient, here for evaluation of the following chief complaint(s):  Hypertension (Follow up )         Assessment & Plan  Essential hypertension   Chronic, at goal (stable), continue with lisinopril, follow up in 6 months    Orders:    lisinopril (PRINIVIL;ZESTRIL) 10 MG tablet; Take 1 tablet by mouth daily    Comprehensive Metabolic Panel; Future    CBC with Auto Differential; Future    Hypercholesterolemia    Diet controlled, work on increasing fiber with a goal of 30 g per day, cut out processed and prepackaged foods    Orders:    LIPID PANEL; Future    Hyperpigmentation    Check cortisol, rule out [place]'s    Orders:    Cortisol AM, Total; Future    Mild intermittent asthma without complication   Chronic, at goal (stable), continue with albuterol prn           Return in about 6 months (around 5/15/2025) for HTN.       Subjective   HPI  HTN:  Current medication: lisinopril  Side effects: muscle cramps  Home blood pressure readings: normal range  Reports: none  Denies: dizziness, shortness of breath, headache  Pt has changed salt use     Asthma:  Current medication: albuterol  Side effects: none  Aggravated by changes in temperature and allergies  Not needing inhaler at night  Taking zyretc    Red face/tan body  Pt denies being out in the sun recently, last vacation was in early summer  States that his body always appears jacobson  He does drink alcohol      Review of Systems   Constitutional:  Negative for diaphoresis, fatigue and unexpected weight change.   Respiratory:  Negative for chest tightness, shortness of breath and wheezing.    Cardiovascular:  Negative for chest pain, palpitations and leg swelling.   Skin:  Positive for color change.          Objective   Physical Exam  Vitals reviewed.   Constitutional:       Appearance: Normal appearance.   HENT:      Head: Normocephalic and atraumatic.   Cardiovascular:      Rate and Rhythm:

## 2024-11-15 NOTE — ASSESSMENT & PLAN NOTE
Chronic, at goal (stable), continue with lisinopril, follow up in 6 months    Orders:    lisinopril (PRINIVIL;ZESTRIL) 10 MG tablet; Take 1 tablet by mouth daily    Comprehensive Metabolic Panel; Future    CBC with Auto Differential; Future

## 2024-11-18 ENCOUNTER — PATIENT MESSAGE (OUTPATIENT)
Dept: FAMILY MEDICINE CLINIC | Age: 39
End: 2024-11-18

## 2024-11-18 DIAGNOSIS — R79.89 LOW SERUM TOTAL PROTEIN LEVEL: Primary | ICD-10-CM

## 2024-11-18 DIAGNOSIS — R79.89 ABNORMAL CBC MEASUREMENT: Primary | ICD-10-CM

## 2024-11-19 LAB
SHBG SERPL-SCNC: 55 NMOL/L (ref 10–60)
TESTOST FREE SERPL-MCNC: ABNORMAL PG/ML (ref 47–244)
TESTOST SERPL-MCNC: >1500 NG/DL (ref 249–836)

## 2024-12-29 ENCOUNTER — PATIENT MESSAGE (OUTPATIENT)
Dept: FAMILY MEDICINE CLINIC | Age: 39
End: 2024-12-29

## 2024-12-30 RX ORDER — PREDNISONE 10 MG/1
TABLET ORAL
Qty: 30 TABLET | Refills: 0 | Status: SHIPPED | OUTPATIENT
Start: 2024-12-30 | End: 2025-01-11

## 2025-03-17 ENCOUNTER — PATIENT MESSAGE (OUTPATIENT)
Dept: FAMILY MEDICINE CLINIC | Age: 40
End: 2025-03-17

## 2025-03-17 RX ORDER — PREDNISONE 10 MG/1
TABLET ORAL
Qty: 30 TABLET | Refills: 0 | Status: SHIPPED | OUTPATIENT
Start: 2025-03-17 | End: 2025-03-29

## 2025-05-06 ENCOUNTER — PATIENT MESSAGE (OUTPATIENT)
Dept: FAMILY MEDICINE CLINIC | Age: 40
End: 2025-05-06

## 2025-05-07 ENCOUNTER — OFFICE VISIT (OUTPATIENT)
Dept: FAMILY MEDICINE CLINIC | Age: 40
End: 2025-05-07
Payer: COMMERCIAL

## 2025-05-07 ENCOUNTER — HOSPITAL ENCOUNTER (OUTPATIENT)
Dept: CT IMAGING | Age: 40
Discharge: HOME | End: 2025-05-07
Payer: COMMERCIAL

## 2025-05-07 VITALS
SYSTOLIC BLOOD PRESSURE: 118 MMHG | BODY MASS INDEX: 34.93 KG/M2 | DIASTOLIC BLOOD PRESSURE: 74 MMHG | WEIGHT: 223 LBS | OXYGEN SATURATION: 96 % | HEART RATE: 88 BPM

## 2025-05-07 DIAGNOSIS — R59.0 INGUINAL LYMPHADENOPATHY: Primary | ICD-10-CM

## 2025-05-07 DIAGNOSIS — R59.0 INGUINAL LYMPHADENOPATHY: ICD-10-CM

## 2025-05-07 DIAGNOSIS — L98.2 NEUTROPHILIC DERMATOSIS: ICD-10-CM

## 2025-05-07 LAB
PERFORMED ON: NORMAL
POC CREATININE: 0.9 MG/DL (ref 0.9–1.3)
POC SAMPLE TYPE: NORMAL

## 2025-05-07 PROCEDURE — 3078F DIAST BP <80 MM HG: CPT | Performed by: PHYSICIAN ASSISTANT

## 2025-05-07 PROCEDURE — 82565 ASSAY OF CREATININE: CPT

## 2025-05-07 PROCEDURE — 99214 OFFICE O/P EST MOD 30 MIN: CPT | Performed by: PHYSICIAN ASSISTANT

## 2025-05-07 PROCEDURE — 72193 CT PELVIS W/DYE: CPT

## 2025-05-07 PROCEDURE — 6360000004 HC RX CONTRAST MEDICATION: Performed by: PHYSICIAN ASSISTANT

## 2025-05-07 PROCEDURE — 3074F SYST BP LT 130 MM HG: CPT | Performed by: PHYSICIAN ASSISTANT

## 2025-05-07 RX ORDER — PREDNISONE 10 MG/1
TABLET ORAL
Qty: 30 TABLET | Refills: 0 | Status: SHIPPED | OUTPATIENT
Start: 2025-05-07 | End: 2025-05-19

## 2025-05-07 RX ORDER — DIATRIZOATE MEGLUMINE AND DIATRIZOATE SODIUM 660; 100 MG/ML; MG/ML
12 SOLUTION ORAL; RECTAL
Status: COMPLETED | OUTPATIENT
Start: 2025-05-07 | End: 2025-05-07

## 2025-05-07 RX ORDER — IOPAMIDOL 755 MG/ML
75 INJECTION, SOLUTION INTRAVASCULAR
Status: COMPLETED | OUTPATIENT
Start: 2025-05-07 | End: 2025-05-07

## 2025-05-07 RX ADMIN — IOPAMIDOL 75 ML: 755 INJECTION, SOLUTION INTRAVENOUS at 14:41

## 2025-05-07 RX ADMIN — DIATRIZOATE MEGLUMINE AND DIATRIZOATE SODIUM 12 ML: 660; 100 LIQUID ORAL; RECTAL at 14:42

## 2025-05-07 SDOH — ECONOMIC STABILITY: FOOD INSECURITY: WITHIN THE PAST 12 MONTHS, THE FOOD YOU BOUGHT JUST DIDN'T LAST AND YOU DIDN'T HAVE MONEY TO GET MORE.: NEVER TRUE

## 2025-05-07 SDOH — ECONOMIC STABILITY: INCOME INSECURITY: IN THE LAST 12 MONTHS, WAS THERE A TIME WHEN YOU WERE NOT ABLE TO PAY THE MORTGAGE OR RENT ON TIME?: NO

## 2025-05-07 SDOH — ECONOMIC STABILITY: FOOD INSECURITY: WITHIN THE PAST 12 MONTHS, YOU WORRIED THAT YOUR FOOD WOULD RUN OUT BEFORE YOU GOT MONEY TO BUY MORE.: NEVER TRUE

## 2025-05-07 ASSESSMENT — PATIENT HEALTH QUESTIONNAIRE - PHQ9
2. FEELING DOWN, DEPRESSED OR HOPELESS: NOT AT ALL
10. IF YOU CHECKED OFF ANY PROBLEMS, HOW DIFFICULT HAVE THESE PROBLEMS MADE IT FOR YOU TO DO YOUR WORK, TAKE CARE OF THINGS AT HOME, OR GET ALONG WITH OTHER PEOPLE: NOT DIFFICULT AT ALL
SUM OF ALL RESPONSES TO PHQ QUESTIONS 1-9: 0
9. THOUGHTS THAT YOU WOULD BE BETTER OFF DEAD, OR OF HURTING YOURSELF: NOT AT ALL
SUM OF ALL RESPONSES TO PHQ QUESTIONS 1-9: 0
1. LITTLE INTEREST OR PLEASURE IN DOING THINGS: NOT AT ALL
1. LITTLE INTEREST OR PLEASURE IN DOING THINGS: NOT AT ALL
4. FEELING TIRED OR HAVING LITTLE ENERGY: NOT AT ALL
3. TROUBLE FALLING OR STAYING ASLEEP: NOT AT ALL
7. TROUBLE CONCENTRATING ON THINGS, SUCH AS READING THE NEWSPAPER OR WATCHING TELEVISION: NOT AT ALL
6. FEELING BAD ABOUT YOURSELF - OR THAT YOU ARE A FAILURE OR HAVE LET YOURSELF OR YOUR FAMILY DOWN: NOT AT ALL
DEPRESSION UNABLE TO ASSESS: FUNCTIONAL CAPACITY MOTIVATION LIMITS ACCURACY
8. MOVING OR SPEAKING SO SLOWLY THAT OTHER PEOPLE COULD HAVE NOTICED. OR THE OPPOSITE, BEING SO FIGETY OR RESTLESS THAT YOU HAVE BEEN MOVING AROUND A LOT MORE THAN USUAL: NOT AT ALL
2. FEELING DOWN, DEPRESSED OR HOPELESS: NOT AT ALL
SUM OF ALL RESPONSES TO PHQ9 QUESTIONS 1 & 2: 0
5. POOR APPETITE OR OVEREATING: NOT AT ALL

## 2025-05-07 ASSESSMENT — ENCOUNTER SYMPTOMS: COLOR CHANGE: 1

## 2025-05-07 NOTE — PROGRESS NOTES
Naom Arita Jr. (:  1985) is a 39 y.o. male,Established patient, here for evaluation of the following chief complaint(s):  Cyst (Groin area, wants some imaging on it, states its swollen now )         Assessment & Plan  Inguinal lymphadenopathy    Will get Ct to see if there is another cause of his lymphadenopathy other than the dermatosis    Orders:    CT PELVIS W CONTRAST; Future    Neutrophilic dermatosis    Prednisone sent to pharmacy. He has follow up with specialist on  to discuss starting an immunologic medication           No follow-ups on file.       Subjective   HPI  Episode of neutrophilic dermatosis  Having more flare ups but they are less severe  Current flare up started on  afternoon  Seems to be affecting the left leg more than the right now  Current symptoms: left inguinal lymphadenopathy, redness and swelling  He reports that the lymph node used to go down but no longer    Review of Systems   Constitutional:  Negative for fever.   Genitourinary:  Positive for difficulty urinating. Negative for flank pain, frequency, hematuria, penile pain, penile swelling, scrotal swelling, testicular pain and urgency.   Skin:  Positive for color change.          Objective   Physical Exam  Musculoskeletal:      Left lower leg: Edema present.   Lymphadenopathy:      Lower Body: Left inguinal adenopathy present.   Skin:     Findings: Erythema present.                  An electronic signature was used to authenticate this note.    --LYDIA Ruiz

## 2025-05-07 NOTE — ASSESSMENT & PLAN NOTE
Prednisone sent to pharmacy. He has follow up with specialist on July 1st to discuss starting an immunologic medication

## 2025-05-08 ENCOUNTER — RESULTS FOLLOW-UP (OUTPATIENT)
Dept: FAMILY MEDICINE CLINIC | Age: 40
End: 2025-05-08

## 2025-05-08 DIAGNOSIS — I89.0 LYMPHEDEMA: Primary | ICD-10-CM

## 2025-05-11 DIAGNOSIS — I10 ESSENTIAL HYPERTENSION: ICD-10-CM

## 2025-05-12 RX ORDER — LISINOPRIL 10 MG/1
10 TABLET ORAL DAILY
Qty: 90 TABLET | Refills: 1 | Status: SHIPPED | OUTPATIENT
Start: 2025-05-12

## 2025-05-12 NOTE — TELEPHONE ENCOUNTER
Refill Request     CONFIRM preferred pharmacy with the patient.    If Mail Order Rx - Pend for 90 day refill.      Last Seen: Last Seen Department: 5/7/2025  Last Seen by PCP: 5/7/2025    Last Written: 11/15/24 90 with 1 refill     If no future appointment scheduled:  Review the last OV with PCP and review information for follow-up visit,  Route STAFF MESSAGE with patient name to the  Pool for scheduling with the following information:            -  Timing of next visit           -  Visit type ie Physical, OV, etc           -  Diagnoses/Reason ie. COPD, HTN - Do not use MEDICATION, Follow-up or CHECK UP - Give reason for visit      Next Appointment:   Future Appointments   Date Time Provider Department Center   5/16/2025  8:00 AM Karlee Diaz PA [facility]       Message sent to  to schedule appt with patient?  NO      Requested Prescriptions     Pending Prescriptions Disp Refills    lisinopril (PRINIVIL;ZESTRIL) 10 MG tablet [Pharmacy Med Name: LISINOPRIL 10 MG TABLET] 90 tablet 1     Sig: TAKE 1 TABLET BY MOUTH EVERY DAY

## 2025-06-02 ENCOUNTER — OFFICE VISIT (OUTPATIENT)
Dept: FAMILY MEDICINE CLINIC | Age: 40
End: 2025-06-02
Payer: COMMERCIAL

## 2025-06-02 VITALS
SYSTOLIC BLOOD PRESSURE: 128 MMHG | DIASTOLIC BLOOD PRESSURE: 76 MMHG | WEIGHT: 223 LBS | OXYGEN SATURATION: 98 % | BODY MASS INDEX: 35 KG/M2 | HEART RATE: 80 BPM | HEIGHT: 67 IN

## 2025-06-02 DIAGNOSIS — J20.9 ACUTE BRONCHITIS, UNSPECIFIED ORGANISM: Primary | ICD-10-CM

## 2025-06-02 PROCEDURE — 3074F SYST BP LT 130 MM HG: CPT | Performed by: PHYSICIAN ASSISTANT

## 2025-06-02 PROCEDURE — 99213 OFFICE O/P EST LOW 20 MIN: CPT | Performed by: PHYSICIAN ASSISTANT

## 2025-06-02 PROCEDURE — 3078F DIAST BP <80 MM HG: CPT | Performed by: PHYSICIAN ASSISTANT

## 2025-06-02 RX ORDER — ALBUTEROL SULFATE 90 UG/1
2 INHALANT RESPIRATORY (INHALATION) 4 TIMES DAILY PRN
Qty: 18 G | Refills: 5 | Status: SHIPPED | OUTPATIENT
Start: 2025-06-02

## 2025-06-02 ASSESSMENT — ENCOUNTER SYMPTOMS
TROUBLE SWALLOWING: 0
WHEEZING: 1
BACK PAIN: 1
RHINORRHEA: 0
CHEST TIGHTNESS: 1
DIARRHEA: 1
SINUS PRESSURE: 0
COUGH: 1
SINUS PAIN: 0
SORE THROAT: 0

## 2025-06-02 NOTE — PROGRESS NOTES
Noam Arita Jr. (:  1985) is a 39 y.o. male,Established patient, here for evaluation of the following chief complaint(s):  Cough (No fever, just constant cough and congestion ), Nasal Congestion, and Pharyngitis         Assessment & Plan  Acute bronchitis, unspecified organism   Chronic, not at goal (unstable), start on albuterol inhaler, follow up if symptoms do not improve     Orders:    albuterol sulfate HFA (VENTOLIN HFA) 108 (90 Base) MCG/ACT inhaler; Inhale 2 puffs into the lungs 4 times daily as needed for Wheezing      Return if symptoms worsen or fail to improve.       Subjective   HPI  URI  Timing: three days  Sick contacts: none  Tx: mucinex and ibuprofen  Worse in the AM    Review of Systems   Constitutional:  Positive for diaphoresis. Negative for chills and fever.   HENT:  Positive for congestion and postnasal drip. Negative for ear pain, rhinorrhea, sinus pressure, sinus pain, sore throat and trouble swallowing.    Respiratory:  Positive for cough, chest tightness and wheezing.    Gastrointestinal:  Positive for diarrhea.   Musculoskeletal:  Positive for back pain.          Objective   Physical Exam  Vitals reviewed.   Constitutional:       Appearance: Normal appearance.   HENT:      Head: Normocephalic and atraumatic.      Right Ear: Tympanic membrane, ear canal and external ear normal.      Left Ear: Tympanic membrane, ear canal and external ear normal.      Nose: Nose normal.      Mouth/Throat:      Mouth: Mucous membranes are moist.   Eyes:      Extraocular Movements: Extraocular movements intact.      Conjunctiva/sclera: Conjunctivae normal.      Pupils: Pupils are equal, round, and reactive to light.   Cardiovascular:      Rate and Rhythm: Normal rate and regular rhythm.      Heart sounds: Normal heart sounds.   Pulmonary:      Effort: Pulmonary effort is normal. No respiratory distress.      Breath sounds: Wheezing present. No rhonchi or rales.   Lymphadenopathy:

## 2025-06-09 ENCOUNTER — PATIENT MESSAGE (OUTPATIENT)
Dept: FAMILY MEDICINE CLINIC | Age: 40
End: 2025-06-09

## 2025-06-09 RX ORDER — DOXYCYCLINE HYCLATE 100 MG
100 TABLET ORAL 2 TIMES DAILY
Qty: 20 TABLET | Refills: 0 | Status: SHIPPED | OUTPATIENT
Start: 2025-06-09 | End: 2025-06-19

## 2025-06-09 RX ORDER — BENZONATATE 100 MG/1
100 CAPSULE ORAL 3 TIMES DAILY PRN
Qty: 30 CAPSULE | Refills: 0 | Status: SHIPPED | OUTPATIENT
Start: 2025-06-09

## 2025-06-13 ENCOUNTER — OFFICE VISIT (OUTPATIENT)
Dept: FAMILY MEDICINE CLINIC | Age: 40
End: 2025-06-13
Payer: COMMERCIAL

## 2025-06-13 ENCOUNTER — RESULTS FOLLOW-UP (OUTPATIENT)
Dept: FAMILY MEDICINE CLINIC | Age: 40
End: 2025-06-13

## 2025-06-13 ENCOUNTER — HOSPITAL ENCOUNTER (OUTPATIENT)
Dept: GENERAL RADIOLOGY | Age: 40
Discharge: HOME | End: 2025-06-13
Payer: COMMERCIAL

## 2025-06-13 VITALS
SYSTOLIC BLOOD PRESSURE: 110 MMHG | WEIGHT: 220 LBS | HEART RATE: 92 BPM | DIASTOLIC BLOOD PRESSURE: 78 MMHG | OXYGEN SATURATION: 94 % | BODY MASS INDEX: 34.45 KG/M2

## 2025-06-13 DIAGNOSIS — R05.1 ACUTE COUGH: Primary | ICD-10-CM

## 2025-06-13 DIAGNOSIS — R05.1 ACUTE COUGH: ICD-10-CM

## 2025-06-13 DIAGNOSIS — J45.21 MILD INTERMITTENT ASTHMA WITH EXACERBATION: ICD-10-CM

## 2025-06-13 PROCEDURE — 3078F DIAST BP <80 MM HG: CPT | Performed by: PHYSICIAN ASSISTANT

## 2025-06-13 PROCEDURE — 3074F SYST BP LT 130 MM HG: CPT | Performed by: PHYSICIAN ASSISTANT

## 2025-06-13 PROCEDURE — 71046 X-RAY EXAM CHEST 2 VIEWS: CPT

## 2025-06-13 PROCEDURE — 99214 OFFICE O/P EST MOD 30 MIN: CPT | Performed by: PHYSICIAN ASSISTANT

## 2025-06-13 RX ORDER — FLUTICASONE PROPIONATE 50 MCG
1 SPRAY, SUSPENSION (ML) NASAL DAILY
Qty: 16 G | Refills: 0 | Status: SHIPPED | OUTPATIENT
Start: 2025-06-13

## 2025-06-13 RX ORDER — PREDNISONE 10 MG/1
TABLET ORAL
Qty: 30 TABLET | Refills: 0 | Status: SHIPPED | OUTPATIENT
Start: 2025-06-13 | End: 2025-06-25

## 2025-06-13 ASSESSMENT — ENCOUNTER SYMPTOMS
RHINORRHEA: 0
SORE THROAT: 0
COUGH: 1
WHEEZING: 1
SINUS PRESSURE: 0
SHORTNESS OF BREATH: 1
CHEST TIGHTNESS: 1

## 2025-06-13 NOTE — PROGRESS NOTES
Noam Arita Jr. (:  1985) is a 39 y.o. male,, here for evaluation of the following chief complaint(s):  Cough (Was seen on 25 for the same issue, coughing up a lot of mucous. Would like a chest x ray ) and Shortness of Breath         Assessment & Plan  Acute cough   Rule out pneumonia, continue with doxycyline, will get chest xray for evaluation    Orders:    XR CHEST STANDARD (2 VW); Future    fluticasone (FLONASE) 50 MCG/ACT nasal spray; 1 spray by Each Nostril route daily    Mild intermittent asthma with exacerbation   Acute condition, new, start prednisone. Follow up with me next week to let me know how you are feeling    Orders:    predniSONE (DELTASONE) 10 MG tablet; Take 4 tablets by mouth daily for 3 days, THEN 3 tablets daily for 3 days, THEN 2 tablets daily for 3 days, THEN 1 tablet daily for 3 days.      Return if symptoms worsen or fail to improve.       Subjective   HPI  History of Present Illness  The patient presents for evaluation of a persistent cough.    Persistent Cough and Associated Symptoms  He reports a persistent cough, which is productive of mucus with a slight yellowish tinge. He experienced an episode of chest heaviness and difficulty breathing at night, leading to concerns about potential pneumonia. He does not have any fevers or chills but notes occasional sweating. His cough intensifies when he is in a supine position, and he experiences wheezing. He also reports shortness of breath, particularly when he is at rest, and a sensation of postnasal drainage, although less severe than before. He has been absent from work for the past 4 days due to his symptoms. He has been using his albuterol inhaler every 2 to 3 hours, taking 2 puffs each time, which he finds beneficial. He is currently on doxycycline and Tessalon Perles for his cough. He has been taking Mucinex.  - Onset: two weeks  - Location: Chest and respiratory tract.  - Character: Productive cough with

## 2025-06-20 DIAGNOSIS — J20.9 ACUTE BRONCHITIS, UNSPECIFIED ORGANISM: ICD-10-CM

## 2025-06-23 RX ORDER — ALBUTEROL SULFATE 90 UG/1
2 INHALANT RESPIRATORY (INHALATION) 4 TIMES DAILY PRN
Qty: 18 G | Refills: 5 | Status: SHIPPED | OUTPATIENT
Start: 2025-06-23

## 2025-06-23 NOTE — TELEPHONE ENCOUNTER
Refill Request     CONFIRM preferred pharmacy with the patient.    If Mail Order Rx - Pend for 90 day refill.      Last Seen: Last Seen Department: 6/13/2025  Last Seen by PCP: 6/13/2025    Last Written: 6/2/25 18 g with 5 refills     If no future appointment scheduled:  Review the last OV with PCP and review information for follow-up visit,  Route STAFF MESSAGE with patient name to the  Pool for scheduling with the following information:            -  Timing of next visit           -  Visit type ie Physical, OV, etc           -  Diagnoses/Reason ie. COPD, HTN - Do not use MEDICATION, Follow-up or CHECK UP - Give reason for visit      Next Appointment:   No future appointments.    Message sent to  to schedule appt with patient?  YES 11/2025 HTN      Requested Prescriptions     Pending Prescriptions Disp Refills    albuterol sulfate HFA (VENTOLIN HFA) 108 (90 Base) MCG/ACT inhaler 18 g 5     Sig: Inhale 2 puffs into the lungs 4 times daily as needed for Wheezing

## 2025-08-04 DIAGNOSIS — R05.1 ACUTE COUGH: ICD-10-CM

## 2025-08-04 RX ORDER — FLUTICASONE PROPIONATE 50 MCG
SPRAY, SUSPENSION (ML) NASAL
Qty: 24 ML | Refills: 1 | Status: SHIPPED | OUTPATIENT
Start: 2025-08-04

## (undated) DEVICE — SET IV PMP 1 PRT CK VLV SPL SEPT PRTS 2 PC M LL 20 GTT LEN

## (undated) DEVICE — PACK PROCEDURE SURG TOT HIP

## (undated) DEVICE — SUTURE ABSORBABLE MONOFILAMENT 1 CTX 36 CM 48 MM VIO PDS +

## (undated) DEVICE — PENCIL SMK EVAC ALL IN 1 DSGN ENH VISIBILITY IMPROVED AIR

## (undated) DEVICE — DRAPE 33X23IN INCISE ANTIMICROB IOBAN 2

## (undated) DEVICE — GLOVE SURG SZ 8 L12IN FNGR THK79MIL GRN LTX FREE

## (undated) DEVICE — SUTURE STRATAFIX SPRL SZ 2 0 L14IN ABSRB UD MH L36MM 1 2 CIR SXMD2B401

## (undated) DEVICE — DRAPE C ARM W46XL120IN XLN

## (undated) DEVICE — HEWSON SUTURE RETRIEVER: Brand: HEWSON SUTURE RETRIEVER

## (undated) DEVICE — SUTURE MONOCRYL STRATAFIX SPRL SZ 3-0 L12IN ABSRB UD FS-1 L30X30CM SXMP2B410

## (undated) DEVICE — HOOD: Brand: FLYTE

## (undated) DEVICE — COVER LT HNDL PLAS RIG 2 PER PK

## (undated) DEVICE — SOLUTION,SALINE,IRRGATION,500ML,STRL: Brand: MEDLINE

## (undated) DEVICE — SUTURE ETHIBOND EXCEL SZ 2 L30IN NONABSORBABLE GRN L40MM V-37 MX69G

## (undated) DEVICE — Device

## (undated) DEVICE — SUTURE STRATAFIX SPRL SZ 1 L14IN ABSRB VLT L48CM CTX 1/2 SXPD2B405

## (undated) DEVICE — INTENDED FOR TISSUE SEPARATION, AND OTHER PROCEDURES THAT REQUIRE A SHARP SURGICAL BLADE TO PUNCTURE OR CUT.: Brand: BARD-PARKER ® STAINLESS STEEL BLADES

## (undated) DEVICE — GOWN,SIRUS,POLYRNF,BRTHSLV,XL,30/CS: Brand: MEDLINE

## (undated) DEVICE — ADHESIVE SKIN CLOSURE WND 8.661X1.5 IN 22 CM LIQUIBAND SECUR

## (undated) DEVICE — ETHIBOND EXCEL SUT 30 INCHES75CM 2 GRN

## (undated) DEVICE — BLOOD TRANSFUSION FILTER: Brand: HAEMONETICS

## (undated) DEVICE — DRILL BIT 2.0MM (5/64'') X 128.0MM

## (undated) DEVICE — GLOVE ORTHO 7 1/2   MSG9475

## (undated) DEVICE — SYSTEM SKIN CLSR 22CM DERMBND PRINEO

## (undated) DEVICE — BIPOLAR SEALER 23-112-1 AQM 6.0: Brand: AQUAMANTYS ®

## (undated) DEVICE — SUTURE VICRYL + SZ 2-0 L18IN ABSRB UD CT1 L36MM 1/2 CIR VCP839D

## (undated) DEVICE — HOOD, PEEL-AWAY: Brand: FLYTE

## (undated) DEVICE — SYRINGE 30ML MEDICAL LEUR LOCK TIP WO

## (undated) DEVICE — DRAPE SURG UTIL 26X15 IN W/ TAPE N INVASIVE MULT LAYR DISP

## (undated) DEVICE — SUTURE ETHBND EXCEL SZ 2 L30IN NONABSORBABLE GRN L40MM V-37 MX69G

## (undated) DEVICE — SUTURE PERMAHAND SZ 2-0 L30IN NONABSORBABLE BLK SILK W/O A305H

## (undated) DEVICE — SYRINGE MED 30ML STD CLR PLAS LUERLOCK TIP N CTRL DISP

## (undated) DEVICE — SUTURE N ABSRB BRAIDED 5-0 CTX 39 IN 48 MM WHT BLK XBRAID S 3910900052

## (undated) DEVICE — 3M™ IOBAN™ 2 ANTIMICROBIAL INCISE DRAPE 6640EZ: Brand: IOBAN™ 2

## (undated) DEVICE — BLADE SAW W11XL77.5MM THK1.23MM CUT THK1.17MM S STL RECIP

## (undated) DEVICE — 1010 S-DRAPE TOWEL DRAPE 10/BX: Brand: STERI-DRAPE™

## (undated) DEVICE — GLOVE ORANGE PI 7 1/2   MSG9075

## (undated) DEVICE — OPTIFOAM GENTLE SA, POSTOP, 4X12: Brand: MEDLINE

## (undated) DEVICE — HANDPIECE SET WITH HIGH FLOW TIP AND SUCTION TUBE: Brand: INTERPULSE

## (undated) DEVICE — PEEL-AWAY HOOD: Brand: FLYTE, SURGICOOL

## (undated) DEVICE — NEEDLE SPNL 20GA L3.5IN YEL HUB S STL REG WALL FIT STYL W/

## (undated) DEVICE — GOWN SIRUS NONREIN XL W/TWL: Brand: MEDLINE INDUSTRIES, INC.

## (undated) DEVICE — BIT DRL L30MM DIA3.2MM DISP FOR G7 2 MOBILITY CONSTRUCT

## (undated) DEVICE — OPTIFOAM GENTLE SA, POSTOP, 4X10: Brand: MEDLINE

## (undated) DEVICE — SUTURE VCRL SZ 2-0 L18IN ABSRB UD CT-1 L36MM 1/2 CIR J839D

## (undated) DEVICE — NEEDLE SPNL 20GA L3.5IN YEL HUB S STL REG WALL FIT STYL

## (undated) DEVICE — SOLUTION IRRIG 2000ML 0.9% SOD CHL USP UROMATIC PLAS CONT

## (undated) DEVICE — 3M™ STERI-DRAPE™ INCISE DRAPE 1050 (60CM X 45CM): Brand: STERI-DRAPE™